# Patient Record
Sex: MALE | Race: BLACK OR AFRICAN AMERICAN | Employment: PART TIME | URBAN - METROPOLITAN AREA
[De-identification: names, ages, dates, MRNs, and addresses within clinical notes are randomized per-mention and may not be internally consistent; named-entity substitution may affect disease eponyms.]

---

## 2019-10-28 ENCOUNTER — ED HISTORICAL/CONVERTED ENCOUNTER (OUTPATIENT)
Dept: OTHER | Age: 34
End: 2019-10-28

## 2020-09-20 ENCOUNTER — HOSPITAL ENCOUNTER (EMERGENCY)
Age: 35
Discharge: HOME | End: 2020-09-20
Attending: EMERGENCY MEDICINE
Payer: COMMERCIAL

## 2020-09-20 ENCOUNTER — APPOINTMENT (OUTPATIENT)
Dept: CT IMAGING | Age: 35
End: 2020-09-20
Attending: EMERGENCY MEDICINE
Payer: COMMERCIAL

## 2020-09-20 VITALS
RESPIRATION RATE: 18 BRPM | DIASTOLIC BLOOD PRESSURE: 109 MMHG | TEMPERATURE: 97.8 F | SYSTOLIC BLOOD PRESSURE: 153 MMHG | HEIGHT: 71 IN | OXYGEN SATURATION: 95 % | HEART RATE: 73 BPM | BODY MASS INDEX: 33.6 KG/M2 | WEIGHT: 240 LBS

## 2020-09-20 DIAGNOSIS — N20.0 KIDNEY STONE: Primary | ICD-10-CM

## 2020-09-20 LAB
AMYLASE SERPL-CCNC: 75 U/L (ref 25–115)
ANION GAP SERPL CALC-SCNC: 7 MMOL/L (ref 5–15)
APPEARANCE UR: CLEAR
BACTERIA URNS QL MICRO: NEGATIVE /HPF
BASOPHILS # BLD: 0 K/UL (ref 0–0.2)
BASOPHILS NFR BLD: 0 % (ref 0–2.5)
BILIRUB UR QL: NEGATIVE
BUN SERPL-MCNC: 14 MG/DL (ref 6–20)
BUN/CREAT SERPL: 11 (ref 12–20)
CA-I BLD-MCNC: 9.5 MG/DL (ref 8.5–10.1)
CHLORIDE SERPL-SCNC: 104 MMOL/L (ref 97–108)
CO2 SERPL-SCNC: 29 MMOL/L (ref 21–32)
COLOR UR: ABNORMAL
CREAT SERPL-MCNC: 1.33 MG/DL (ref 0.7–1.3)
EOSINOPHIL # BLD: 0 K/UL (ref 0–0.7)
EOSINOPHIL NFR BLD: 0 % (ref 0.9–2.9)
ERYTHROCYTE [DISTWIDTH] IN BLOOD BY AUTOMATED COUNT: 14.6 % (ref 11.5–14.5)
GLUCOSE SERPL-MCNC: 116 MG/DL (ref 65–100)
GLUCOSE UR STRIP.AUTO-MCNC: NEGATIVE MG/DL
HCT VFR BLD AUTO: 41 % (ref 41–53)
HGB BLD-MCNC: 14.1 % (ref 13.5–17.5)
HGB UR QL STRIP: ABNORMAL
KETONES UR QL STRIP.AUTO: NEGATIVE MG/DL
LEUKOCYTE ESTERASE UR QL STRIP.AUTO: NEGATIVE
LYMPHOCYTES # BLD: 3.4 K/UL (ref 1–4.8)
LYMPHOCYTES NFR BLD: 38 % (ref 20.5–51.1)
MCH RBC QN AUTO: 31.3 PG (ref 31–34)
MCHC RBC AUTO-ENTMCNC: 34.3 G/DL (ref 31–36)
MCV RBC AUTO: 91.2 FL (ref 80–100)
MONOCYTES # BLD: 0.6 K/UL (ref 0.2–2.4)
MONOCYTES NFR BLD: 6 % (ref 1.7–9.3)
NEUTS SEG # BLD: 4.9 K/UL (ref 1.8–7.7)
NEUTS SEG NFR BLD: 56 % (ref 42–75)
NITRITE UR QL STRIP.AUTO: NEGATIVE
NRBC # BLD: 0.01 K/UL
NRBC BLD-RTO: 10 PER 100 WBC
PH UR STRIP: 6.5 [PH] (ref 5–8)
PLATELET # BLD AUTO: 439 K/UL
PMV BLD AUTO: 7.2 FL (ref 6.5–11.5)
POTASSIUM SERPL-SCNC: 4.1 MMOL/L (ref 3.5–5.1)
PROT UR STRIP-MCNC: 100 MG/DL
RBC # BLD AUTO: 4.5 M/UL (ref 4.5–5.9)
RBC #/AREA URNS HPF: NORMAL /HPF (ref 0–3)
SODIUM SERPL-SCNC: 140 MMOL/L (ref 136–145)
SP GR UR REFRACTOMETRY: >1.03 (ref 1–1.03)
UROBILINOGEN UR QL STRIP.AUTO: 1 EU/DL (ref 0.2–1)
WBC # BLD AUTO: 8.9 K/UL (ref 4.4–11.3)
WBC URNS QL MICRO: NORMAL /HPF (ref 0–5)

## 2020-09-20 PROCEDURE — 36415 COLL VENOUS BLD VENIPUNCTURE: CPT

## 2020-09-20 PROCEDURE — 74176 CT ABD & PELVIS W/O CONTRAST: CPT

## 2020-09-20 PROCEDURE — 82150 ASSAY OF AMYLASE: CPT

## 2020-09-20 PROCEDURE — 96374 THER/PROPH/DIAG INJ IV PUSH: CPT

## 2020-09-20 PROCEDURE — 85025 COMPLETE CBC W/AUTO DIFF WBC: CPT

## 2020-09-20 PROCEDURE — 99283 EMERGENCY DEPT VISIT LOW MDM: CPT

## 2020-09-20 PROCEDURE — 80048 BASIC METABOLIC PNL TOTAL CA: CPT

## 2020-09-20 PROCEDURE — 74011250636 HC RX REV CODE- 250/636: Performed by: EMERGENCY MEDICINE

## 2020-09-20 PROCEDURE — 74011250637 HC RX REV CODE- 250/637: Performed by: EMERGENCY MEDICINE

## 2020-09-20 PROCEDURE — 81001 URINALYSIS AUTO W/SCOPE: CPT

## 2020-09-20 PROCEDURE — 96375 TX/PRO/DX INJ NEW DRUG ADDON: CPT

## 2020-09-20 RX ORDER — OXYCODONE AND ACETAMINOPHEN 5; 325 MG/1; MG/1
1 TABLET ORAL
Qty: 20 TAB | Refills: 0 | Status: SHIPPED | OUTPATIENT
Start: 2020-09-20 | End: 2020-09-25

## 2020-09-20 RX ORDER — FENTANYL CITRATE 50 UG/ML
50 INJECTION, SOLUTION INTRAMUSCULAR; INTRAVENOUS
Status: COMPLETED | OUTPATIENT
Start: 2020-09-20 | End: 2020-09-20

## 2020-09-20 RX ORDER — ONDANSETRON 2 MG/ML
4 INJECTION INTRAMUSCULAR; INTRAVENOUS
Status: COMPLETED | OUTPATIENT
Start: 2020-09-20 | End: 2020-09-20

## 2020-09-20 RX ORDER — OXYCODONE AND ACETAMINOPHEN 5; 325 MG/1; MG/1
1 TABLET ORAL
Status: DISCONTINUED | OUTPATIENT
Start: 2020-09-20 | End: 2020-09-20

## 2020-09-20 RX ORDER — KETOROLAC TROMETHAMINE 30 MG/ML
30 INJECTION, SOLUTION INTRAMUSCULAR; INTRAVENOUS
Status: COMPLETED | OUTPATIENT
Start: 2020-09-20 | End: 2020-09-20

## 2020-09-20 RX ORDER — OXYCODONE AND ACETAMINOPHEN 5; 325 MG/1; MG/1
1 TABLET ORAL
Status: COMPLETED | OUTPATIENT
Start: 2020-09-20 | End: 2020-09-20

## 2020-09-20 RX ADMIN — KETOROLAC TROMETHAMINE 30 MG: 30 INJECTION, SOLUTION INTRAMUSCULAR at 07:31

## 2020-09-20 RX ADMIN — OXYCODONE HYDROCHLORIDE AND ACETAMINOPHEN 1 TABLET: 5; 325 TABLET ORAL at 07:49

## 2020-09-20 RX ADMIN — ONDANSETRON 4 MG: 2 INJECTION INTRAMUSCULAR; INTRAVENOUS at 06:03

## 2020-09-20 RX ADMIN — FENTANYL CITRATE 50 MCG: 50 INJECTION, SOLUTION INTRAMUSCULAR; INTRAVENOUS at 06:03

## 2020-09-20 NOTE — ED TRIAGE NOTES
Pt c/o a sharp pain in the LRQ of his abdomen starting a few hours ago. Pt denies any n/v/d or painful urination. Pt able to ambulate without assistance to the room.

## 2020-09-20 NOTE — Clinical Note
[address] 
[facility] EMERGENCY DEPT 
[address] 
[phone] Work/School Note Date: 9/20/2020 To Whom It May concern: 
 
Jessika Childs was seen and treated today in the emergency room by the following provider(s): 
Attending Provider: Christa Tompkins MD. Jessika Childs is excused from work/school on 09/20/20 and 09/21/20. He is medically clear to return to work/school on 9/22/2020.   
 
 
Sincerely, 
 
 
 
 
Anni Calhoun MD

## 2020-09-20 NOTE — ED PROVIDER NOTES
Patient C/O RLQ abdominal pain, starting 2 hours ago. No fever, nausea or vomiting, worse with pressure           History reviewed. No pertinent past medical history. Past Surgical History:   Procedure Laterality Date    HX SHOULDER ARTHROSCOPY           History reviewed. No pertinent family history. Social History     Socioeconomic History    Marital status: SINGLE     Spouse name: Not on file    Number of children: Not on file    Years of education: Not on file    Highest education level: Not on file   Occupational History    Not on file   Social Needs    Financial resource strain: Not on file    Food insecurity     Worry: Not on file     Inability: Not on file    Transportation needs     Medical: Not on file     Non-medical: Not on file   Tobacco Use    Smoking status: Never Smoker    Smokeless tobacco: Never Used   Substance and Sexual Activity    Alcohol use: Not on file    Drug use: Not on file    Sexual activity: Not on file   Lifestyle    Physical activity     Days per week: Not on file     Minutes per session: Not on file    Stress: Not on file   Relationships    Social connections     Talks on phone: Not on file     Gets together: Not on file     Attends Confucianism service: Not on file     Active member of club or organization: Not on file     Attends meetings of clubs or organizations: Not on file     Relationship status: Not on file    Intimate partner violence     Fear of current or ex partner: Not on file     Emotionally abused: Not on file     Physically abused: Not on file     Forced sexual activity: Not on file   Other Topics Concern    Not on file   Social History Narrative    Not on file         ALLERGIES: Patient has no known allergies. Review of Systems   Constitutional: Negative. HENT: Negative. Eyes: Negative. Respiratory: Negative. Cardiovascular: Negative. Gastrointestinal: Positive for abdominal pain.  Negative for blood in stool, constipation, diarrhea, nausea and vomiting. Endocrine: Negative. Genitourinary: Negative. Negative for dysuria. Musculoskeletal: Negative. Skin: Negative. Allergic/Immunologic: Negative. Neurological: Negative. Hematological: Negative. Psychiatric/Behavioral: Negative. Vitals:    09/20/20 0534   BP: (!) 143/85   Pulse: 84   Resp: 18   Temp: 97.8 °F (36.6 °C)   SpO2: 96%   Weight: 108.9 kg (240 lb)   Height: 5' 11\" (1.803 m)            Physical Exam  Vitals signs and nursing note reviewed. HENT:      Head: Normocephalic and atraumatic. Neck:      Musculoskeletal: Normal range of motion and neck supple. Cardiovascular:      Rate and Rhythm: Normal rate and regular rhythm. Pulses: Normal pulses. Heart sounds: Normal heart sounds. Pulmonary:      Effort: Pulmonary effort is normal.      Breath sounds: Normal breath sounds. Abdominal:      General: Bowel sounds are normal.      Tenderness: There is abdominal tenderness in the right lower quadrant. There is no guarding or rebound. Musculoskeletal: Normal range of motion. Skin:     General: Skin is warm and dry. Neurological:      General: No focal deficit present. Mental Status: He is oriented to person, place, and time. Mental status is at baseline. Psychiatric:         Mood and Affect: Mood normal.         Behavior: Behavior normal.          MDM  Number of Diagnoses or Management Options  Kidney stone: established and improving     Amount and/or Complexity of Data Reviewed  Clinical lab tests: reviewed  Tests in the radiology section of CPT®: reviewed    Risk of Complications, Morbidity, and/or Mortality  Presenting problems: moderate  Diagnostic procedures: moderate    Patient Progress  Patient progress: improved    ED Course as of Sep 20 0748   Sun Sep 20, 2020   0726 Abd Ct results reviewed.      []   0727 CT ABD PELV WO CONT []      ED Course User Index  [] Enio Stevens MD       Procedures

## 2020-09-20 NOTE — LETTER
Name  
Johan Stanley  YOB: 1985 Effective Dates 9/20/2020  to *** GREEN means Go! Use CONTROL medicine daily YELLOW means Caution! Add RESCUE medicine RED means DANGER! Get help from a doctor now! Health Care Provider No name on file. Providers Phone [phone] 
y  
y  
y Parent/Guardian  
*** Parent/Guardian Phone   Parent/Guardian Email:   
Additional Emergency Contact  Contact Phone   Contact Email:   
Asthma Severity ? Intermittent or Persistent: ? Mild ? Moderate ? Severe  Asthma Triggers (Things that make your asthma worse) ? Colds ? Smoke (tobacco, incense) ? Pollen ? Dust ? Animals:_________ ? Strong odors ? Mold/moisture ? Pests (rodents, cockroaches) ? Stress/Emotions ? Exercise ? Gastroesophageal reflux ? Season ([place]): Fall, Winter, Spring, Summer ? Other:_____________________________________ Last Flu Shot: / / *** Pneumonia Shot: / / [place] Zone: Go!   Take these CONTROL (PREVENTION) Medicines EVERY Day You have ALL of these:  Breathing is easy  No cough or wheeze  Can work and play  Can sleep all night Peak flow in this area: ______ to ______ (More than 80% of Personal Best) Personal best peak flow:________  . No control medicines required. .___***________________________________, ____ puff (s) MDI with Spacer __ times a day Inhaled Corticosteroid or Inhaled corticosteroid/long-acting â-agonist .____________________***_________________, ____ nebulizer treatment (s) _***_ times a day Inhaled Corticosteroid . ______****_____________________, take _***___ by mouth once daily at bedtime Leukotriene antagonist For asthma with exercise, ADD: .____****____, ___***__ puffs with spacer 15 minutes before exercise Fast acting Inhaled â-agonist For nasal/environmental allergy, ADD: .____________________________, use _____ spray (s) per nostril _____ times a day Nasal corticosteroid Always rinse mouth after using your daily inhaled medicine. You have ANY of these: Yellow Zone: Caution!   Continue CONTROL Medicines and ADD RESCUE Medicines  Cough or mild wheeze  ._________________, ____ puffs with spacer every ____ hours as needed Inhaled b-agonist   
 First sign of cold  . _________________, ____ nebulizer treatment (s) every ____ hours as needed  Tight chest  Inhaled b-agonist   
 Problems sleeping,  . Other ________________________________________________________   
working, or playing Peak flow in this area: Call your Healthcare Provider if you need rescue medicine for more than 24   
______ to _____ (60%-80% of Personal Best)  hours or two times a week, or if your rescue medicine doesnt work You have ANY of these:  Cant talk, eat, or walk well  Medicine is not helping  Breathing hard and fast  Blue lips and fingernails  Tired or lethargic  Ribs show Peak flow in this area: _____ to _____  . ________________, __ puffs with spacer every 15 minutes, for THREE treatments Inhaled â-agonist . ________________, __ nebulizer treatment every 15 minutes, for THREE treatments Inhaled â-agonist Call your doctor while administering the treatments. .Other IF YOU CANNOT CONTACT YOUR DOCTOR: Call 911 for an ambulance, or go directly to the Emergency Department! SCHOOL MEDICATION CONSENT & HEALTH CARE PROVIDER ORDER CHECK ALL THAT APPLY: ____ Student instructed in proper use of their asthma medications, and in my opinion, CAN CARRY AND SELF-ADMINISTER INHALER AT SCHOOL. ____ Student is to notify designated school health officials after using inhaler at school. ____ Student needs supervision or assistance to use inhaler. ____ Student should NOT carry inhaler while at school. MD/NP/PA SIGNATURE: ____________________________ DATE_______ REQUIRED SIGNATURES: I give permission for school personnel to follow this plan, administer medication and care for my child and contact my provider if necessary.  I assume full responsibility for providing the school with prescribed medication and delivery/ monitoring devices. I approve this Asthma Management Plan for my child. PARENT/GUARDIAN ________________________ Date _______ [facility] NURSE/DESIGNEE ___________________ Date _______ OTHER _________________________________ Date _______ [place] Asthma Action Plan approved by the Naveed Hernandez [facility] 4/11 Based on NAEPP Guidelines and modified with permission from the [facility]. Asthma Action Plan via [employer], and [employer]

## 2021-01-04 ENCOUNTER — HOSPITAL ENCOUNTER (EMERGENCY)
Age: 36
Discharge: HOME | End: 2021-01-04
Attending: EMERGENCY MEDICINE
Payer: COMMERCIAL

## 2021-01-04 ENCOUNTER — APPOINTMENT (OUTPATIENT)
Dept: CT IMAGING | Age: 36
End: 2021-01-04
Attending: EMERGENCY MEDICINE
Payer: COMMERCIAL

## 2021-01-04 VITALS
DIASTOLIC BLOOD PRESSURE: 105 MMHG | RESPIRATION RATE: 16 BRPM | OXYGEN SATURATION: 99 % | SYSTOLIC BLOOD PRESSURE: 151 MMHG | HEART RATE: 70 BPM | HEIGHT: 71 IN | TEMPERATURE: 98.5 F | BODY MASS INDEX: 34.98 KG/M2 | WEIGHT: 249.9 LBS

## 2021-01-04 VITALS
TEMPERATURE: 98.7 F | SYSTOLIC BLOOD PRESSURE: 146 MMHG | WEIGHT: 249 LBS | HEIGHT: 71 IN | HEART RATE: 80 BPM | DIASTOLIC BLOOD PRESSURE: 99 MMHG | BODY MASS INDEX: 34.86 KG/M2 | OXYGEN SATURATION: 99 % | RESPIRATION RATE: 16 BRPM

## 2021-01-04 DIAGNOSIS — N13.2 URETERAL STONE WITH HYDRONEPHROSIS: Primary | ICD-10-CM

## 2021-01-04 DIAGNOSIS — S39.012A STRAIN OF LUMBAR PARASPINAL MUSCLE, INITIAL ENCOUNTER: Primary | ICD-10-CM

## 2021-01-04 LAB
APPEARANCE UR: ABNORMAL
BACTERIA URNS QL MICRO: NEGATIVE /HPF
BILIRUB UR QL: NEGATIVE
COLOR UR: ABNORMAL
GLUCOSE UR STRIP.AUTO-MCNC: NEGATIVE MG/DL
HGB UR QL STRIP: ABNORMAL
KETONES UR QL STRIP.AUTO: NEGATIVE MG/DL
LEUKOCYTE ESTERASE UR QL STRIP.AUTO: NEGATIVE
MUCOUS THREADS URNS QL MICRO: ABNORMAL /LPF
NITRITE UR QL STRIP.AUTO: NEGATIVE
PH UR STRIP: 6 [PH] (ref 5–8)
PROT UR STRIP-MCNC: 100 MG/DL
RBC #/AREA URNS HPF: >100 /HPF (ref 0–5)
SP GR UR REFRACTOMETRY: 1.02 (ref 1–1.03)
UROBILINOGEN UR QL STRIP.AUTO: 0.1 EU/DL (ref 0.1–1)
WBC URNS QL MICRO: ABNORMAL /HPF (ref 0–4)

## 2021-01-04 PROCEDURE — 81001 URINALYSIS AUTO W/SCOPE: CPT

## 2021-01-04 PROCEDURE — 99282 EMERGENCY DEPT VISIT SF MDM: CPT

## 2021-01-04 PROCEDURE — 99283 EMERGENCY DEPT VISIT LOW MDM: CPT

## 2021-01-04 PROCEDURE — 74011250637 HC RX REV CODE- 250/637: Performed by: EMERGENCY MEDICINE

## 2021-01-04 PROCEDURE — 74176 CT ABD & PELVIS W/O CONTRAST: CPT

## 2021-01-04 RX ORDER — OXYCODONE HYDROCHLORIDE 5 MG/1
5 TABLET ORAL
Qty: 10 TAB | Refills: 0 | Status: SHIPPED | OUTPATIENT
Start: 2021-01-04 | End: 2021-01-11

## 2021-01-04 RX ORDER — IBUPROFEN 800 MG/1
800 TABLET ORAL
Status: COMPLETED | OUTPATIENT
Start: 2021-01-04 | End: 2021-01-04

## 2021-01-04 RX ORDER — CYCLOBENZAPRINE HCL 10 MG
10 TABLET ORAL
Status: COMPLETED | OUTPATIENT
Start: 2021-01-04 | End: 2021-01-04

## 2021-01-04 RX ORDER — IBUPROFEN 800 MG/1
800 TABLET ORAL
Qty: 20 TAB | Refills: 0 | Status: SHIPPED | OUTPATIENT
Start: 2021-01-04 | End: 2021-01-11

## 2021-01-04 RX ORDER — IBUPROFEN 600 MG/1
600 TABLET ORAL
Qty: 20 TAB | Refills: 0 | Status: SHIPPED | OUTPATIENT
Start: 2021-01-04 | End: 2021-01-08 | Stop reason: ALTCHOICE

## 2021-01-04 RX ORDER — OXYCODONE HYDROCHLORIDE 5 MG/1
5 TABLET ORAL
Status: DISCONTINUED | OUTPATIENT
Start: 2021-01-04 | End: 2021-01-04 | Stop reason: HOSPADM

## 2021-01-04 RX ORDER — CYCLOBENZAPRINE HCL 10 MG
10 TABLET ORAL
Status: DISCONTINUED | OUTPATIENT
Start: 2021-01-04 | End: 2021-01-04 | Stop reason: HOSPADM

## 2021-01-04 RX ORDER — CYCLOBENZAPRINE HCL 10 MG
10 TABLET ORAL 2 TIMES DAILY
Qty: 20 TAB | Refills: 0 | Status: SHIPPED | OUTPATIENT
Start: 2021-01-04 | End: 2021-02-11

## 2021-01-04 RX ORDER — TAMSULOSIN HYDROCHLORIDE 0.4 MG/1
0.4 CAPSULE ORAL DAILY
Qty: 7 CAP | Refills: 0 | Status: SHIPPED | OUTPATIENT
Start: 2021-01-04 | End: 2021-01-11

## 2021-01-04 RX ORDER — KETOROLAC TROMETHAMINE 30 MG/ML
30 INJECTION, SOLUTION INTRAMUSCULAR; INTRAVENOUS
Status: DISCONTINUED | OUTPATIENT
Start: 2021-01-04 | End: 2021-01-04 | Stop reason: HOSPADM

## 2021-01-04 RX ADMIN — CYCLOBENZAPRINE 10 MG: 10 TABLET, FILM COATED ORAL at 09:30

## 2021-01-04 RX ADMIN — IBUPROFEN 800 MG: 800 TABLET, FILM COATED ORAL at 09:30

## 2021-01-04 NOTE — ED NOTES
[facility] al medications, side effects and follow up appointments explained with pt's verbal understanding given. All personal belongings taken.  Pt refused any medications ordered stated he will take his medications once filled at home

## 2021-01-04 NOTE — ED PROVIDER NOTES
EMERGENCY DEPARTMENT HISTORY AND PHYSICAL EXAM      Date: 1/4/2021  Patient Name: Isaura Liang    History of Presenting Illness     Chief Complaint   Patient presents with    Flank Pain       History Provided By: Patient    HPI: Isaura Liang, 37 y.o. male with PMHx as noted below presents the emergency department chief complaint of flank pain. Patient states that he had onset of constant left-sided flank pain yesterday. Patient notes the pain will wax and wane in intensity and is without specific relieving or exacerbating factors. The pain does radiate to the his left groin. He reports no associated nausea, vomiting, dysuria, testicular pain, diarrhea, hematuria. PCP: MATHIEU Davis    Current Facility-Administered Medications   Medication Dose Route Frequency Provider Last Rate Last Admin    ketorolac (TORADOL) injection 30 mg  30 mg IntraMUSCular NOW Tico Padron MD        oxyCODONE IR (ROXICODONE) tablet 5 mg  5 mg Oral NOW Tico Padron MD         Current Outpatient Medications   Medication Sig Dispense Refill    oxyCODONE IR (Roxicodone) 5 mg immediate release tablet Take 1 Tab by mouth every six (6) hours as needed for Pain for up to 7 days. Max Daily Amount: 20 mg. 10 Tab 0    tamsulosin (Flomax) 0.4 mg capsule Take 1 Cap by mouth daily for 7 doses. 7 Cap 0    ibuprofen (MOTRIN) 600 mg tablet Take 1 Tab by mouth every six (6) hours as needed for Pain. 20 Tab 0    ibuprofen (MOTRIN) 800 mg tablet Take 1 Tab by mouth every eight (8) hours as needed for Pain for up to 7 days. 20 Tab 0    cyclobenzaprine (FLEXERIL) 10 mg tablet Take 1 Tab by mouth two (2) times a day. 20 Tab 0       Past History     Past Medical History:  History reviewed. No pertinent past medical history. Past Surgical History:  Past Surgical History:   Procedure Laterality Date    HX SHOULDER ARTHROSCOPY         Family History:  History reviewed. No pertinent family history.     Social History:  Social History     Tobacco Use    Smoking status: Never Smoker    Smokeless tobacco: Never Used   Substance Use Topics    Alcohol use: Yes     Comment: occassionally    Drug use: Not Currently       Allergies:  No Known Allergies      Review of Systems   Review of Systems  Constitutional: Negative for fever, chills, and fatigue. HENT: Negative for congestion, sore throat, rhinorrhea, sneezing and neck stiffness   Eyes: Negative for discharge and redness. Respiratory: Negative for  shortness of breath, wheezing   Cardiovascular: Negative for chest pain, palpitations   Gastrointestinal: Negative for nausea, vomiting, abdominal pain, constipation, diarrhea and blood in stool. Genitourinary: Negative for dysuria, hematuria,  decreased urine volume, discharge. Positive flank pain  Musculoskeletal: Negative for myalgias or joint pain . Skin: Negative for rash or lesions . Neurological: Negative weakness, light-headedness, numbness and headaches. Physical Exam   Physical Exam    GENERAL: alert and oriented, no acute distress  EYES: PEERL, No injection, discharge or icterus. ENT: Mucous membranes pink and moist.  NECK: Supple  LUNGS: Airway patent. Non-labored respirations. Breath sounds clear with good air entry bilaterally. HEART: Regular rate and rhythm. No peripheral edema  ABDOMEN: Non-distended and non-tender, without guarding or rebound.   SKIN:  warm, dry  MSK/EXTREMITIES: Without swelling, tenderness or deformity, symmetric with normal ROM  NEUROLOGICAL: Alert, oriented      Diagnostic Study Results     Labs -     Recent Results (from the past 12 hour(s))   URINALYSIS W/MICROSCOPIC    Collection Time: 01/04/21  1:30 PM   Result Value Ref Range    Color Yellow/Straw      Appearance Turbid (A) Clear      Specific gravity 1.024 1.003 - 1.030      pH (UA) 6.0 5.0 - 8.0      Protein 100 (A) Negative mg/dL    Glucose Negative Negative mg/dL    Ketone Negative Negative mg/dL    Bilirubin Negative Negative Blood Large (A) Negative      Urobilinogen 0.1 0.1 - 1.0 EU/dL    Nitrites Negative Negative      Leukocyte Esterase Negative Negative      WBC 0-4 0 - 4 /hpf    RBC >100 (H) 0 - 5 /hpf    Bacteria Negative Negative /hpf    Mucus Trace /lpf       Radiologic Studies -   CT ABD PELV WO CONT   Final Result   IMPRESSION: Moderate grade hydronephrosis left kidney. 5-6 mm distal left ureteral stone. CT Results  (Last 48 hours)               01/04/21 1509  CT ABD PELV WO CONT Final result    Impression:  IMPRESSION: Moderate grade hydronephrosis left kidney. 5-6 mm distal left ureteral stone. Narrative:  CT abdomen and pelvis without IV contrast       Axial images are reviewed along with reformatted sagittal/coronal images. No IV   contrast administered. Dose reduction: All CT scans at this facility are performed using dose reduction   optimization techniques as appropriate to a performed exam including the   following-   automated exposure control, adjustments of mA and/or Kv according to patient   size, or use of iterative reconstructive technique. The lung bases are clear. Nonenhanced images demonstrate unremarkable appearance for the liver. Gallbladder nondistended. Pancreas, spleen, and bilateral adrenal glands appear   unremarkable. Right kidney with some pyramidal calcification. No hydronephrosis. Left kidney with moderate hydronephrosis. Few 5 mm or less nonobstructing stones   noted left kidney. Following course of distended left ureter there is a 5-6 mm   distal left ureteral stone. Bladder decompressed. No calcified bladder stone. No bowel obstruction. No CT evidence for appendicitis or diverticulitis. No   ascites. CXR Results  (Last 48 hours)    None            Medical Decision Making     IFeli MD am the first provider for this patient and am the attending of record for this patient encounter.     I reviewed the vital signs, available nursing notes, past medical history, past surgical history, family history and social history. Vital Signs-Reviewed the patient's vital signs. Patient Vitals for the past 12 hrs:   Temp Pulse Resp BP SpO2   01/04/21 1318 98.7 °F (37.1 °C) 80 16 (!) 146/99 99 %         Records Reviewed: Nursing Notes and Old Medical Records    Provider Notes (Medical Decision Making): On presentation, the patient is well appearing, in no acute distress with normal vital signs. Based on my history and exam the differential diagnosis for this patient includes obstructed ureteral stone, hydronephrosis, pyelonephritis, testicular torsion, diverticulitis among others. Urinalysis notable for hematuria. CT obtained and showed a 5 to 6 mm distal left ureteral stone. Offered patient pain medication the emergency department however he declined as he stated his pain had improved. Have instructed him to call for urology follow-up today. ED Course:   Initial assessment performed. The patients presenting problems have been discussed, and they are in agreement with the care plan formulated and outlined with them. I have encouraged them to ask questions as they arise throughout their visit. LUKE  Kamari Vang's  results have been reviewed with him. He has been counseled regarding his diagnosis. He verbally conveys understanding and agreement of the signs, symptoms, diagnosis, treatment and prognosis and additionally agrees to follow up as recommended with MATHIEU Moon in 24 - 48 hours. He also agrees with the care-plan and conveys that all of his questions have been answered.   I have also put together some discharge instructions for him that include: 1) educational information regarding their diagnosis, 2) how to care for their diagnosis at home, as well a 3) list of reasons why they would want to return to the ED prior to their follow-up appointment, should their condition change. Disposition:  home    PLAN:  1. Discharge Medication List as of 1/4/2021  4:44 PM      START taking these medications    Details   oxyCODONE IR (Roxicodone) 5 mg immediate release tablet Take 1 Tab by mouth every six (6) hours as needed for Pain for up to 7 days. Max Daily Amount: 20 mg., Normal, Disp-10 Tab, R-0      tamsulosin (Flomax) 0.4 mg capsule Take 1 Cap by mouth daily for 7 doses. , Normal, Disp-7 Cap, R-0      !! ibuprofen (MOTRIN) 600 mg tablet Take 1 Tab by mouth every six (6) hours as needed for Pain., Normal, Disp-20 Tab, R-0       !! - Potential duplicate medications found. Please discuss with provider. CONTINUE these medications which have NOT CHANGED    Details   !! ibuprofen (MOTRIN) 800 mg tablet Take 1 Tab by mouth every eight (8) hours as needed for Pain for up to 7 days. , Normal, Disp-20 Tab, R-0      cyclobenzaprine (FLEXERIL) 10 mg tablet Take 1 Tab by mouth two (2) times a day., Normal, Disp-20 Tab, R-0       !! - Potential duplicate medications found. Please discuss with provider. 2.   Follow-up Information     Follow up With Specialties Details Why Contact Info    Zen Gutierrez [address] Cardiology Schedule an appointment as soon as possible for a visit in 3 days  Maxime De La Cruz MD Urology Schedule an appointment as soon as possible for a visit in 1 day  [address] [facility]  [phone]      call [phone] today            Return to ED if worse     Diagnosis     Clinical Impression:   1. Ureteral stone with hydronephrosis        Please note that this dictation was completed with Dragon, computer voice recognition software. Quite often unanticipated grammatical, syntax, homophones, and other interpretive errors are inadvertently transcribed by the computer software. Please disregard these errors.   Additionally, please excuse any errors that have escaped final proofreading.

## 2021-01-04 NOTE — LETTER
[address] 
[facility] EMERGENCY DEPT 
[address] 
[phone] Work/School Note Date: 1/4/2021 To Whom It May concern: 
 
Marcela Jama was seen and treated today in the emergency room by the following provider(s): 
No providers found. Marcela Jama is excused from work/school on 01/04/21 and 01/05/21. He is medically clear to return to work/school on 1/6/2021. Sincerely, [facility]

## 2021-01-04 NOTE — ED PROVIDER NOTES
EMERGENCY DEPARTMENT HISTORY AND PHYSICAL EXAM      Date: 1/4/2021  Patient Name: Kathleen Polk    History of Presenting Illness     Chief Complaint   Patient presents with    Flank Pain     pt c/o back pain and L flank onset yesterday; pt reports extra lifting at work recently; pt reports hx kidney stones; pt denies urinary issues; AOx4; pt reports pain 10/10    Back Pain       History Provided By: Patient    HPI: Kathleen Polk, 37 y.o. male with a past medical history significant Kidney stone presents to the ED with cc of low back pain. Patient feels improved lifting boxes at his job-[employer]. Pain is bilateral lower lumbar worse with bending turning. Nonradiating no weakness or numbness lower extremity no previous injury. There are no other complaints, changes, or physical findings at this time. PCP: None    No current facility-administered medications on file prior to encounter. No current outpatient medications on file prior to encounter. Past History     Past Medical History:  History reviewed. No pertinent past medical history. Past Surgical History:  Past Surgical History:   Procedure Laterality Date    HX SHOULDER ARTHROSCOPY         Family History:  History reviewed. No pertinent family history. Social History:  Social History     Tobacco Use    Smoking status: Never Smoker    Smokeless tobacco: Never Used   Substance Use Topics    Alcohol use: Yes     Comment: occassionally    Drug use: Not Currently       Allergies:  No Known Allergies      Review of Systems   Review of all other systems negative  Review of Systems    Physical Exam   Healthy young male no acute distress  Physical Exam  Vitals signs and nursing note reviewed. Constitutional:       General: He is not in acute distress. Appearance: Normal appearance. He is not ill-appearing or toxic-appearing. HENT:      Head: Normocephalic and atraumatic.    Neck:      Musculoskeletal: Normal range of motion and neck supple. No neck rigidity or muscular tenderness. Vascular: No carotid bruit. Cardiovascular:      Pulses: Normal pulses. Pulmonary:      Effort: No respiratory distress. Breath sounds: No wheezing, rhonchi or rales. Abdominal:      General: Abdomen is flat. There is no distension. Palpations: Abdomen is soft. There is no mass. Tenderness: There is no abdominal tenderness. There is no right CVA tenderness, left CVA tenderness, guarding or rebound. Hernia: No hernia is present. Musculoskeletal: Normal range of motion. General: Tenderness present. Comments: Tender in the paravertebral muscles bilaterally approximately L4-L5 region; no CVA tenderness   Skin:     General: Skin is warm and dry. Neurological:      General: No focal deficit present. Mental Status: He is alert and oriented to person, place, and time. Mental status is at baseline. Psychiatric:         Mood and Affect: Mood normal.         Behavior: Behavior normal.         Thought Content: Thought content normal.         Lab and Diagnostic Study Results     Labs -   No results found for this or any previous visit (from the past 12 hour(s)). Radiologic Studies -   @lastxrresult@  CT Results  (Last 48 hours)    None        CXR Results  (Last 48 hours)    None            Medical Decision Making   - I am the first provider for this patient. - I reviewed the vital signs, available nursing notes, past medical history, past surgical history, family history and social history. - Initial assessment performed. The patients presenting problems have been discussed, and they are in agreement with the care plan formulated and outlined with them. I have encouraged them to ask questions as they arise throughout their visit. Vital Signs-Reviewed the patient's vital signs.   Patient Vitals for the past 12 hrs:   Temp Pulse Resp BP SpO2   01/04/21 0915 98.5 °F (36.9 °C) 70 16 (!) 151/105 99 %       Records Reviewed: Nursing Notes and Old Medical Records    The patient presents with back pain with a differential diagnosis of  AAA, kidney stone, lumbar strain, pyelonephritis, shingles and traumatic injury      ED Course:          Provider Notes (Medical Decision Making):   26-year-old male cervical myofascial back pain after repetitive lifting will treat with ibuprofen Flexeril follow-up with family doctor in 5 to 7 days if not improved  MDM       Procedures   Medical Decision Makingedical Decision Making  Performed by: Wilfredo Gomes MD  PROCEDURES:  Procedures       Disposition   Disposition: Condition unchanged and stable  DC- Adult Discharges: All of the diagnostic tests were reviewed and questions answered. Diagnosis, care plan and treatment options were discussed. The patient understands the instructions and will follow up as directed. The patients results have been reviewed with them. They have been counseled regarding their diagnosis. The patient verbally convey understanding and agreement of the signs, symptoms, diagnosis, treatment and prognosis and additionally agrees to follow up as recommended with their PCP in 24 - 48 hours. They also agree with the care-plan and convey that all of their questions have been answered. I have also put together some discharge instructions for them that include: 1) educational information regarding their diagnosis, 2) how to care for their diagnosis at home, as well a 3) list of reasons why they would want to return to the ED prior to their follow-up appointment, should their condition change. DISCHARGE PLAN:  1. There are no discharge medications for this patient. 2.   Follow-up Information    None       3. Return to ED if worse   4. There are no discharge medications for this patient.         Diagnosis     Clinical Impression: Lumbar back strain attestations:    Wilfredo Gomes MD    Please note that this dictation was completed with Cognitive Code, the computer voice recognition software. Quite often unanticipated grammatical, syntax, homophones, and other interpretive errors are inadvertently transcribed by the computer software. Please disregard these errors. Please excuse any errors that have escaped final proofreading. Thank you.

## 2021-01-08 ENCOUNTER — OFFICE VISIT (OUTPATIENT)
Dept: UROLOGY | Age: 36
End: 2021-01-08
Payer: COMMERCIAL

## 2021-01-08 ENCOUNTER — TELEPHONE (OUTPATIENT)
Dept: UROLOGY | Age: 36
End: 2021-01-08

## 2021-01-08 VITALS
HEIGHT: 71 IN | TEMPERATURE: 99 F | BODY MASS INDEX: 34.86 KG/M2 | WEIGHT: 249 LBS | HEART RATE: 82 BPM | SYSTOLIC BLOOD PRESSURE: 157 MMHG | DIASTOLIC BLOOD PRESSURE: 98 MMHG

## 2021-01-08 DIAGNOSIS — N20.1 CALCULUS OF URETER: Primary | ICD-10-CM

## 2021-01-08 DIAGNOSIS — N20.0 KIDNEY STONE: ICD-10-CM

## 2021-01-08 LAB
BILIRUB UR QL STRIP: NEGATIVE
GLUCOSE UR-MCNC: NORMAL MG/DL
KETONES P FAST UR STRIP-MCNC: NEGATIVE MG/DL
PH UR STRIP: 5.5 [PH] (ref 4.6–8)
PROT UR QL STRIP: NORMAL
SP GR UR STRIP: 1.03 (ref 1–1.03)
UA UROBILINOGEN AMB POC: NORMAL (ref 0.2–1)
URINALYSIS CLARITY POC: CLEAR
URINALYSIS COLOR POC: YELLOW
URINE BLOOD POC: NORMAL
URINE LEUKOCYTES POC: NEGATIVE
URINE NITRITES POC: NEGATIVE

## 2021-01-08 PROCEDURE — 81003 URINALYSIS AUTO W/O SCOPE: CPT | Performed by: UROLOGY

## 2021-01-08 PROCEDURE — 99204 OFFICE O/P NEW MOD 45 MIN: CPT | Performed by: UROLOGY

## 2021-01-08 RX ORDER — OXYCODONE AND ACETAMINOPHEN 10; 325 MG/1; MG/1
1 TABLET ORAL
Qty: 10 TAB | Refills: 0 | Status: SHIPPED | OUTPATIENT
Start: 2021-01-08 | End: 2021-01-15

## 2021-01-08 NOTE — LETTER
1/8/2021 Patient: [facility] YOB: 1985 Date of Visit: 1/8/2021 Jacqueline Roa, [address] [place] [address],[phone] Via Fax: [phone] Dear MATHIEU Diaz, Thank you for referring Mr. [facility] to Aruna Chavis for evaluation. My notes for this consultation are attached. If you have questions, please do not hesitate to call me. I look forward to following your patient along with you. Sincerely, Estrella Pacheco MD

## 2021-01-08 NOTE — PROGRESS NOTES
HPI ROS PE NOTE          History of Present Illness   Chief complaint: Left ureteral calculus  Katy Roper is a 37 y.o. male who presents with left ureteral calculus; patient had about 1 day history of left flank and lower abdominal pain resulting in a trip to the emergency room at [facility] in [facility]; a CT scan of the abdomen and pelvis was performed which revealed several nonobstructing left renal calculi but a hydronephrosis associated with a distal ureteral calculus measured at 5.7 mm on the left side. The patient had an emergency room visit note which I have reviewed as part of this current office evaluation. The patient has had limited need for pain medication, was given an extended release oxycodone pill dosage was not noted the last of which was taken yesterday. At the time of his office visit he was not having active pain he did have some gross hematuria prior to his appearance at the emergency room. He did not have nausea and vomiting but says that a previous kidney stone some months earlier was accompanied by GI upset with nausea and vomiting. On the record there is a previous CT scan which I reviewed as part of this evaluation from September 2020 showing several stones in the kidney which were nonobstructive at that time. Apparently one of the larger stones in the kidney is now in the distal ureter on the January 4 CT scan which I also reviewed as part of this consultation. Patient has mild bladder outlet obstructive symptoms nocturia once per night frequency on occasion. He takes no medications for this. .Patient denies family history of genitourinary malignancy or family history of kidney stones. History reviewed. No pertinent past medical history. Past Surgical History:   Procedure Laterality Date    HX ORTHOPAEDIC Right     Thumb reattachment    HX SHOULDER ARTHROSCOPY       History reviewed. No pertinent family history.    Social History Tobacco Use    Smoking status: Former Smoker     Years: 3.00    Smokeless tobacco: Never Used   Substance Use Topics    Alcohol use: Yes     Alcohol/week: 2.0 standard drinks     Types: 2 Cans of beer per week       Prior to Admission medications    Medication Sig Start Date End Date Taking? Authorizing Provider   oxyCODONE-acetaminophen (PERCOCET 10)  mg per tablet Take 1 Tab by mouth every six (6) hours as needed for Pain for up to 7 days. Max Daily Amount: 4 Tabs. 1/8/21 1/15/21 Yes Amy Garcia MD   ibuprofen (MOTRIN) 800 mg tablet Take 1 Tab by mouth every eight (8) hours as needed for Pain for up to 7 days. 1/4/21 1/11/21 Yes Kaya Smiley MD   cyclobenzaprine (FLEXERIL) 10 mg tablet Take 1 Tab by mouth two (2) times a day. 1/4/21  Yes Kaya Smiley MD   oxyCODONE IR (Roxicodone) 5 mg immediate release tablet Take 1 Tab by mouth every six (6) hours as needed for Pain for up to 7 days. Max Daily Amount: 20 mg. 1/4/21 1/11/21 Yes Keyshawn Beauchamp MD   tamsulosin (Flomax) 0.4 mg capsule Take 1 Cap by mouth daily for 7 doses. 1/4/21 1/11/21 Yes Keyshawn Beauchamp MD     No Known Allergies     Review of Systems:  Constitutional: negative  Respiratory: negative  Cardiovascular: negative  Gastrointestinal: positive for constipation  Genitourinary:positive for frequency, nocturia and Left ureteral calculus with hydronephrosis  Hematologic/Lymphatic: negative  Musculoskeletal:negative  Neurological: negative  Behavioral/Psychiatric: negative     Physical Exam     Physical Exam:   Visit Vitals  BP (!) 157/98 (BP 1 Location: Right arm, BP Patient Position: Sitting)   Pulse 82   Temp 99 °F (37.2 °C) (Temporal)   Ht 5' 11\" (1.803 m)   Wt 249 lb (112.9 kg)   BMI 34.73 kg/m²     General appearance: alert, cooperative, no distress, appears stated age  Head: Normocephalic, without obvious abnormality, atraumatic  Nose: Nares normal. Septum midline. Mucosa normal. No drainage or sinus tenderness.   Back: symmetric, no curvature. ROM normal. No CVA tenderness. Lungs: clear to auscultation bilaterally  Chest wall: no tenderness  Heart: regular rate and rhythm, S1, S2 normal, no murmur, click, rub or gallop  Abdomen: soft, non-tender. Bowel sounds normal. No masses,  no organomegaly  Male genitalia: normal  Rectal: Normal tone, normal prostate, no masses or tenderness  Extremities: extremities normal, atraumatic, no cyanosis or edema  Pulses: 2+ and symmetric  Skin: Skin color, texture, turgor normal. No rashes or lesions    Data Review:   Recent Results (from the past 48 hour(s))   AMB POC URINALYSIS DIP STICK AUTO W/O MICRO    Collection Time: 01/08/21 10:56 AM   Result Value Ref Range    Color (UA POC) Yellow     Clarity (UA POC) Clear     Glucose (UA POC) 1+ Negative    Bilirubin (UA POC) Negative Negative    Ketones (UA POC) Negative Negative    Specific gravity (UA POC) 1.030 1.001 - 1.035    Blood (UA POC)      pH (UA POC) 5.5 4.6 - 8.0    Protein (UA POC) 1+ Negative    Urobilinogen (UA POC) 0.2 mg/dL 0.2 - 1    Nitrites (UA POC) Negative Negative    Leukocyte esterase (UA POC) Negative Negative     No results for input(s): 48 in the last 72 hours. Assessment and Plan:   Left ureteral calculus with hydronephrosis, recommend to strain urine which was not being done before; continue tamsulosin to assist in stone passage obtain KUB today to obtain visualization of the stone; and to schedule ESWL of the stone after KUB performed, ESWL scheduled for 1/14/2021  Nonobstructing left renal calculi may need further treatment at a later time  Mild bladder neck obstruction with nocturia and urinary frequency only symptoms, no treatment needed for this. Prescription for oxycodone acetaminophen 10/325 sent to pharmacy. Mr. Severiano Gear has a reminder for a \"due or due soon\" health maintenance. I have asked that he contact his primary care provider for follow-up on this health maintenance.       Jayme Charles. M.D.

## 2021-01-11 RX ORDER — ISOPROPYL ALCOHOL IN GLYCERIN 95 %-5 %
DROPS OTIC (EAR) AS NEEDED
COMMUNITY
End: 2021-02-11

## 2021-01-16 ENCOUNTER — HOSPITAL ENCOUNTER (OUTPATIENT)
Dept: PREADMISSION TESTING | Age: 36
Discharge: HOME | End: 2021-01-16
Payer: COMMERCIAL

## 2021-01-16 LAB — SARS-COV-2, COV2: NORMAL

## 2021-01-16 PROCEDURE — U0003 INFECTIOUS AGENT DETECTION BY NUCLEIC ACID (DNA OR RNA); SEVERE ACUTE RESPIRATORY SYNDROME CORONAVIRUS 2 (SARS-COV-2) (CORONAVIRUS DISEASE [COVID-19]), AMPLIFIED PROBE TECHNIQUE, MAKING USE OF HIGH THROUGHPUT TECHNOLOGIES AS DESCRIBED BY CMS-2020-01-R: HCPCS

## 2021-01-17 LAB — SARS-COV-2, COV2NT: NOT DETECTED

## 2021-01-18 ENCOUNTER — HOSPITAL ENCOUNTER (OUTPATIENT)
Dept: GENERAL RADIOLOGY | Age: 36
Discharge: HOME | End: 2021-01-18
Payer: COMMERCIAL

## 2021-01-18 DIAGNOSIS — N20.0 KIDNEY STONE: ICD-10-CM

## 2021-01-18 PROCEDURE — 74018 RADEX ABDOMEN 1 VIEW: CPT

## 2021-01-19 ENCOUNTER — HOSPITAL ENCOUNTER (OUTPATIENT)
Dept: CT IMAGING | Age: 36
Discharge: HOME | End: 2021-01-19
Attending: UROLOGY
Payer: COMMERCIAL

## 2021-01-19 ENCOUNTER — TRANSCRIBE ORDER (OUTPATIENT)
Dept: SCHEDULING | Age: 36
End: 2021-01-19

## 2021-01-19 DIAGNOSIS — N20.1 CALCULUS OF URETER: Primary | ICD-10-CM

## 2021-01-19 DIAGNOSIS — N20.0 URIC ACID NEPHROLITHIASIS: ICD-10-CM

## 2021-01-19 DIAGNOSIS — N20.1 CALCULUS OF URETER: ICD-10-CM

## 2021-01-19 DIAGNOSIS — N20.1 URETERAL STONE: Primary | ICD-10-CM

## 2021-01-19 PROCEDURE — 74176 CT ABD & PELVIS W/O CONTRAST: CPT

## 2021-01-20 ENCOUNTER — ANESTHESIA (OUTPATIENT)
Dept: SURGERY | Age: 36
End: 2021-01-20
Payer: COMMERCIAL

## 2021-01-20 ENCOUNTER — APPOINTMENT (OUTPATIENT)
Dept: GENERAL RADIOLOGY | Age: 36
End: 2021-01-20
Attending: UROLOGY
Payer: COMMERCIAL

## 2021-01-20 ENCOUNTER — ANESTHESIA EVENT (OUTPATIENT)
Dept: SURGERY | Age: 36
End: 2021-01-20
Payer: COMMERCIAL

## 2021-01-20 ENCOUNTER — HOSPITAL ENCOUNTER (OUTPATIENT)
Age: 36
Discharge: HOME | End: 2021-01-20
Attending: UROLOGY | Admitting: UROLOGY
Payer: COMMERCIAL

## 2021-01-20 VITALS
WEIGHT: 249 LBS | HEIGHT: 71 IN | OXYGEN SATURATION: 98 % | BODY MASS INDEX: 34.86 KG/M2 | TEMPERATURE: 97.5 F | RESPIRATION RATE: 18 BRPM | SYSTOLIC BLOOD PRESSURE: 125 MMHG | DIASTOLIC BLOOD PRESSURE: 77 MMHG | HEART RATE: 74 BPM

## 2021-01-20 DIAGNOSIS — N20.1 CALCULUS OF URETER: Primary | ICD-10-CM

## 2021-01-20 LAB
APTT PPP: 33.2 SEC (ref 23–35.7)
INR PPP: 0.9 (ref 0.9–1.1)
PROTHROMBIN TIME: 12 SEC (ref 11.9–14.7)
THERAPEUTIC RANGE,PTTT: NORMAL SEC (ref 68–109)

## 2021-01-20 PROCEDURE — 74011250636 HC RX REV CODE- 250/636: Performed by: NURSE ANESTHETIST, CERTIFIED REGISTERED

## 2021-01-20 PROCEDURE — 76010000161 HC OR TIME 1 TO 1.5 HR INTENSV-TIER 1: Performed by: UROLOGY

## 2021-01-20 PROCEDURE — 85610 PROTHROMBIN TIME: CPT

## 2021-01-20 PROCEDURE — 74011250636 HC RX REV CODE- 250/636: Performed by: UROLOGY

## 2021-01-20 PROCEDURE — 36415 COLL VENOUS BLD VENIPUNCTURE: CPT

## 2021-01-20 PROCEDURE — 76060000033 HC ANESTHESIA 1 TO 1.5 HR: Performed by: UROLOGY

## 2021-01-20 PROCEDURE — 2709999900 HC NON-CHARGEABLE SUPPLY: Performed by: UROLOGY

## 2021-01-20 PROCEDURE — 76210000006 HC OR PH I REC 0.5 TO 1 HR: Performed by: UROLOGY

## 2021-01-20 PROCEDURE — 74011000250 HC RX REV CODE- 250: Performed by: NURSE ANESTHETIST, CERTIFIED REGISTERED

## 2021-01-20 PROCEDURE — 74019 RADEX ABDOMEN 2 VIEWS: CPT

## 2021-01-20 PROCEDURE — 85730 THROMBOPLASTIN TIME PARTIAL: CPT

## 2021-01-20 PROCEDURE — 74011000250 HC RX REV CODE- 250: Performed by: UROLOGY

## 2021-01-20 PROCEDURE — 76210000022 HC REC RM PH II 1.5 TO 2 HR: Performed by: UROLOGY

## 2021-01-20 RX ORDER — HYDROCODONE BITARTRATE AND ACETAMINOPHEN 5; 325 MG/1; MG/1
1 TABLET ORAL AS NEEDED
Status: DISCONTINUED | OUTPATIENT
Start: 2021-01-20 | End: 2021-01-20 | Stop reason: HOSPADM

## 2021-01-20 RX ORDER — SODIUM CHLORIDE 0.9 % (FLUSH) 0.9 %
5-40 SYRINGE (ML) INJECTION AS NEEDED
Status: DISCONTINUED | OUTPATIENT
Start: 2021-01-20 | End: 2021-01-20 | Stop reason: HOSPADM

## 2021-01-20 RX ORDER — LIDOCAINE HYDROCHLORIDE 20 MG/ML
INJECTION, SOLUTION EPIDURAL; INFILTRATION; INTRACAUDAL; PERINEURAL AS NEEDED
Status: DISCONTINUED | OUTPATIENT
Start: 2021-01-20 | End: 2021-01-20 | Stop reason: HOSPADM

## 2021-01-20 RX ORDER — PHENYLEPHRINE HCL IN 0.9% NACL 0.4MG/10ML
SYRINGE (ML) INTRAVENOUS AS NEEDED
Status: DISCONTINUED | OUTPATIENT
Start: 2021-01-20 | End: 2021-01-20 | Stop reason: HOSPADM

## 2021-01-20 RX ORDER — SODIUM CHLORIDE 0.9 % (FLUSH) 0.9 %
5-40 SYRINGE (ML) INJECTION EVERY 8 HOURS
Status: DISCONTINUED | OUTPATIENT
Start: 2021-01-20 | End: 2021-01-20 | Stop reason: HOSPADM

## 2021-01-20 RX ORDER — MIDAZOLAM HYDROCHLORIDE 1 MG/ML
0.5 INJECTION, SOLUTION INTRAMUSCULAR; INTRAVENOUS
Status: DISCONTINUED | OUTPATIENT
Start: 2021-01-20 | End: 2021-01-20 | Stop reason: HOSPADM

## 2021-01-20 RX ORDER — METOPROLOL TARTRATE 5 MG/5ML
2.5 INJECTION INTRAVENOUS
Status: DISCONTINUED | OUTPATIENT
Start: 2021-01-20 | End: 2021-01-20 | Stop reason: HOSPADM

## 2021-01-20 RX ORDER — ONDANSETRON 2 MG/ML
4 INJECTION INTRAMUSCULAR; INTRAVENOUS AS NEEDED
Status: DISCONTINUED | OUTPATIENT
Start: 2021-01-20 | End: 2021-01-20 | Stop reason: HOSPADM

## 2021-01-20 RX ORDER — ONDANSETRON 2 MG/ML
INJECTION INTRAMUSCULAR; INTRAVENOUS AS NEEDED
Status: DISCONTINUED | OUTPATIENT
Start: 2021-01-20 | End: 2021-01-20 | Stop reason: HOSPADM

## 2021-01-20 RX ORDER — HYDRALAZINE HYDROCHLORIDE 20 MG/ML
10 INJECTION INTRAMUSCULAR; INTRAVENOUS
Status: DISCONTINUED | OUTPATIENT
Start: 2021-01-20 | End: 2021-01-20 | Stop reason: HOSPADM

## 2021-01-20 RX ORDER — DEXMEDETOMIDINE HYDROCHLORIDE 100 UG/ML
INJECTION, SOLUTION INTRAVENOUS AS NEEDED
Status: DISCONTINUED | OUTPATIENT
Start: 2021-01-20 | End: 2021-01-20 | Stop reason: HOSPADM

## 2021-01-20 RX ORDER — LIDOCAINE HYDROCHLORIDE 10 MG/ML
0.1 INJECTION, SOLUTION EPIDURAL; INFILTRATION; INTRACAUDAL; PERINEURAL AS NEEDED
Status: DISCONTINUED | OUTPATIENT
Start: 2021-01-20 | End: 2021-01-20 | Stop reason: HOSPADM

## 2021-01-20 RX ORDER — HYDROMORPHONE HYDROCHLORIDE 1 MG/ML
0.4 INJECTION, SOLUTION INTRAMUSCULAR; INTRAVENOUS; SUBCUTANEOUS
Status: DISCONTINUED | OUTPATIENT
Start: 2021-01-20 | End: 2021-01-20 | Stop reason: HOSPADM

## 2021-01-20 RX ORDER — FENTANYL CITRATE 50 UG/ML
50 INJECTION, SOLUTION INTRAMUSCULAR; INTRAVENOUS
Status: DISCONTINUED | OUTPATIENT
Start: 2021-01-20 | End: 2021-01-20 | Stop reason: HOSPADM

## 2021-01-20 RX ORDER — MIDAZOLAM HYDROCHLORIDE 1 MG/ML
INJECTION, SOLUTION INTRAMUSCULAR; INTRAVENOUS AS NEEDED
Status: DISCONTINUED | OUTPATIENT
Start: 2021-01-20 | End: 2021-01-20 | Stop reason: HOSPADM

## 2021-01-20 RX ORDER — OXYCODONE AND ACETAMINOPHEN 10; 325 MG/1; MG/1
1 TABLET ORAL
Qty: 10 TAB | Refills: 0 | Status: SHIPPED | OUTPATIENT
Start: 2021-01-20 | End: 2021-01-27

## 2021-01-20 RX ORDER — DIPHENHYDRAMINE HYDROCHLORIDE 50 MG/ML
12.5 INJECTION, SOLUTION INTRAMUSCULAR; INTRAVENOUS AS NEEDED
Status: DISCONTINUED | OUTPATIENT
Start: 2021-01-20 | End: 2021-01-20 | Stop reason: HOSPADM

## 2021-01-20 RX ORDER — MIDAZOLAM HYDROCHLORIDE 1 MG/ML
1 INJECTION, SOLUTION INTRAMUSCULAR; INTRAVENOUS AS NEEDED
Status: DISCONTINUED | OUTPATIENT
Start: 2021-01-20 | End: 2021-01-20 | Stop reason: HOSPADM

## 2021-01-20 RX ORDER — FENTANYL CITRATE 50 UG/ML
50 INJECTION, SOLUTION INTRAMUSCULAR; INTRAVENOUS AS NEEDED
Status: DISCONTINUED | OUTPATIENT
Start: 2021-01-20 | End: 2021-01-20 | Stop reason: HOSPADM

## 2021-01-20 RX ORDER — MORPHINE SULFATE 10 MG/ML
2 INJECTION, SOLUTION INTRAMUSCULAR; INTRAVENOUS
Status: DISCONTINUED | OUTPATIENT
Start: 2021-01-20 | End: 2021-01-20 | Stop reason: HOSPADM

## 2021-01-20 RX ORDER — NORETHINDRONE AND ETHINYL ESTRADIOL 0.5-0.035
5 KIT ORAL AS NEEDED
Status: DISCONTINUED | OUTPATIENT
Start: 2021-01-20 | End: 2021-01-20 | Stop reason: HOSPADM

## 2021-01-20 RX ORDER — SODIUM CHLORIDE, SODIUM LACTATE, POTASSIUM CHLORIDE, CALCIUM CHLORIDE 600; 310; 30; 20 MG/100ML; MG/100ML; MG/100ML; MG/100ML
INJECTION, SOLUTION INTRAVENOUS
Status: DISCONTINUED | OUTPATIENT
Start: 2021-01-20 | End: 2021-01-20 | Stop reason: HOSPADM

## 2021-01-20 RX ORDER — PROPOFOL 10 MG/ML
INJECTION, EMULSION INTRAVENOUS AS NEEDED
Status: DISCONTINUED | OUTPATIENT
Start: 2021-01-20 | End: 2021-01-20 | Stop reason: HOSPADM

## 2021-01-20 RX ORDER — LABETALOL HCL 20 MG/4 ML
5 SYRINGE (ML) INTRAVENOUS
Status: DISCONTINUED | OUTPATIENT
Start: 2021-01-20 | End: 2021-01-20 | Stop reason: HOSPADM

## 2021-01-20 RX ORDER — FENTANYL CITRATE 50 UG/ML
INJECTION, SOLUTION INTRAMUSCULAR; INTRAVENOUS AS NEEDED
Status: DISCONTINUED | OUTPATIENT
Start: 2021-01-20 | End: 2021-01-20 | Stop reason: HOSPADM

## 2021-01-20 RX ADMIN — ONDANSETRON 4 MG: 2 INJECTION INTRAMUSCULAR; INTRAVENOUS at 10:25

## 2021-01-20 RX ADMIN — FENTANYL CITRATE 50 MCG: 50 INJECTION, SOLUTION INTRAMUSCULAR; INTRAVENOUS at 10:26

## 2021-01-20 RX ADMIN — SODIUM CHLORIDE, POTASSIUM CHLORIDE, SODIUM LACTATE AND CALCIUM CHLORIDE: 600; 310; 30; 20 INJECTION, SOLUTION INTRAVENOUS at 10:07

## 2021-01-20 RX ADMIN — Medication 200 MCG: at 10:46

## 2021-01-20 RX ADMIN — DEXMEDETOMIDINE HYDROCHLORIDE 10 MCG: 100 INJECTION, SOLUTION INTRAVENOUS at 10:28

## 2021-01-20 RX ADMIN — DEXMEDETOMIDINE HYDROCHLORIDE 10 MCG: 100 INJECTION, SOLUTION INTRAVENOUS at 10:33

## 2021-01-20 RX ADMIN — Medication 200 MCG: at 11:01

## 2021-01-20 RX ADMIN — PROPOFOL 200 MG: 10 INJECTION, EMULSION INTRAVENOUS at 10:18

## 2021-01-20 RX ADMIN — CEFAZOLIN SODIUM 1 G: 1 INJECTION, POWDER, FOR SOLUTION INTRAMUSCULAR; INTRAVENOUS at 10:27

## 2021-01-20 RX ADMIN — LIDOCAINE HYDROCHLORIDE 100 MG: 20 INJECTION, SOLUTION EPIDURAL; INFILTRATION; INTRACAUDAL; PERINEURAL at 10:18

## 2021-01-20 RX ADMIN — Medication 200 MCG: at 10:56

## 2021-01-20 RX ADMIN — Medication 200 MCG: at 10:41

## 2021-01-20 RX ADMIN — Medication 200 MCG: at 11:16

## 2021-01-20 RX ADMIN — DEXMEDETOMIDINE HYDROCHLORIDE 10 MCG: 100 INJECTION, SOLUTION INTRAVENOUS at 10:22

## 2021-01-20 RX ADMIN — Medication 200 MCG: at 11:05

## 2021-01-20 RX ADMIN — Medication 200 MCG: at 10:38

## 2021-01-20 RX ADMIN — MIDAZOLAM HYDROCHLORIDE 4 MG: 2 INJECTION, SOLUTION INTRAMUSCULAR; INTRAVENOUS at 10:07

## 2021-01-20 NOTE — OP NOTES
[facility]  OPERATIVE REPORT    Name:  Cecilia Mccrary  MR#:  [id number]  :  1985  ACCOUNT #:  [de-identified]  DATE OF SERVICE:  2021    HISTORY:  This is a 62-year-old man with a distal left ureteral calculus 6 mm, who is undergoing extracorporeal shockwave lithotripsy for resolution of the stone. PREOPERATIVE DIAGNOSIS:  Left ureteral calculus. POSTOPERATIVE DIAGNOSIS:  Left ureteral calculus. PROCEDURE PERFORMED:  Extracorporeal shockwave lithotripsy. SURGEON:  Madeline Bajwa MD    ASSISTANT:  Naseem Adames, radiology technician. ANESTHESIA:  General.    COMPLICATIONS:  None. SPECIMENS REMOVED:  None. IMPLANTS:  None. ESTIMATED BLOOD LOSS:  Negligible. PROCEDURE:  With the patient in the supine position, under satisfactory general anesthesia, the patient was placed in the lithotripsy table and water was placed in the bowl behind the patient. After proper positioning with x-ray control, the lithotripsy unit was set fluoroscopically and shocks were delivered to the stone which was well-visualized on the fluoroscopy. A total of 4000 shocks was administered with maximum power at 9.0. At the close of the procedure, there appeared to be good pulverization of the stone. We will obtain a KUB postoperatively to ascertain this. After completion of the treatment, the patient was returned to the recovery room in satisfactory condition having tolerated the procedure well. FINAL DIAGNOSIS:  Left ureteral calculus. DRAINS:  None. FINDINGS:  As noted on the previously dictated procedure.       MD SUREKHA [email or web address]  D:  2021 11:33  T:  2021 14:42  JOB #:  [id number]  CC:  MATHIEU Vázquez

## 2021-01-20 NOTE — ANESTHESIA PREPROCEDURE EVALUATION
Relevant Problems   No relevant active problems       Anesthetic History   No history of anesthetic complications            Review of Systems / Medical History  Patient summary reviewed, nursing notes reviewed and pertinent labs reviewed    Pulmonary  Within defined limits                 Neuro/Psych   Within defined limits           Cardiovascular  Within defined limits                Exercise tolerance: >4 METS     GI/Hepatic/Renal  Within defined limits       Renal disease: stones       Endo/Other        Obesity     Other Findings            Past Medical History:   Diagnosis Date    Kidney stone        Past Surgical History:   Procedure Laterality Date    HX ORTHOPAEDIC Right     Thumb reattachment    HX SHOULDER ARTHROSCOPY         Current Outpatient Medications   Medication Instructions    Aspirin-Caffeine (BC Pain Relief) 845-65 mg pwpk Oral, AS NEEDED    cyclobenzaprine (FLEXERIL) 10 mg, Oral, 2 TIMES DAILY       Current Facility-Administered Medications   Medication Dose Route Frequency    ceFAZolin (ANCEF) 1 g in sterile water (preservative free) 10 mL IV syringe  1 g IntraVENous ONCE       Patient Vitals for the past 24 hrs:   Temp Pulse Resp BP SpO2   01/20/21 0711 36.9 °C (98.5 °F) 77 20 (!) 150/91 99 %       Lab Results   Component Value Date/Time    WBC 8.9 09/20/2020 05:54 AM    HGB 14.1 09/20/2020 05:54 AM    HCT 41.0 09/20/2020 05:54 AM    PLATELET 784 39/68/2036 05:54 AM    MCV 91.2 09/20/2020 05:54 AM     Lab Results   Component Value Date/Time    Sodium 140 09/20/2020 05:54 AM    Potassium 4.1 09/20/2020 05:54 AM    Chloride 104 09/20/2020 05:54 AM    CO2 29 09/20/2020 05:54 AM    Anion gap 7 09/20/2020 05:54 AM    Glucose 116 (H) 09/20/2020 05:54 AM    BUN 14 09/20/2020 05:54 AM    Creatinine 1.33 (H) 09/20/2020 05:54 AM    BUN/Creatinine ratio 11 (L) 09/20/2020 05:54 AM    GFR est AA >60 09/20/2020 05:54 AM    GFR est non-AA >60 09/20/2020 05:54 AM    Calcium 9.5 09/20/2020 05:54 AM Lab Results   Component Value Date/Time    APTT 33.2 01/20/2021 07:15 AM    PTP 12.0 01/20/2021 07:15 AM    INR 0.9 01/20/2021 07:15 AM     Lab Results   Component Value Date/Time    Glucose 116 (H) 09/20/2020 05:54 AM     Physical Exam    Airway  Mallampati: II  TM Distance: 4 - 6 cm  Neck ROM: normal range of motion   Mouth opening: Normal     Cardiovascular    Rhythm: regular  Rate: normal         Dental  No notable dental hx       Pulmonary  Breath sounds clear to auscultation               Abdominal  GI exam deferred       Other Findings            Anesthetic Plan    ASA: 2  Anesthesia type: general          Induction: Intravenous  Anesthetic plan and risks discussed with: Patient and Family

## 2021-01-20 NOTE — ANESTHESIA POSTPROCEDURE EVALUATION
Procedure(s):  LEFT URETERAL EXTRACORPOREAL SHOCKWAVE  LITHOTRIPSY.     general    Anesthesia Post Evaluation      Multimodal analgesia: multimodal analgesia used between 6 hours prior to anesthesia start to PACU discharge  Patient location during evaluation: PACU  Patient participation: complete - patient participated  Level of consciousness: awake  Pain score: 0  Pain management: adequate  Airway patency: patent  Anesthetic complications: no  Cardiovascular status: acceptable  Respiratory status: acceptable  Hydration status: acceptable  Post anesthesia nausea and vomiting:  controlled  Final Post Anesthesia Temperature Assessment:  Normothermia (36.0-37.5 degrees C)      INITIAL Post-op Vital signs:   Vitals Value Taken Time   /74 01/20/21 1150   Temp 36.5 °C (97.7 °F) 01/20/21 1125   Pulse 74 01/20/21 1150   Resp 12 01/20/21 1150   SpO2 98 % 01/20/21 1150

## 2021-01-20 NOTE — PROGRESS NOTES
Pt ambulated to the BR and voided on a strainer, urine is pink some sediments noted on strainer, pt awaiting KUB at 1300hrs

## 2021-01-20 NOTE — BRIEF OP NOTE
Brief Postoperative Note    Patient: Marcela Jama  YOB: 1985  MRN: [id number]    Date of Procedure: 1/20/2021     Pre-Op Diagnosis: Ureteral stone [N20.1]    Post-Op Diagnosis: Same as preoperative diagnosis.       Procedure(s):  LEFT URETERAL EXTRACORPOREAL SHOCKWAVE  LITHOTRIPSY    Surgeon(s):  Black Marshall MD    Surgical Assistant: None    Anesthesia: General     Estimated Blood Loss (mL): Minimal    Complications: None    Specimens: none     Implants: none    Drains: none    Findings: distal 6mm left ureteral calculus at UVJ: good pulverization post ESWL per fluoroscoopy view    Electronically Signed by Maria Dolores Hernandez MD on 1/20/2021 at 11:15 AM

## 2021-02-11 ENCOUNTER — HOSPITAL ENCOUNTER (EMERGENCY)
Age: 36
Discharge: HOME | End: 2021-02-11
Attending: EMERGENCY MEDICINE
Payer: COMMERCIAL

## 2021-02-11 ENCOUNTER — APPOINTMENT (OUTPATIENT)
Dept: GENERAL RADIOLOGY | Age: 36
End: 2021-02-11
Attending: EMERGENCY MEDICINE
Payer: COMMERCIAL

## 2021-02-11 VITALS
DIASTOLIC BLOOD PRESSURE: 100 MMHG | RESPIRATION RATE: 18 BRPM | OXYGEN SATURATION: 98 % | TEMPERATURE: 98.2 F | HEART RATE: 78 BPM | SYSTOLIC BLOOD PRESSURE: 166 MMHG | WEIGHT: 265 LBS | HEIGHT: 71 IN | BODY MASS INDEX: 37.1 KG/M2

## 2021-02-11 DIAGNOSIS — M54.30 SCIATICA, UNSPECIFIED LATERALITY: ICD-10-CM

## 2021-02-11 DIAGNOSIS — M62.830 LUMBAR PARASPINAL MUSCLE SPASM: Primary | ICD-10-CM

## 2021-02-11 PROCEDURE — 72100 X-RAY EXAM L-S SPINE 2/3 VWS: CPT

## 2021-02-11 PROCEDURE — 99283 EMERGENCY DEPT VISIT LOW MDM: CPT

## 2021-02-11 PROCEDURE — 74011250637 HC RX REV CODE- 250/637: Performed by: EMERGENCY MEDICINE

## 2021-02-11 RX ORDER — DIAZEPAM 2 MG/1
5 TABLET ORAL
Qty: 20 TAB | Refills: 0 | OUTPATIENT
Start: 2021-02-11 | End: 2021-05-19

## 2021-02-11 RX ORDER — PREDNISONE 50 MG/1
50 TABLET ORAL DAILY
Qty: 5 TAB | Refills: 0 | Status: SHIPPED | OUTPATIENT
Start: 2021-02-11 | End: 2021-02-16

## 2021-02-11 RX ORDER — KETOROLAC TROMETHAMINE 10 MG/1
10 TABLET, FILM COATED ORAL ONCE
Status: COMPLETED | OUTPATIENT
Start: 2021-02-11 | End: 2021-02-11

## 2021-02-11 RX ORDER — NAPROXEN 500 MG/1
500 TABLET ORAL 2 TIMES DAILY WITH MEALS
Qty: 30 TAB | Refills: 0 | OUTPATIENT
Start: 2021-02-11 | End: 2021-05-19

## 2021-02-11 RX ADMIN — KETOROLAC TROMETHAMINE 10 MG: 10 TABLET, FILM COATED ORAL at 11:42

## 2021-02-11 NOTE — ED PROVIDER NOTES
EMERGENCY DEPARTMENT HISTORY AND PHYSICAL EXAM      Date: 2/11/2021  Patient Name: Ngoc Tyler    History of Presenting Illness     Chief Complaint   Patient presents with    Hip Pain       History Provided By: Patient    HPI: Ngoc Tyler, 37 y.o. male with a past medical history significant No significant past medical history presents to the ED with chief complaint of Hip Pain  . 31-year-old who is otherwise healthy is complained of left lower back pain that radiates posterior down the left leg. Comes and goes at different times. Has never gone all the way down to his foot. No spine tenderness. No numbness or weakness. No incontinence. No pain medicine prior to arrival.  Describes it as moderate. There are no other complaints, changes, or physical findings at this time. PCP: MATHIEU Montano    Current Facility-Administered Medications   Medication Dose Route Frequency Provider Last Rate Last Admin    ketorolac (TORADOL) tablet 10 mg  10 mg Oral ONCE Riky Staley MD         Current Outpatient Medications   Medication Sig Dispense Refill    predniSONE (DELTASONE) 50 mg tablet Take 1 Tab by mouth daily for 5 days. 5 Tab 0    diazePAM (Valium) 2 mg tablet Take 2.5 Tabs by mouth every eight (8) hours as needed for Muscle Spasm(s). Max Daily Amount: 15 mg. 20 Tab 0    naproxen (NAPROSYN) 500 mg tablet Take 1 Tab by mouth two (2) times daily (with meals).  30 Tab 0       Past History     Past Medical History:  Past Medical History:   Diagnosis Date    Kidney stone        Past Surgical History:  Past Surgical History:   Procedure Laterality Date    HX LITHOTRIPSY  01/20/2021    Extracorporeal shockwave lithotripsy    HX ORTHOPAEDIC Right     Thumb reattachment    HX SHOULDER ARTHROSCOPY         Family History:  Family History   Problem Relation Age of Onset    No Known Problems Mother     No Known Problems Father        Social History:  Social History     Tobacco Use    Smoking status: Former Smoker     Years: 3.00    Smokeless tobacco: Never Used    Tobacco comment: pt states quit several years    Substance Use Topics    Alcohol use: Yes     Alcohol/week: 2.0 standard drinks     Types: 2 Cans of beer per week     Comment: per week per pt    Drug use: Not Currently     Types: Marijuana     Comment: pt states last used on 1/1/2021       Allergies:  No Known Allergies      Review of Systems   Review of Systems   Constitutional: Negative. Negative for chills, fatigue and fever. HENT: Negative. Negative for congestion, ear pain, nosebleeds and sore throat. Eyes: Negative. Negative for pain, discharge and visual disturbance. Respiratory: Negative. Negative for cough, chest tightness and shortness of breath. Cardiovascular: Negative. Negative for chest pain and leg swelling. Gastrointestinal: Negative. Negative for abdominal pain, blood in stool, constipation, diarrhea, nausea and vomiting. Endocrine: Negative. Genitourinary: Negative. Negative for difficulty urinating, dysuria and flank pain. Musculoskeletal: Positive for back pain. Negative for myalgias. Skin: Negative. Negative for rash and wound. Allergic/Immunologic: Negative. Neurological: Negative. Negative for dizziness, syncope, weakness, numbness and headaches. Hematological: Negative. Does not bruise/bleed easily. Psychiatric/Behavioral: Negative. Negative for agitation, confusion, hallucinations and suicidal ideas. All other systems reviewed and are negative. Physical Exam   Physical Exam  Vitals signs and nursing note reviewed. Constitutional:       General: He is not in acute distress. Appearance: He is normal weight. He is not ill-appearing. HENT:      Head: Normocephalic and atraumatic. Right Ear: External ear normal.      Left Ear: External ear normal.      Nose: Nose normal. No rhinorrhea.       Mouth/Throat:      Mouth: Mucous membranes are moist. Pharynx: Oropharynx is clear. Eyes:      Extraocular Movements: Extraocular movements intact. Conjunctiva/sclera: Conjunctivae normal.      Pupils: Pupils are equal, round, and reactive to light. Neck:      Musculoskeletal: Normal range of motion and neck supple. Cardiovascular:      Rate and Rhythm: Normal rate and regular rhythm. Pulses: Normal pulses. Heart sounds: Normal heart sounds. Pulmonary:      Effort: Pulmonary effort is normal. No respiratory distress. Breath sounds: Normal breath sounds. Abdominal:      General: Abdomen is flat. Bowel sounds are normal.      Palpations: Abdomen is soft. Musculoskeletal: Normal range of motion. General: No tenderness or deformity. Comments: No spine tenderness. Tenderness in the paraspinal left side. Positive straight leg raise. No thigh knee calf tenderness. 2+ DP pulse. Skin:     General: Skin is warm and dry. Capillary Refill: Capillary refill takes less than 2 seconds. Findings: No bruising, lesion or rash. Neurological:      General: No focal deficit present. Mental Status: He is alert and oriented to person, place, and time. Mental status is at baseline. Psychiatric:         Mood and Affect: Mood normal.         Behavior: Behavior normal.         Thought Content: Thought content normal.         Judgment: Judgment normal.         Diagnostic Study Results     Labs -   No results found for this or any previous visit (from the past 12 hour(s)). Radiologic Studies -   XR SPINE LUMB 2 OR 3 V   Final Result   The vertebral bodies show normal alignment, with no fracture or   destructive lesion. The disc spaces are maintained, as are the posterior   elements and adjacent soft tissues. Spasm noted spasm noted        CT Results  (Last 48 hours)    None        CXR Results  (Last 48 hours)    None          Medical Decision Making and ED Course   I am the first provider for this patient.     I reviewed the vital signs, available nursing notes, past medical history, past surgical history, family history and social history. Vital Signs-Reviewed the patient's vital signs. Patient Vitals for the past 12 hrs:   Temp Pulse Resp BP SpO2   02/11/21 1028 98.2 °F (36.8 °C) 78 18 (!) 166/100 98 %       EKG interpretation:         Records Reviewed: Previous Hospital chart. EMS run report      ED Course:   Initial assessment performed. The patients presenting problems have been discussed, and they are in agreement with the care plan formulated and outlined with them. I have encouraged them to ask questions as they arise throughout their visit. Orders Placed This Encounter    XR SPINE LUMB 2 OR 3 V     Standing Status:   Standing     Number of Occurrences:   1     Order Specific Question:   Transport     Answer:   BED [2]     Order Specific Question:   Reason for Exam     Answer:   scialica    ketorolac (TORADOL) tablet 10 mg    predniSONE (DELTASONE) 50 mg tablet     Sig: Take 1 Tab by mouth daily for 5 days. Dispense:  5 Tab     Refill:  0    diazePAM (Valium) 2 mg tablet     Sig: Take 2.5 Tabs by mouth every eight (8) hours as needed for Muscle Spasm(s). Max Daily Amount: 15 mg. Dispense:  20 Tab     Refill:  0    naproxen (NAPROSYN) 500 mg tablet     Sig: Take 1 Tab by mouth two (2) times daily (with meals). Dispense:  30 Tab     Refill:  0              CONSULTANTS:  Consults      Provider Notes (Medical Decision Making):   57-year-old male with low lateral left lower back pain with radiation down his left leg. Neurovascularly intact. No evidence of cord compression cauda equina. Clinically is sciatica. X-ray suggest muscle spasm. Plan for muscle relaxants pain medicine and discharged home with steroids. Primary care follow-up if needed. Procedures                       Disposition       Emergency Department Disposition:  Discharged         DISCHARGE PLAN:    Patient is discharged home. Discharge instructions provided. Patient is stable and improved at time of disposition. Vitals are stable. 1. Cannot display discharge medications since this patient is not currently admitted. 2.   Follow-up Information     Follow up With Specialties Details Why [address], [address], [facility]   [address] [place] [address]  [phone]          3. Return to ED if worse     Pt voiced they understand they plan and do not have questions at this time        Diagnosis     Clinical Impression:   1. Lumbar paraspinal muscle spasm    2. Sciatica, unspecified laterality        Attestations:    Kylah Peterson MD    Please note that this dictation was completed with [employer]ogn[employer], the computer voice recognition software. Quite often unanticipated grammatical, syntax, homophones, and other interpretive errors are inadvertently transcribed by the computer software. Please disregard these errors. Please excuse any errors that have escaped final proofreading. Thank you.

## 2021-02-11 NOTE — DISCHARGE INSTRUCTIONS
Thank you! Thank you for allowing me to care for you in the emergency department. I sincerely hope that you are satisfied with your visit today. It is my goal to provide you with excellent care. Below you will find a list of your labs and imaging from your visit today. Should you have any questions regarding these results please do not hesitate to call the emergency department. Labs -   No results found for this or any previous visit (from the past 12 hour(s)). Radiologic Studies -   XR SPINE LUMB 2 OR 3 V   Final Result   The vertebral bodies show normal alignment, with no fracture or   destructive lesion. The disc spaces are maintained, as are the posterior   elements and adjacent soft tissues. Spasm noted spasm noted        CT Results  (Last 48 hours)      None          CXR Results  (Last 48 hours)      None               If you feel that you have not received excellent quality care or timely care, please ask to speak to the nurse manager. Please choose us in the future for your continued health care needs. ------------------------------------------------------------------------------------------------------------  The exam and treatment you received in the Emergency Department were for an urgent problem and are not intended as complete care. It is important that you follow-up with a doctor, nurse practitioner, or physician assistant to:  (1) confirm your diagnosis,  (2) re-evaluation of changes in your illness and treatment, and  (3) for ongoing care. If your symptoms become worse or you do not improve as expected and you are unable to reach your usual health care provider, you should return to the Emergency Department. We are available 24 hours a day. Please take your discharge instructions with you when you go to your follow-up appointment. If you have any problem arranging a follow-up appointment, contact the Emergency Department immediately.     If a prescription has been provided, please have it filled as soon as possible to prevent a delay in treatment. Read the entire medication instruction sheet provided to you by the pharmacy. If you have any questions or reservations about taking the medication due to side effects or interactions with other medications, please call your primary care physician or contact the ER to speak with the charge nurse. Make an appointment with your family doctor or the physician you were referred to for follow-up of this visit as instructed on your discharge paperwork, as this is a mandatory follow-up. Return to the ER if you are unable to be seen or if you are unable to be seen in a timely manner. If you have any problem arranging the follow-up visit, contact the Emergency Department immediately.

## 2021-02-11 NOTE — LETTER
[address] [place] [address] EMERGENCY DEPT 
[address] [place] [address] 
[phone] Work/School Note Date: 2/11/2021 To Whom It May concern: 
 
Alee Salvador was seen and treated today in the emergency room by the following provider(s): 
Attending Provider: Ezekiel Boss MD. Alee Salvador is excused from work/school on 02/11/21 and 02/12/21. He is medically clear to return to work/school on 2/13/2021. Sincerely, Vonda Ayala MD

## 2021-05-19 ENCOUNTER — HOSPITAL ENCOUNTER (EMERGENCY)
Age: 36
Discharge: HOME | End: 2021-05-19
Payer: COMMERCIAL

## 2021-05-19 VITALS
SYSTOLIC BLOOD PRESSURE: 145 MMHG | DIASTOLIC BLOOD PRESSURE: 92 MMHG | HEART RATE: 76 BPM | OXYGEN SATURATION: 100 % | BODY MASS INDEX: 34.3 KG/M2 | RESPIRATION RATE: 18 BRPM | WEIGHT: 245 LBS | HEIGHT: 71 IN | TEMPERATURE: 98.4 F

## 2021-05-19 DIAGNOSIS — G89.29 CHRONIC LEFT-SIDED LOW BACK PAIN WITH LEFT-SIDED SCIATICA: Primary | ICD-10-CM

## 2021-05-19 DIAGNOSIS — M54.42 CHRONIC LEFT-SIDED LOW BACK PAIN WITH LEFT-SIDED SCIATICA: Primary | ICD-10-CM

## 2021-05-19 PROCEDURE — 74011636637 HC RX REV CODE- 636/637: Performed by: PHYSICIAN ASSISTANT

## 2021-05-19 PROCEDURE — 99283 EMERGENCY DEPT VISIT LOW MDM: CPT

## 2021-05-19 PROCEDURE — 74011250637 HC RX REV CODE- 250/637: Performed by: PHYSICIAN ASSISTANT

## 2021-05-19 RX ORDER — METHYLPREDNISOLONE 4 MG/1
TABLET ORAL
Qty: 1 DOSE PACK | Refills: 0 | Status: SHIPPED | OUTPATIENT
Start: 2021-05-19 | End: 2021-09-28

## 2021-05-19 RX ORDER — LIDOCAINE 50 MG/G
1 PATCH TOPICAL EVERY 24 HOURS
Qty: 14 PATCH | Refills: 0 | Status: SHIPPED | OUTPATIENT
Start: 2021-05-19 | End: 2021-06-02

## 2021-05-19 RX ORDER — PREDNISONE 20 MG/1
20 TABLET ORAL
Status: COMPLETED | OUTPATIENT
Start: 2021-05-19 | End: 2021-05-19

## 2021-05-19 RX ORDER — OXYCODONE AND ACETAMINOPHEN 5; 325 MG/1; MG/1
1 TABLET ORAL
Status: COMPLETED | OUTPATIENT
Start: 2021-05-19 | End: 2021-05-19

## 2021-05-19 RX ORDER — NAPROXEN 500 MG/1
500 TABLET ORAL 2 TIMES DAILY WITH MEALS
Qty: 20 TABLET | Refills: 0 | Status: SHIPPED | OUTPATIENT
Start: 2021-05-19 | End: 2021-05-29

## 2021-05-19 RX ORDER — HYDROCODONE BITARTRATE AND ACETAMINOPHEN 5; 325 MG/1; MG/1
1 TABLET ORAL
Qty: 5 TABLET | Refills: 0 | Status: SHIPPED | OUTPATIENT
Start: 2021-05-19 | End: 2021-05-22

## 2021-05-19 RX ORDER — CYCLOBENZAPRINE HCL 5 MG
5 TABLET ORAL
Qty: 15 TABLET | Refills: 0 | Status: SHIPPED | OUTPATIENT
Start: 2021-05-19 | End: 2021-05-24

## 2021-05-19 RX ADMIN — OXYCODONE HYDROCHLORIDE AND ACETAMINOPHEN 1 TABLET: 5; 325 TABLET ORAL at 12:10

## 2021-05-19 RX ADMIN — PREDNISONE 20 MG: 20 TABLET ORAL at 12:10

## 2021-05-19 NOTE — ED PROVIDER NOTES
EMERGENCY DEPARTMENT HISTORY AND PHYSICAL EXAM      Date: 5/19/2021  Patient Name: Duc Laguna    History of Presenting Illness     Chief Complaint   Patient presents with    Back Pain    Leg Pain       History Provided By: Patient    HPI: Duc Laguna, 31 y.o. male with No significant past medical history presents to the ED with cc of intermittent left lower back pain radiating down left lower extremity since 2/2021 that has been significantly worsened and more constant over the last 1 week. Symptoms exacerbated with movement and with ambulation. Slightly alleviated with leftover Valium that he had at home but patient states it is not working very well. No other associated symptoms. No recent injury, trauma, fall. He does lift heavy boxes frequently while at work. Has never seen orthopedist. Patient denies any extremity numbness, tingling, weakness, urinary/fecal incontinence, urinary retention, difficulty urinating, saddle anesthesia, gait problems, fever, chills, chest pain, shortness of breath, nausea, vomiting, diarrhea. There are no other complaints, changes, or physical findings at this time. PCP: MATHIEU Waldrop    No current facility-administered medications on file prior to encounter. Current Outpatient Medications on File Prior to Encounter   Medication Sig Dispense Refill    [DISCONTINUED] diazePAM (Valium) 2 mg tablet Take 2.5 Tabs by mouth every eight (8) hours as needed for Muscle Spasm(s). Max Daily Amount: 15 mg. 20 Tab 0    [DISCONTINUED] naproxen (NAPROSYN) 500 mg tablet Take 1 Tab by mouth two (2) times daily (with meals).  30 Tab 0       Past History     Past Medical History:  Past Medical History:   Diagnosis Date    Kidney stone        Past Surgical History:  Past Surgical History:   Procedure Laterality Date    HX LITHOTRIPSY  01/20/2021    Extracorporeal shockwave lithotripsy    HX ORTHOPAEDIC Right     Thumb reattachment    HX SHOULDER ARTHROSCOPY Family History:  Family History   Problem Relation Age of Onset    No Known Problems Mother     No Known Problems Father        Social History:  Social History     Tobacco Use    Smoking status: Former Smoker     Years: 3.00    Smokeless tobacco: Never Used    Tobacco comment: pt states quit several years    Vaping Use    Vaping Use: Former    Substances: CBD    Devices: Disposable   Substance Use Topics    Alcohol use: Yes     Alcohol/week: 2.0 standard drinks     Types: 2 Cans of beer per week     Comment: per week per pt    Drug use: Not Currently     Types: Marijuana     Comment: pt states last used on 1/1/2021       Allergies:  No Known Allergies      Review of Systems     Review of Systems   Constitutional: Negative for chills, fatigue and fever. HENT: Negative. Respiratory: Negative for chest tightness and shortness of breath. Cardiovascular: Negative for chest pain. Gastrointestinal: Negative for abdominal pain, diarrhea, nausea and vomiting. Genitourinary: Negative for difficulty urinating, frequency and urgency. Denies urinary retention   Musculoskeletal: Positive for back pain. Skin: Negative for rash. Neurological: Negative for dizziness, weakness, light-headedness and headaches. Denies extremity numbness, tingling, weakness, saddle anesthesia, urinary/fecal incontinence   Psychiatric/Behavioral: Negative. All other systems reviewed and are negative. Physical Exam     Physical Exam  Vitals and nursing note reviewed. Constitutional:       General: He is not in acute distress. Appearance: Normal appearance. He is not ill-appearing or toxic-appearing. HENT:      Head: Normocephalic and atraumatic. Nose: Nose normal.      Mouth/Throat:      Mouth: Mucous membranes are moist.   Eyes:      General: Lids are normal.      Conjunctiva/sclera:      Right eye: Right conjunctiva is not injected. Left eye: Left conjunctiva is not injected. Cardiovascular:      Rate and Rhythm: Normal rate and regular rhythm. Heart sounds: No murmur heard. No friction rub. No gallop. Pulmonary:      Effort: Pulmonary effort is normal. No tachypnea or respiratory distress. Breath sounds: Normal breath sounds. No stridor or decreased air movement. Musculoskeletal:         General: Tenderness present. No swelling, deformity or signs of injury. Normal range of motion. Cervical back: Normal range of motion and neck supple. Right lower leg: No edema. Left lower leg: No edema. Comments: Back: No midline tenderness, no step off. +L lumbar paraspinal tenderness. + SLR on the left  Sensation intact distally. Strength 5/5 in BL lower extremities   Skin:     General: Skin is warm. Capillary Refill: Capillary refill takes less than 2 seconds. Neurological:      General: No focal deficit present. Mental Status: He is alert and oriented to person, place, and time. Mental status is at baseline. Psychiatric:         Mood and Affect: Mood normal.         Behavior: Behavior is cooperative. Thought Content: Thought content normal.         Judgment: Judgment normal.         Lab and Diagnostic Study Results     Labs -   No results found for this or any previous visit (from the past 12 hour(s)). Radiologic Studies - none    Medical Decision Making   - I am the first provider for this patient. - I reviewed the vital signs, available nursing notes, past medical history, past surgical history, family history and social history. - Initial assessment performed. The patients presenting problems have been discussed, and they are in agreement with the care plan formulated and outlined with them. I have encouraged them to ask questions as they arise throughout their visit. Vital Signs-Reviewed the patient's vital signs.   Patient Vitals for the past 12 hrs:   Temp Pulse Resp BP SpO2   05/19/21 1140 98.4 °F (36.9 °C) 76 18 (!) 156/98 100 %       Records Reviewed: Nursing Notes and Old Medical Records    The patient presents with lower back pain with a differential diagnosis of sciatica, herniated disc, lumbar strain      ED Course:          Provider Notes (Medical Decision Making):     MDM  Number of Diagnoses or Management Options  Chronic left-sided low back pain with left-sided sciatica  Diagnosis management comments:     79-year-old male with lower back pain radiating down left lower extremity. He is neurovascularly intact on exam.  Examination consistent with sciatica. No recent injury or traumado not think imaging needed at this time. No alarm symptoms based on H&P. Discharged home with steroids, pain medication, muscle relaxer, NSAIDs. Referred to orthopedist for further evaluation and management since this has been chronic since February. Patient is ambulatory without foot drop. Return precautions discussed       Amount and/or Complexity of Data Reviewed  Review and summarize past medical records: yes    Patient Progress  Patient progress: stable           Disposition   Disposition: DC- Adult Discharges: All of the diagnostic tests were reviewed and questions answered. Diagnosis, care plan and treatment options were discussed. The patient understands the instructions and will follow up as directed. The patients results have been reviewed with them. They have been counseled regarding their diagnosis. The patient verbally convey understanding and agreement of the signs, symptoms, diagnosis, treatment and prognosis and additionally agrees to follow up as recommended with their PCP in 24 - 48 hours. They also agree with the care-plan and convey that all of their questions have been answered.   I have also put together some discharge instructions for them that include: 1) educational information regarding their diagnosis, 2) how to care for their diagnosis at home, as well a 3) list of reasons why they would want to return to the ED prior to their follow-up appointment, should their condition change. Discharged    DISCHARGE PLAN:  1. Current Discharge Medication List      CONTINUE these medications which have NOT CHANGED    Details   diazePAM (Valium) 2 mg tablet Take 2.5 Tabs by mouth every eight (8) hours as needed for Muscle Spasm(s). Max Daily Amount: 15 mg.  Qty: 20 Tab, Refills: 0    Associated Diagnoses: Lumbar paraspinal muscle spasm; Sciatica, unspecified laterality      naproxen (NAPROSYN) 500 mg tablet Take 1 Tab by mouth two (2) times daily (with meals). Qty: 30 Tab, Refills: 0           2. Follow-up Information     Follow up With Specialties Details Why Contact Info    Woo Frey MD Orthopedic Surgery Schedule an appointment as soon as possible for a visit   Eladio Cyr [address]  [phone]      [facility] EMERGENCY DEPT Emergency Medicine  As needed, If symptoms worsen [address] [facility] [address] [phone]        3. Return to ED if worse   4. Current Discharge Medication List      START taking these medications    Details   HYDROcodone-acetaminophen (NORCO) 5-325 mg per tablet Take 1 Tablet by mouth every six (6) hours as needed for Pain for up to 3 days. Max Daily Amount: 4 Tablets. Qty: 5 Tablet, Refills: 0  Start date: 5/19/2021, End date: 5/22/2021    Associated Diagnoses: Chronic left-sided low back pain with left-sided sciatica      cyclobenzaprine (FLEXERIL) 5 mg tablet Take 1 Tablet by mouth three (3) times daily as needed for Muscle Spasm(s) for up to 5 days. Qty: 15 Tablet, Refills: 0  Start date: 5/19/2021, End date: 5/24/2021      methylPREDNISolone (Medrol, Diego,) 4 mg tablet See instructions  Qty: 1 Dose Pack, Refills: 0  Start date: 5/19/2021      lidocaine (Lidoderm) 5 % 1 Patch by TransDERmal route every twenty-four (24) hours for 14 days. Apply patch to the affected area for 12 hours a day and remove for 12 hours a day.   Qty: 14 Patch, Refills: 0  Start date: 5/19/2021, End date: 6/2/2021      naproxen (Naprosyn) 500 mg tablet Take 1 Tablet by mouth two (2) times daily (with meals) for 10 days. Qty: 20 Tablet, Refills: 0  Start date: 5/19/2021, End date: 5/29/2021               Diagnosis     Clinical Impression:   1. Chronic left-sided low back pain with left-sided sciatica        Attestations:    ELIZABETH Toney    Please note that this dictation was completed with [employer], the [employer] voice recognition software. Quite often unanticipated grammatical, syntax, homophones, and other interpretive errors are inadvertently transcribed by the computer software. Please disregard these errors. Please excuse any errors that have escaped final proofreading. Thank you.

## 2021-06-21 ENCOUNTER — APPOINTMENT (OUTPATIENT)
Dept: CT IMAGING | Age: 36
End: 2021-06-21
Attending: NURSE PRACTITIONER
Payer: COMMERCIAL

## 2021-06-21 ENCOUNTER — HOSPITAL ENCOUNTER (EMERGENCY)
Age: 36
Discharge: HOME | End: 2021-06-21
Payer: COMMERCIAL

## 2021-06-21 VITALS
TEMPERATURE: 98.3 F | OXYGEN SATURATION: 99 % | RESPIRATION RATE: 16 BRPM | BODY MASS INDEX: 35 KG/M2 | SYSTOLIC BLOOD PRESSURE: 136 MMHG | HEART RATE: 72 BPM | DIASTOLIC BLOOD PRESSURE: 91 MMHG | HEIGHT: 71 IN | WEIGHT: 250 LBS

## 2021-06-21 DIAGNOSIS — M51.36 BULGING LUMBAR DISC: Primary | ICD-10-CM

## 2021-06-21 PROCEDURE — 99283 EMERGENCY DEPT VISIT LOW MDM: CPT

## 2021-06-21 PROCEDURE — 74011250637 HC RX REV CODE- 250/637: Performed by: NURSE PRACTITIONER

## 2021-06-21 PROCEDURE — 74011636637 HC RX REV CODE- 636/637: Performed by: NURSE PRACTITIONER

## 2021-06-21 PROCEDURE — 72131 CT LUMBAR SPINE W/O DYE: CPT

## 2021-06-21 RX ORDER — PREDNISONE 20 MG/1
TABLET ORAL
Qty: 18 TABLET | Refills: 0 | Status: SHIPPED | OUTPATIENT
Start: 2021-06-21 | End: 2021-09-28

## 2021-06-21 RX ORDER — PREDNISONE 20 MG/1
60 TABLET ORAL ONCE
Status: COMPLETED | OUTPATIENT
Start: 2021-06-21 | End: 2021-06-21

## 2021-06-21 RX ORDER — CYCLOBENZAPRINE HCL 10 MG
10 TABLET ORAL
Status: COMPLETED | OUTPATIENT
Start: 2021-06-21 | End: 2021-06-21

## 2021-06-21 RX ORDER — HYDROCODONE BITARTRATE AND ACETAMINOPHEN 5; 325 MG/1; MG/1
2 TABLET ORAL
Status: COMPLETED | OUTPATIENT
Start: 2021-06-21 | End: 2021-06-21

## 2021-06-21 RX ORDER — CYCLOBENZAPRINE HCL 10 MG
10 TABLET ORAL
Qty: 12 TABLET | Refills: 0 | Status: SHIPPED | OUTPATIENT
Start: 2021-06-21 | End: 2021-09-28

## 2021-06-21 RX ORDER — HYDROCODONE BITARTRATE AND ACETAMINOPHEN 5; 325 MG/1; MG/1
1 TABLET ORAL
Qty: 12 TABLET | Refills: 0 | Status: SHIPPED | OUTPATIENT
Start: 2021-06-21 | End: 2021-06-24

## 2021-06-21 RX ADMIN — CYCLOBENZAPRINE 10 MG: 10 TABLET, FILM COATED ORAL at 13:56

## 2021-06-21 RX ADMIN — PREDNISONE 60 MG: 20 TABLET ORAL at 13:56

## 2021-06-21 RX ADMIN — HYDROCODONE BITARTRATE AND ACETAMINOPHEN 2 TABLET: 5; 325 TABLET ORAL at 13:56

## 2021-06-21 NOTE — ED PROVIDER NOTES
EMERGENCY DEPARTMENT HISTORY AND PHYSICAL EXAM      Date: 6/21/2021  Patient Name: Zunilda Phillips    History of Presenting Illness     Chief Complaint   Patient presents with    Back Pain       History Provided By: Patient    HPI: Zunilda Phillips, 31 y.o. male with a past medical history significant obesity, osteoarthritis and back pain presents to the ED with cc of lumbar pain. Patient has a history of low back pain. Patient has pain radiating down his left leg. Patient has an MRI ordered but is pending due to insurance issues. Patient denies any saddle anesthesia or periods of incontinence. Moderate severity, no known exacerbating or relieving factors, no other associated signs and symptoms. Patient specifically denies fever, chills, chest pain, shortness of breath, abdominal pain, nausea, vomiting, diarrhea. There are no other complaints, changes, or physical findings at this time. PCP: MATHIEU Torres    No current facility-administered medications on file prior to encounter.      Current Outpatient Medications on File Prior to Encounter   Medication Sig Dispense Refill    methylPREDNISolone (Medrol, Diego,) 4 mg tablet See instructions 1 Dose Pack 0       Past History     Past Medical History:  Past Medical History:   Diagnosis Date    Kidney stone        Past Surgical History:  Past Surgical History:   Procedure Laterality Date    HX LITHOTRIPSY  01/20/2021    Extracorporeal shockwave lithotripsy    HX ORTHOPAEDIC Right     Thumb reattachment    HX SHOULDER ARTHROSCOPY         Family History:  Family History   Problem Relation Age of Onset    No Known Problems Mother     No Known Problems Father        Social History:  Social History     Tobacco Use    Smoking status: Former Smoker     Years: 3.00    Smokeless tobacco: Never Used    Tobacco comment: pt states quit several years    Vaping Use    Vaping Use: Former    Substances: CBD    Devices: Disposable   Substance Use Topics    Alcohol use: Yes     Alcohol/week: 2.0 standard drinks     Types: 2 Cans of beer per week     Comment: per week per pt    Drug use: Not Currently     Types: Marijuana     Comment: pt states last used on 1/1/2021       Allergies:  No Known Allergies      Review of Systems     Review of Systems   Constitutional: Negative for chills and fever. HENT: Negative for dental problem and sore throat. Eyes: Negative for pain and visual disturbance. Respiratory: Negative for cough and chest tightness. Cardiovascular: Negative for chest pain. Gastrointestinal: Negative for diarrhea and nausea. Genitourinary: Negative for difficulty urinating and frequency. Musculoskeletal: Positive for back pain and myalgias. Negative for gait problem and joint swelling. Neurological: Negative for numbness and headaches. Hematological: Negative for adenopathy. Does not bruise/bleed easily. Psychiatric/Behavioral: Negative for behavioral problems and suicidal ideas. Physical Exam     Physical Exam  Vitals and nursing note reviewed. Constitutional:       General: He is not in acute distress. Appearance: Normal appearance. He is not ill-appearing or toxic-appearing. HENT:      Head: Normocephalic and atraumatic. Nose: Nose normal.      Mouth/Throat:      Mouth: Mucous membranes are moist.   Eyes:      Extraocular Movements: Extraocular movements intact. Pupils: Pupils are equal, round, and reactive to light. Cardiovascular:      Rate and Rhythm: Normal rate and regular rhythm. Pulmonary:      Effort: Pulmonary effort is normal.      Breath sounds: Normal breath sounds. Abdominal:      General: Bowel sounds are normal.   Musculoskeletal:      Cervical back: Normal range of motion and neck supple. Lumbar back: Spasms, tenderness and bony tenderness present. Positive left straight leg raise test.   Skin:     General: Skin is warm and dry.       Capillary Refill: Capillary refill takes less than 2 seconds. Neurological:      General: No focal deficit present. Mental Status: He is alert and oriented to person, place, and time. Psychiatric:         Mood and Affect: Mood normal.         Behavior: Behavior normal.         Lab and Diagnostic Study Results     Labs -   No results found for this or any previous visit (from the past 12 hour(s)). Radiologic Studies -   @lastxrresult@  CT Results  (Last 48 hours)               06/21/21 1341  CT SPINE LUMB WO CONT Final result    Impression:  1. Disc bulge at L5-S1 as described. 2. No acute bony findings. 3. Bilateral nonobstructing renal calcifications. No hydronephrosis or   hydroureter. Narrative:  Axial images through the lumbar spine were obtained without contrast. Sagittal   and coronal reformatted images were reviewed. All CT scans at this facility are   performed using dose reduction optimization techniques as appropriate to a   performed exam including the following:   automated exposure control,   adjustments of the mA and/or kV according to patient size, or use of iterative   reconstruction technique. Comparison with January 19, 2021. INDICATION: Back pain. Bilateral nonobstructing renal calcifications are seen. There is normal alignment of the lumbar spine without evidence of fracture or   subluxation. There is a central and left paracentral disc bulge at L5-S1   displacing the descending left S1 nerve root. No other significant disc bulges. No paraspinal masses. Normal appendix. CXR Results  (Last 48 hours)    None            Medical Decision Making   - I am the first provider for this patient. - I reviewed the vital signs, available nursing notes, past medical history, past surgical history, family history and social history. - Initial assessment performed. The patients presenting problems have been discussed, and they are in agreement with the care plan formulated and outlined with them.   I have encouraged them to ask questions as they arise throughout their visit. Vital Signs-Reviewed the patient's vital signs. Patient Vitals for the past 12 hrs:   Temp Pulse Resp BP SpO2   06/21/21 1403  72 16 (!) 136/91 99 %   06/21/21 1228 98.3 °F (36.8 °C) 76 18 (!) 143/92 98 %       Records Reviewed: Nursing Notes and Old Medical Records          ED Course:          Provider Notes (Medical Decision Making): The patient presents with acute low back pain. Stable vitals and benign exam. DDx: strain, sprain, sciatica, MSK pain. Clinical presentation not consistent with  pathology, aortic dissection or AAA. There is no urine/bowel incontinence or perianal numbess to suggest cauda equina. No fever/chills, IVDA to suggest epidural abscess or discitis. No focal weakness or sensory changes to suggest transverse myelitis. Therefore MRI not indicated. No risk of compression fracture (not in right age group or suffer from [address]) or trauma to warrant x-ray. I have recommended rest, avoiding heavy lifting until better, use of intermittent heat (avoid sleeping on a heating pad). Call PCP if back pain persists or he develops leg symptoms or other concerning red flags. Will provide pain control  MDM       Procedures   Medical Decision Makingedical Decision Making  Performed by: Waleska Padilla NP  PROCEDURES:  Procedures       Disposition   Disposition: DC- Adult Discharges: All of the diagnostic tests were reviewed and questions answered. Diagnosis, care plan and treatment options were discussed. The patient understands the instructions and will follow up as directed. The patients results have been reviewed with them. They have been counseled regarding their diagnosis. The patient verbally convey understanding and agreement of the signs, symptoms, diagnosis, treatment and prognosis and additionally agrees to follow up as recommended with their PCP in 24 - 48 hours.   They also agree with the care-plan and convey that all of their questions have been answered. I have also put together some discharge instructions for them that include: 1) educational information regarding their diagnosis, 2) how to care for their diagnosis at home, as well a 3) list of reasons why they would want to return to the ED prior to their follow-up appointment, should their condition change. Discharged    DISCHARGE PLAN:  1. Current Discharge Medication List      CONTINUE these medications which have NOT CHANGED    Details   methylPREDNISolone (Medrol, Diego,) 4 mg tablet See instructions  Qty: 1 Dose Pack, Refills: 0           2. Follow-up Information     Follow up With Specialties Details Why [address], [place], [address] Cardiology   [address] [id number]  [facility], [address] [facility], [address] [facility] Orthopedic Surgery   [address] [address]  [phone]          3. Return to ED if worse   4. Current Discharge Medication List      START taking these medications    Details   HYDROcodone-acetaminophen (Lorcet, HYDROcodone,) 5-325 mg per tablet Take 1 Tablet by mouth every six (6) hours as needed for Pain for up to 3 days. Max Daily Amount: 4 Tablets. Qty: 12 Tablet, Refills: 0  Start date: 6/21/2021, End date: 6/24/2021    Associated Diagnoses: Bulging lumbar disc      predniSONE (DELTASONE) 20 mg tablet 3 tabs for 3 days, 2 tabs for 3 days, 1 tab for 3 days  Qty: 18 Tablet, Refills: 0  Start date: 6/21/2021      cyclobenzaprine (FLEXERIL) 10 mg tablet Take 1 Tablet by mouth three (3) times daily as needed for Muscle Spasm(s). Qty: 12 Tablet, Refills: 0  Start date: 6/21/2021               Diagnosis     Clinical Impression:   1. Bulging lumbar disc        Attestations:    Vikram Cortes, NP    Please note that this dictation was completed with [employer], the [employer] voice recognition software.   Quite often unanticipated grammatical, syntax, homophones, and other interpretive errors are inadvertently transcribed by the computer software. Please disregard these errors. Please excuse any errors that have escaped final proofreading. Thank you.

## 2021-06-21 NOTE — Clinical Note
[address] [place] [address] EMERGENCY DEPT  [place] [address]  [phone]    Work/School Note    Date: 6/21/2021    To Whom It May concern:    Ruben Samuels was seen and treated today in the emergency room by the following provider(s):  Nurse Practitioner: Jose Francisco Soria NP. Ruben Samuels is excused from work/school on 6/21/2021 through 6/24/2021. He is medically clear to return to work/school on 6/25/2021.         Sincerely,          Jayne Lopez NP

## 2021-06-21 NOTE — ED TRIAGE NOTES
Reports lower back pain radiating down left leg. Reports has MRI ordered but has spoken with them multiple times and it is \"pending with insurance\" was told to come back to the ER.

## 2021-07-18 ENCOUNTER — HOSPITAL ENCOUNTER (EMERGENCY)
Age: 36
Discharge: HOME | End: 2021-07-18
Payer: COMMERCIAL

## 2021-07-18 ENCOUNTER — APPOINTMENT (OUTPATIENT)
Dept: GENERAL RADIOLOGY | Age: 36
End: 2021-07-18
Attending: NURSE PRACTITIONER
Payer: COMMERCIAL

## 2021-07-18 VITALS
BODY MASS INDEX: 33.6 KG/M2 | RESPIRATION RATE: 18 BRPM | HEART RATE: 90 BPM | TEMPERATURE: 100 F | SYSTOLIC BLOOD PRESSURE: 133 MMHG | DIASTOLIC BLOOD PRESSURE: 84 MMHG | WEIGHT: 240 LBS | HEIGHT: 71 IN | OXYGEN SATURATION: 98 %

## 2021-07-18 DIAGNOSIS — B34.9 VIRAL SYNDROME: ICD-10-CM

## 2021-07-18 DIAGNOSIS — R50.9 FEVER, UNSPECIFIED FEVER CAUSE: Primary | ICD-10-CM

## 2021-07-18 DIAGNOSIS — J01.10 ACUTE FRONTAL SINUSITIS, RECURRENCE NOT SPECIFIED: ICD-10-CM

## 2021-07-18 LAB
ALBUMIN SERPL-MCNC: 3.7 G/DL (ref 3.5–5)
ALBUMIN/GLOB SERPL: 0.9 {RATIO} (ref 1.1–2.2)
ALP SERPL-CCNC: 94 U/L (ref 45–117)
ALT SERPL-CCNC: 81 U/L (ref 12–78)
ANION GAP SERPL CALC-SCNC: 5 MMOL/L (ref 5–15)
APPEARANCE UR: CLEAR
AST SERPL W P-5'-P-CCNC: 48 U/L (ref 15–37)
BACTERIA URNS QL MICRO: NEGATIVE /HPF
BACTERIA URNS QL MICRO: NEGATIVE /HPF
BASOPHILS # BLD: 0 K/UL (ref 0–0.1)
BASOPHILS NFR BLD: 0 % (ref 0–1)
BILIRUB SERPL-MCNC: 0.3 MG/DL (ref 0.2–1)
BILIRUB UR QL: NEGATIVE
BUN SERPL-MCNC: 11 MG/DL (ref 6–20)
BUN/CREAT SERPL: 9 (ref 12–20)
CA-I BLD-MCNC: 9.8 MG/DL (ref 8.5–10.1)
CHLORIDE SERPL-SCNC: 104 MMOL/L (ref 97–108)
CO2 SERPL-SCNC: 27 MMOL/L (ref 21–32)
COLOR UR: ABNORMAL
COVID-19 RAPID TEST, COVR: NOT DETECTED
CREAT SERPL-MCNC: 1.25 MG/DL (ref 0.7–1.3)
DIFFERENTIAL METHOD BLD: ABNORMAL
EOSINOPHIL # BLD: 0 K/UL (ref 0–0.4)
EOSINOPHIL NFR BLD: 0 % (ref 0–7)
ERYTHROCYTE [DISTWIDTH] IN BLOOD BY AUTOMATED COUNT: 14.7 % (ref 11.5–14.5)
FLUAV AG NPH QL IA: NEGATIVE
FLUBV AG NOSE QL IA: NEGATIVE
GLOBULIN SER CALC-MCNC: 4.3 G/DL (ref 2–4)
GLUCOSE SERPL-MCNC: 106 MG/DL (ref 65–100)
GLUCOSE UR STRIP.AUTO-MCNC: NEGATIVE MG/DL
HCT VFR BLD AUTO: 43.7 % (ref 36.6–50.3)
HGB BLD-MCNC: 14.7 G/DL (ref 12.1–17)
HGB UR QL STRIP: NEGATIVE
IMM GRANULOCYTES # BLD AUTO: 0 K/UL (ref 0–0.04)
IMM GRANULOCYTES NFR BLD AUTO: 1 % (ref 0–0.5)
KETONES UR QL STRIP.AUTO: NEGATIVE MG/DL
LACTATE SERPL-SCNC: 1.2 MMOL/L (ref 0.4–2)
LEUKOCYTE ESTERASE UR QL STRIP.AUTO: NEGATIVE
LYMPHOCYTES # BLD: 1.1 K/UL (ref 0.8–3.5)
LYMPHOCYTES NFR BLD: 19 % (ref 12–49)
MCH RBC QN AUTO: 30.4 PG (ref 26–34)
MCHC RBC AUTO-ENTMCNC: 33.6 G/DL (ref 30–36.5)
MCV RBC AUTO: 90.3 FL (ref 80–99)
MONOCYTES # BLD: 0.5 K/UL (ref 0–1)
MONOCYTES NFR BLD: 9 % (ref 5–13)
MUCOUS THREADS URNS QL MICRO: ABNORMAL /LPF
MUCOUS THREADS URNS QL MICRO: ABNORMAL /LPF
NEUTS SEG # BLD: 4 K/UL (ref 1.8–8)
NEUTS SEG NFR BLD: 71 % (ref 32–75)
NITRITE UR QL STRIP.AUTO: NEGATIVE
NRBC # BLD: 0 K/UL (ref 0–0.01)
NRBC BLD-RTO: 0 PER 100 WBC
PH UR STRIP: 6 [PH] (ref 5–8)
PLATELET # BLD AUTO: 358 K/UL (ref 150–400)
PMV BLD AUTO: 8.9 FL (ref 8.9–12.9)
POTASSIUM SERPL-SCNC: 3.9 MMOL/L (ref 3.5–5.1)
PROT SERPL-MCNC: 8 G/DL (ref 6.4–8.2)
PROT UR STRIP-MCNC: 100 MG/DL
RBC # BLD AUTO: 4.84 M/UL (ref 4.1–5.7)
RBC #/AREA URNS HPF: ABNORMAL /HPF (ref 0–5)
RBC #/AREA URNS HPF: ABNORMAL /HPF (ref 0–5)
SODIUM SERPL-SCNC: 136 MMOL/L (ref 136–145)
SP GR UR REFRACTOMETRY: 1.03 (ref 1–1.03)
SPECIMEN SOURCE: NORMAL
UROBILINOGEN UR QL STRIP.AUTO: 2 EU/DL (ref 0.1–1)
WBC # BLD AUTO: 5.6 K/UL (ref 4.1–11.1)
WBC URNS QL MICRO: ABNORMAL /HPF (ref 0–4)
WBC URNS QL MICRO: ABNORMAL /HPF (ref 0–4)

## 2021-07-18 PROCEDURE — 80053 COMPREHEN METABOLIC PANEL: CPT

## 2021-07-18 PROCEDURE — 83605 ASSAY OF LACTIC ACID: CPT

## 2021-07-18 PROCEDURE — 71046 X-RAY EXAM CHEST 2 VIEWS: CPT

## 2021-07-18 PROCEDURE — 96361 HYDRATE IV INFUSION ADD-ON: CPT

## 2021-07-18 PROCEDURE — 87040 BLOOD CULTURE FOR BACTERIA: CPT

## 2021-07-18 PROCEDURE — 99285 EMERGENCY DEPT VISIT HI MDM: CPT

## 2021-07-18 PROCEDURE — 87804 INFLUENZA ASSAY W/OPTIC: CPT

## 2021-07-18 PROCEDURE — 96360 HYDRATION IV INFUSION INIT: CPT

## 2021-07-18 PROCEDURE — 74011250636 HC RX REV CODE- 250/636: Performed by: NURSE PRACTITIONER

## 2021-07-18 PROCEDURE — 86617 LYME DISEASE ANTIBODY: CPT

## 2021-07-18 PROCEDURE — 36415 COLL VENOUS BLD VENIPUNCTURE: CPT

## 2021-07-18 PROCEDURE — 74011250637 HC RX REV CODE- 250/637: Performed by: NURSE PRACTITIONER

## 2021-07-18 PROCEDURE — 85025 COMPLETE CBC W/AUTO DIFF WBC: CPT

## 2021-07-18 PROCEDURE — 81001 URINALYSIS AUTO W/SCOPE: CPT

## 2021-07-18 PROCEDURE — 86757 RICKETTSIA ANTIBODY: CPT

## 2021-07-18 PROCEDURE — 87635 SARS-COV-2 COVID-19 AMP PRB: CPT

## 2021-07-18 RX ORDER — IBUPROFEN 800 MG/1
800 TABLET ORAL
Status: COMPLETED | OUTPATIENT
Start: 2021-07-18 | End: 2021-07-18

## 2021-07-18 RX ORDER — ACETAMINOPHEN 500 MG
1000 TABLET ORAL
Status: COMPLETED | OUTPATIENT
Start: 2021-07-18 | End: 2021-07-18

## 2021-07-18 RX ORDER — DOXYCYCLINE 100 MG/1
100 CAPSULE ORAL ONCE
Status: COMPLETED | OUTPATIENT
Start: 2021-07-18 | End: 2021-07-18

## 2021-07-18 RX ADMIN — SODIUM CHLORIDE 1000 ML: 9 INJECTION, SOLUTION INTRAVENOUS at 16:41

## 2021-07-18 RX ADMIN — DOXYCYCLINE HYCLATE 100 MG: 100 CAPSULE ORAL at 17:46

## 2021-07-18 RX ADMIN — ACETAMINOPHEN 1000 MG: 500 TABLET ORAL at 17:46

## 2021-07-18 RX ADMIN — IBUPROFEN 800 MG: 800 TABLET, FILM COATED ORAL at 16:42

## 2021-07-18 NOTE — ED PROVIDER NOTES
EMERGENCY DEPARTMENT HISTORY AND PHYSICAL EXAM      Date: 7/18/2021  Patient Name: Balbir Connors    History of Presenting Illness     Chief Complaint   Patient presents with    Abdominal Pain    Headache    Nausea    Hip Pain       History Provided By: Patient    HPI: Balbir Connors, 31 y.o. male with a past medical history significant kidney stones presents to the ED with cc of headache with nausea. Onset last pm. Bilateral hip pain. -known sick contacts. +tick bites      There are no other complaints, changes, or physical findings at this time. PCP: MATHIEU Wu    No current facility-administered medications on file prior to encounter. Current Outpatient Medications on File Prior to Encounter   Medication Sig Dispense Refill    predniSONE (DELTASONE) 20 mg tablet 3 tabs for 3 days, 2 tabs for 3 days, 1 tab for 3 days 18 Tablet 0    cyclobenzaprine (FLEXERIL) 10 mg tablet Take 1 Tablet by mouth three (3) times daily as needed for Muscle Spasm(s). 12 Tablet 0    methylPREDNISolone (Medrol, Diego,) 4 mg tablet See instructions 1 Dose Pack 0       Past History     Past Medical History:  Past Medical History:   Diagnosis Date    Kidney stone        Past Surgical History:  Past Surgical History:   Procedure Laterality Date    HX LITHOTRIPSY  01/20/2021    Extracorporeal shockwave lithotripsy    HX ORTHOPAEDIC Right     Thumb reattachment    HX SHOULDER ARTHROSCOPY         Family History:  Family History   Problem Relation Age of Onset    No Known Problems Mother     No Known Problems Father        Social History:  Social History     Tobacco Use    Smoking status: Former Smoker     Years: 3.00    Smokeless tobacco: Never Used    Tobacco comment: pt states quit several years    Vaping Use    Vaping Use: Former    Substances: CBD    Devices: Disposable   Substance Use Topics    Alcohol use:  Yes     Alcohol/week: 2.0 standard drinks     Types: 2 Cans of beer per week     Comment: per week per pt    Drug use: Not Currently     Types: Marijuana     Comment: pt states last used on 1/1/2021       Allergies:  No Known Allergies      Review of Systems     Review of Systems   Constitutional: Positive for fatigue and fever. HENT: Negative. Eyes: Negative. Respiratory: Negative. Negative for cough. Cardiovascular: Negative. Gastrointestinal: Positive for nausea. Negative for vomiting. Endocrine: Negative. Genitourinary: Negative. Musculoskeletal: Positive for arthralgias and myalgias. Allergic/Immunologic: Negative. Neurological: Positive for headaches. Hematological: Negative. Psychiatric/Behavioral: Negative. All other systems reviewed and are negative. Physical Exam     Physical Exam  Vitals and nursing note reviewed. Constitutional:       Appearance: Normal appearance. He is normal weight. HENT:      Head: Normocephalic and atraumatic. Eyes:      Extraocular Movements: Extraocular movements intact. Pupils: Pupils are equal, round, and reactive to light. Cardiovascular:      Rate and Rhythm: Normal rate and regular rhythm. Pulses: Normal pulses. Heart sounds: Normal heart sounds. Pulmonary:      Effort: Pulmonary effort is normal.      Breath sounds: Normal breath sounds. Abdominal:      General: Abdomen is flat. Bowel sounds are normal.      Palpations: Abdomen is rigid. Tenderness: There is no abdominal tenderness. Musculoskeletal:         General: Normal range of motion. Skin:     General: Skin is warm and dry. Capillary Refill: Capillary refill takes less than 2 seconds. Neurological:      General: No focal deficit present. Mental Status: He is alert and oriented to person, place, and time. GCS: GCS eye subscore is 4. GCS verbal subscore is 5. GCS motor subscore is 6. Cranial Nerves: Cranial nerves are intact. Sensory: Sensation is intact. Motor: Motor function is intact.       Coordination: Coordination is intact. Gait: Gait is intact. Psychiatric:         Mood and Affect: Mood normal.         Behavior: Behavior normal.         Lab and Diagnostic Study Results     Labs -     No results found for this or any previous visit (from the past 12 hour(s)). Radiologic Studies -   @lastxrresult@  CT Results  (Last 48 hours)    None        CXR Results  (Last 48 hours)    None            Medical Decision Making   - I am the first provider for this patient. - I reviewed the vital signs, available nursing notes, past medical history, past surgical history, family history and social history. - Initial assessment performed. The patients presenting problems have been discussed, and they are in agreement with the care plan formulated and outlined with them. I have encouraged them to ask questions as they arise throughout their visit. Vital Signs-Reviewed the patient's vital signs. No data found. Records Reviewed: Nursing Notes    The patient presents with headache with a differential diagnosis of lymes disease, rmsf disease, viral syndrome,       ED Course:          Provider Notes (Medical Decision Making):     MDM  Number of Diagnoses or Management Options     Reviewed labs. Will add lymes and rmsf due to sx. Procedures   Medical Decision Makingedical Decision Making  Performed by: Teri Matamoros NP  PROCEDURES:  Procedures       Disposition   Disposition: Condition improved  DC- Adult Discharges: All of the diagnostic tests were reviewed and questions answered. Diagnosis, care plan and treatment options were discussed. The patient understands the instructions and will follow up as directed. The patients results have been reviewed with them. They have been counseled regarding their diagnosis.   The patient verbally convey understanding and agreement of the signs, symptoms, diagnosis, treatment and prognosis and additionally agrees to follow up as recommended with their PCP in 24 - 48 hours.  They also agree with the care-plan and convey that all of their questions have been answered. I have also put together some discharge instructions for them that include: 1) educational information regarding their diagnosis, 2) how to care for their diagnosis at home, as well a 3) list of reasons why they would want to return to the ED prior to their follow-up appointment, should their condition change. DC-The patient was given verbal follow-up instructions    Discharged    DISCHARGE PLAN:  1. Current Discharge Medication List      CONTINUE these medications which have NOT CHANGED    Details   predniSONE (DELTASONE) 20 mg tablet 3 tabs for 3 days, 2 tabs for 3 days, 1 tab for 3 days  Qty: 18 Tablet, Refills: 0      cyclobenzaprine (FLEXERIL) 10 mg tablet Take 1 Tablet by mouth three (3) times daily as needed for Muscle Spasm(s). Qty: 12 Tablet, Refills: 0      methylPREDNISolone (Medrol, Diego,) 4 mg tablet See instructions  Qty: 1 Dose Pack, Refills: 0           2. Follow-up Information     Follow up With Specialties Details Why Contact Info    [address] Cardiology In 3 days  [address] [facility]  [phone]          3. Return to ED if worse   4. Discharge Medication List as of 7/18/2021  6:25 PM            Diagnosis     Clinical Impression:   1. Fever, unspecified fever cause    2. Viral syndrome    3. Acute frontal sinusitis, recurrence not specified        Attestations:    Toby Blackman NP    Please note that this dictation was completed with [employer], the computer voice recognition software. Quite often unanticipated grammatical, syntax, homophones, and other interpretive errors are inadvertently transcribed by the computer software. Please disregard these errors. Please excuse any errors that have escaped final proofreading. Thank you.

## 2021-07-18 NOTE — LETTER
[facility] EMERGENCY DEPT  [address] [address]  [phone]    Work/School Note    Date: 7/18/2021    To Whom It May concern:    Hayde Leon was seen and treated today in the emergency room by the following provider(s):  Nurse Practitioner: Dean Oneil NP. Hayde Leon is excused from work/school on 7/18/2021 through 7/21/2021. He is medically clear to return to work/school on 7/22/2021.         Sincerely,          Melissa Oneal NP

## 2021-07-21 LAB — R RICKETTSI IGG SER QL IA: NEGATIVE

## 2021-07-25 LAB
B BURGDOR IGG PATRN SER IB-IMP: NEGATIVE
B BURGDOR IGM PATRN SER IB-IMP: NEGATIVE
B BURGDOR18KD IGG SER QL IB: ABNORMAL
B BURGDOR23KD IGG SER QL IB: ABNORMAL
B BURGDOR23KD IGM SER QL IB: ABNORMAL
B BURGDOR28KD IGG SER QL IB: PRESENT
B BURGDOR30KD IGG SER QL IB: ABNORMAL
B BURGDOR39KD IGG SER QL IB: ABNORMAL
B BURGDOR39KD IGM SER QL IB: ABNORMAL
B BURGDOR41KD IGG SER QL IB: ABNORMAL
B BURGDOR41KD IGM SER QL IB: ABNORMAL
B BURGDOR45KD IGG SER QL IB: ABNORMAL
B BURGDOR58KD IGG SER QL IB: PRESENT
B BURGDOR66KD IGG SER QL IB: ABNORMAL
B BURGDOR93KD IGG SER QL IB: ABNORMAL
BACTERIA SPEC CULT: NORMAL
SPECIAL REQUESTS,SREQ: NORMAL

## 2021-08-06 ENCOUNTER — HOSPITAL ENCOUNTER (OUTPATIENT)
Dept: PHYSICAL THERAPY | Age: 36
Discharge: HOME | End: 2021-08-06
Payer: COMMERCIAL

## 2021-08-06 PROCEDURE — 97161 PT EVAL LOW COMPLEX 20 MIN: CPT

## 2021-08-06 NOTE — PROGRESS NOTES
PT INITIAL EVALUATION NOTE 2-15    Patient Name: Wang Gallegos  Date:2021  : 1985  [x]  Patient  Verified  Payor: Dean [address] / Plan: [address] / Product Type: Managed Care Medicaid /    In time: 8:16  Out time: 8:57  Total Treatment Time (min): 41  Visit #: 1     Treatment Area: Sciatica, left side [M54.32]    SUBJECTIVE  Pain Level (0-10 scale): 10/10 (stabbing); worst: 10/10  Any medication changes, allergies to medications, adverse drug reactions, diagnosis change, or new procedure performed?: [] No    [x] Yes (see summary sheet for update)  Subjective: Pt reports he started having low back pain a couple of months ago at work. Pt denies any specific event causing the pain. Pt reports he had to take off of work for about 4 days, but has since returned to work and continues to have pain. Pt reports he is unable to sleep at night due to pain. Pt reports his back pain is localized to the left side. Pt denies numbness and tingling in LEs, but does have pain in the L LE which stops at his hip. Pt reports he has an MRI scheduled for 21. Pt reports that in 2019 he had an episode of LBP after picking up a heavy box at work but the symptoms resolved after approximately 2 days.      PLOF: Independent with all ADL's; no use of AD  Mechanism of Injury: Insidious onset  Previous Treatment/Compliance: yes  Radiographs: x-ray, MRI is scheduled  What increases symptoms: prolonged standing, prolonged sitting, walking  What decreases symptoms: none  Work Hx:  for [employer] Situation: one story home; 3 [address]  Pt Goals: \"to hopefully stop the pain if possible\"  Barriers: none  Motivation: Good  Substance use: Social drinker  Cognition: A&O x 4  Fall Assessment: TUG Test: 10 seconds  Past Medical History:  Past Medical History:   Diagnosis Date    Kidney stone      Past Surgical History:  Past Surgical History:   Procedure Laterality Date    HX LITHOTRIPSY  2021 Extracorporeal shockwave lithotripsy    HX ORTHOPAEDIC Right     Thumb reattachment    HX SHOULDER ARTHROSCOPY       Current Medications:  Current Outpatient Medications   Medication Instructions    cyclobenzaprine (FLEXERIL) 10 mg, Oral, 3 TIMES DAILY AS NEEDED    methylPREDNISolone (Medrol, Diego,) 4 mg tablet See instructions    predniSONE (DELTASONE) 20 mg tablet 3 tabs for 3 days, 2 tabs for 3 days, 1 tab for 3 days          OBJECTIVE/EXAMINATION  Posture:  Kyphotic - rounded shoulders  Palpation: TTP and increased muscular tension of QL and paraspinals L>R  L ASIS depressed; R innominate posteriorly rotated        Lumbar AROM:      Flexion            67  Degrees  p! Extension           26 degrees                      R                    L  Side Bending         100%      100%   Rotation         100%      100% p! Manual Muscle Testing R        L  Hip Flexion                    5/5                   4+/5 p! Hip Abduction   5/5  4+/5 p! Hip Adduction   5/5  5/5  Knee Extension            5/5                   4+/5 p! Knee Flexion                5/5  4/5  Ankle PF   5/5  5/5  Ankle DF   5/5  4+/5    Flexibility: Increased piriformis tightness      Special Tests: FABERS: Positive L>R    Long Sit: Positive; L LE length >R     SI Compression/Distraction: Negative     SLR: Negative R; Negative L (p!  In hip)    Sacral Springing: Positive    With   [x] TE   [] TA   [] neuro   [] other: Patient Education: [x] Provided HEP    [] Progressed/Changed HEP based on:   [] positioning   [] body mechanics   [] transfers   [] heat/ice application    [] other:        Pain Level (0-10 scale) post treatment: 7/10      ASSESSMENT:    [x]  See Plan of [address] Lynn PT, DPT 8/6/2021

## 2021-08-06 NOTE — PROGRESS NOTES
[address]  [place], [place]  Ph: [phone]    Fax: [phone]    Plan of Care/Statement of Necessity for Physical Therapy Services  2-15    Patient name: Wang Gallegos  : 1985  Provider#: [id number]  Referral source: Terry Beck MD      Medical/Treatment Diagnosis: Sciatica, left side [M54.32]     Prior Hospitalization: see medical history     Comorbidities: see medical history  Prior Level of Function: Independent with all ADL's; no use of AD  Medications: Verified on Patient Summary List    Start of Care: 2021      Onset Date: 2 months ago       The Plan of Care and following information is based on the information from the initial evaluation. Assessment/ key information: Pt is a pleasant 31 y.o. male presenting to physical therapy with signs and symptoms consistent with mechanical low back pain, piriformis tightness L>R, and SIJ dysfunction. Pt demonstrates increased pain, decreased lumbar AROM, decreased LE strength, decreased flexibility, as well as decreased functional mobility. Pt would benefit from skilled physical therapy to improve pain with the use of modalities, improve flexibility, AROM, and LE strength with therapeutic exercises, and improve functional mobility. Pt was instructed in HEP with 1:1 on supervision.      Evaluation Complexity History LOW Complexity : Zero comorbidities / personal factors that will impact the outcome / POC; Examination HIGH Complexity : 4+ Standardized tests and measures addressing body structure, function, activity limitation and / or participation in recreation  ;Presentation LOW Complexity : Stable, uncomplicated  ;Clinical Decision Making TUG Score: 10 seconds  Overall Complexity Rating: LOW     Problem List: pain affecting function, decrease ROM, decrease strength, decrease ADL/ functional abilitiies, decrease activity tolerance, decrease flexibility/ joint mobility and decrease transfer abilities   Treatment Plan may include any combination of the following: Therapeutic exercise, Therapeutic activities, Neuromuscular re-education, Physical agent/modality, Manual therapy, Patient education and Functional mobility training  Patient / Family readiness to learn indicated by: trying to perform skills  Persons(s) to be included in education: patient (P)  Barriers to Learning/Limitations: None  Patient Goal (s): to hopefully stop the pain if possible  Patient Self Reported Health Status: good  Rehabilitation Potential: good    Short Term Goals: To be accomplished in 5 treatments. 1. Pt will be independent and compliant with HEP to facilitate functional mobility. 2. Pt will be able to tolerate sitting for at least 20 minutes with pain no greater than 5/10 to facilitate performance of work-related duties. 3. Pt will be able to tolerate standing for at least 20 minutes with pain no greater than 5/10 to facilitate performance of ADLs. Long Term Goals: To be accomplished in 10 treatments. 1. Pt will be able to demonstrate a negative FABERs test bilaterally to facilitate improved flexibility with ambulation. 2. Pt will be able to increase L LE strength to match R to facilitate functional mobility. 3. Pt will be able to tolerate sitting for at least 30 minutes with pain no greater than 3/10 to facilitate performance of work-related duties. 4. Pt will be able to tolerate standing for at least 30 minutes with pain no greater than 3/10 to facilitate performance of ADLs. Frequency / Duration: Patient to be seen 2 times per week for 10 treatments. Patient/ Caregiver education and instruction: exercises    [x]  Plan of care has been reviewed with SINGH Valverde PT, DPT 8/6/2021     ________________________________________________________________________    I certify that the above Therapy Services are being furnished while the patient is under my care.  I agree with the treatment plan and certify that this therapy is necessary.     Physician's Signature:____________________  Date:____________Time: _________    Patient name: Narda Cates  : 1985  Provider#: [id number]

## 2021-08-26 ENCOUNTER — HOSPITAL ENCOUNTER (OUTPATIENT)
Dept: PHYSICAL THERAPY | Age: 36
Discharge: HOME | End: 2021-08-26
Payer: COMMERCIAL

## 2021-08-26 PROCEDURE — 97110 THERAPEUTIC EXERCISES: CPT

## 2021-08-26 NOTE — PROGRESS NOTES
PT DAILY TREATMENT NOTE 2-15    Patient Name: Toshia Plasencia  Date:2021  : 1985  [x]  Patient  Verified  Payor: [address] [place], [place] [address] / Plan: [address] / Product Type: Managed Care Medicaid /    In time: 2:03  Out time:2:55  Total Treatment Time (min): 52  Visit #: 2    Treatment Area: Sciatica, left side [M54.32]    SUBJECTIVE  Pain Level (0-10 scale): 10  Any medication changes, allergies to medications, adverse drug reactions, diagnosis change, or new procedure performed?: [x] No    [] Yes (see summary sheet for update)  Subjective functional status/changes:   [] No changes reported  Pt reports most of his pain is in his L hip. OBJECTIVE    52 min Therapeutic Exercise:  [x] See flow sheet :   Rationale: increase ROM and increase strength to improve the patients ability to perform work-related duties with reduced pain. With   [x] TE   [] TA   [] neuro   [] other: Patient Education: [x] Review HEP    [] Progressed/Changed HEP based on:   [] positioning   [] body mechanics   [] transfers   [] heat/ice application    [] other:      Pain Level (0-10 scale) post treatment: 4/10    ASSESSMENT/Changes in Function:   Session initiated with assessment of SIJ alignment and upon completion of MET shotgun pt reported a decrease in hip symptoms. Pt performed therapeutic exercise as indicated on the flow sheet with focus of skilled PT session on continued progression of core stability and hip strengthening with added emphasis placed on decreasing L piriformis and TFL tightness. Manual trigger point release was performed to L piriformis and pt noted improvement in symptoms upon completion. Patient tolerated treatment well as noted by his reported reduction of symptoms upon completion of session.  Patient will continue to benefit from skilled PT services to modify and progress therapeutic interventions, address functional mobility deficits, address ROM deficits, address strength deficits, analyze and address soft tissue restrictions, analyze and cue movement patterns and analyze and modify body mechanics/ergonomics to attain remaining goals. [x]  See Plan of Care  []  See progress note/recertification  []  See Discharge Summary         Progress towards goals / Updated goals:  Short Term Goals: To be accomplished in 5 treatments. 1. Pt will be independent and compliant with HEP to facilitate functional mobility. Progressing  2. Pt will be able to tolerate sitting for at least 20 minutes with pain no greater than 5/10 to facilitate performance of work-related duties. Progressing  3. Pt will be able to tolerate standing for at least 20 minutes with pain no greater than 5/10 to facilitate performance of ADLs. Progressing     Long Term Goals: To be accomplished in 10 treatments. 1. Pt will be able to demonstrate a negative FABERs test bilaterally to facilitate improved flexibility with ambulation. 2. Pt will be able to increase L LE strength to match R to facilitate functional mobility. 3. Pt will be able to tolerate sitting for at least 30 minutes with pain no greater than 3/10 to facilitate performance of work-related duties. 4. Pt will be able to tolerate standing for at least 30 minutes with pain no greater than 3/10 to facilitate performance of ADLs.     PLAN  [x]  Upgrade activities as tolerated     [x]  Continue plan of care  []  Update interventions per flow sheet       []  Discharge due to:_  []  Other:_      Kash Kennedy, PT, DPT 8/26/2021

## 2021-08-30 ENCOUNTER — HOSPITAL ENCOUNTER (OUTPATIENT)
Dept: PHYSICAL THERAPY | Age: 36
Discharge: HOME | End: 2021-08-30
Payer: COMMERCIAL

## 2021-08-30 PROCEDURE — 97014 ELECTRIC STIMULATION THERAPY: CPT

## 2021-08-31 NOTE — PROGRESS NOTES
PT DAILY TREATMENT NOTE 2-15    Patient Name: Lesa Bright  Date:2021  : 1985  [x]  Patient  Verified  Payor: [address] / Plan: [address] / Product Type: Managed Care Medicaid /    In time:3:05 PM  Out time:3:25 PM  Total Treatment Time (min): 20  Visit #: 3    Treatment Area: Sciatica, left side [M54.32]    SUBJECTIVE  Pain Level (0-10 scale): 10  Any medication changes, allergies to medications, adverse drug reactions, diagnosis change, or new procedure performed?: [x] No    [] Yes (see summary sheet for update)  Subjective functional status/changes:   [] No changes reported     I have been up all night due to back pain. I could not lay down to sleep. I tried to do the exercises. Nothing helped. OBJECTIVE    Modality rationale: decrease pain to improve the patients ability to tolerate sitting and standing to perform work related activities.    Min Type Additional Details      20 [x] Estim: []Att   [x]Unatt    []TENS instruct                  []IFC  []Premod   []NMES                     []Other:  []w/US   []w/ice   [x]w/heat  Position:low back  Location:sitting       []  Traction: [] Cervical       []Lumbar                       [] Prone          []Supine                       []Intermittent   []Continuous Lbs:  [] before manual  [] after manual  []w/heat    []  Ultrasound: []Continuous   [] Pulsed                       at: []1MHz   []3MHz Location:  W/cm2:    [] Paraffin         Location:   []w/heat    []  Ice     []  Heat  []  Ice massage Position:  Location:    []  Laser  []  Other: Position:  Location:      []  Vasopneumatic Device Pressure:       [] lo [] med [] hi   Temperature:      [x] Skin assessment post-treatment:  [x]intact []redness- no adverse reaction    []redness  adverse reaction:                   With   [] TE   [] TA   [] Neuro   [] SC   [] other: Patient Education: [x] Review HEP    [] Progressed/Changed HEP based on:   [] positioning [] body mechanics   [] transfers   [] heat/ice application    [] other:            Pain Level (0-10 scale) post treatment: 9    ASSESSMENT/Changes in Function:   Patient had complaint of severe back pain today. He was not able to tolerate exercises due to pain. Modalities of ES with MH to low back applied to decrease pain in sitting position. Should pain remains severe. Recommend patient to return to MD. Patient will continue to benefit from skilled PT services to address functional mobility deficits to attain remaining goals. [x]  See Plan of Care  []  See progress note/recertification  []  See Discharge Summary         Progress towards goals / Updated goals:  Short Term Goals: To be accomplished in 5 treatments. 1. Pt will be independent and compliant with HEP to facilitate functional mobility. 2. Pt will be able to tolerate sitting for at least 20 minutes with pain no greater than 5/10 to facilitate performance of work-related duties. 3. Pt will be able to tolerate standing for at least 20 minutes with pain no greater than 5/10 to facilitate performance of ADLs.    Long Term Goals: To be accomplished in 10 treatments. 1. Pt will be able to demonstrate a negative FABERs test bilaterally to facilitate improved flexibility with ambulation. 2. Pt will be able to increase L LE strength to match R to facilitate functional mobility. 3. Pt will be able to tolerate sitting for at least 30 minutes with pain no greater than 3/10 to facilitate performance of work-related duties. 4. Pt will be able to tolerate standing for at least 30 minutes with pain no greater than 3/10 to facilitate performance of ADLs.     PLAN  []  Upgrade activities as tolerated     [x]  Continue plan of care  []  Update interventions per flow sheet       []  Discharge due to:_  []  Other:_      Luz Peck, PT 8/30/2021

## 2021-09-01 ENCOUNTER — HOSPITAL ENCOUNTER (OUTPATIENT)
Dept: PHYSICAL THERAPY | Age: 36
Discharge: HOME | End: 2021-09-01
Payer: COMMERCIAL

## 2021-09-01 PROCEDURE — 97110 THERAPEUTIC EXERCISES: CPT

## 2021-09-01 PROCEDURE — 97014 ELECTRIC STIMULATION THERAPY: CPT

## 2021-09-01 NOTE — PROGRESS NOTES
PT DAILY TREATMENT NOTE 2-15    Patient Name: Zigmund Gilford  Date:2021  : 1985  [x]  Patient  Verified  Payor: [address] / Plan: [address] / Product Type: Managed Care Medicaid /    In time: 1:08  Out time: 1:43  Total Treatment Time (min): 35  Visit #:  4    Treatment Area: Sciatica, left side [M54.32]    SUBJECTIVE  Pain Level (0-10 scale): 7/10  Any medication changes, allergies to medications, adverse drug reactions, diagnosis change, or new procedure performed?: [x] No    [] Yes (see summary sheet for update)  Subjective functional status/changes:   [] No changes reported  \"I feel better than the other day.  I need to leave early for a dr appt\"    OBJECTIVE    Modality rationale: decrease pain and increase tissue extensibility to improve the patients ability to be able to perform AROM   Min Type Additional Details      10 [x] Estim: []Att   [x]Unatt    []TENS instruct                  [x]IFC  []Premod   []NMES                    []Other:  []w/US      [x]w/ heat  []w/ ice  Position: seated  Location: L side low back       []  Traction: [] Cervical       []Lumbar                       [] Prone          []Supine                       []Intermittent   []Continuous Lbs:  [] before manual  [] after manual  [] w/ heat  [] Simultaneously performed with w/ Estim    []  Ultrasound: []Continuous   [] Pulsed                       at: []1MHz   []3MHz Location:  W/cm2:    [] Paraffin         Location:   []w/heat    []  Ice     []  Heat  []  Ice massage Position:  Location:    []  Laser  []  Other: Position:  Location:      []  Vasopneumatic Device Pressure:       [] lo [] med [] hi   [] w/ ice      Temperature:   [] Simultaneously performed with w/ Estim     [x] Skin assessment post-treatment:  [x]intact []redness- no adverse reaction    []redness - adverse reaction:       25 min Therapeutic Exercise:  [x] See flow sheet :   Rationale: increase ROM and increase strength to improve the patients ability to improve functional mobility         With   [x] TE   [] TA   [] neuro   [] other: Patient Education: [x] Review HEP    [] Progressed/Changed HEP based on:   [] positioning   [] body mechanics   [] transfers   [] heat/ice application    [] other:      Pain Level (0-10 scale) post treatment: 5/10    ASSESSMENT/Changes in Function: Patient tolerated treatment fairly well. Session shortened today due to pt having another appt. Able to perform some stretches today compared to last time when he wasn't able to perform any. Patient will continue to benefit from skilled PT services to address functional mobility deficits, address ROM deficits and address strength deficits to attain remaining goals. [x]  See Plan of Care  []  See progress note/recertification  []  See Discharge Summary         Progress towards goals / Updated goals:  Short Term Goals: To be accomplished in 5 treatments. 1. Pt will be independent and compliant with HEP to facilitate functional mobility. 2. Pt will be able to tolerate sitting for at least 20 minutes with pain no greater than 5/10 to facilitate performance of work-related duties. 3. Pt will be able to tolerate standing for at least 20 minutes with pain no greater than 5/10 to facilitate performance of ADLs.    Long Term Goals: To be accomplished in 10 treatments. 1. Pt will be able to demonstrate a negative FABERs test bilaterally to facilitate improved flexibility with ambulation. 2. Pt will be able to increase L LE strength to match R to facilitate functional mobility. 3. Pt will be able to tolerate sitting for at least 30 minutes with pain no greater than 3/10 to facilitate performance of work-related duties. 4. Pt will be able to tolerate standing for at least 30 minutes with pain no greater than 3/10 to facilitate performance of ADLs.     PLAN  [x]  Upgrade activities as tolerated     [x]  Continue plan of care  []  Update interventions per flow sheet       []  Discharge due to:_  []  Other:_      Job Henson PTA, EBONY 9/1/2021

## 2021-09-09 ENCOUNTER — HOSPITAL ENCOUNTER (OUTPATIENT)
Dept: PHYSICAL THERAPY | Age: 36
Discharge: HOME | End: 2021-09-09
Payer: COMMERCIAL

## 2021-09-28 ENCOUNTER — HOSPITAL ENCOUNTER (EMERGENCY)
Age: 36
Discharge: HOME | End: 2021-09-29
Attending: EMERGENCY MEDICINE
Payer: COMMERCIAL

## 2021-09-28 VITALS
TEMPERATURE: 98.3 F | DIASTOLIC BLOOD PRESSURE: 93 MMHG | HEIGHT: 71 IN | HEART RATE: 96 BPM | WEIGHT: 250 LBS | BODY MASS INDEX: 35 KG/M2 | OXYGEN SATURATION: 98 % | SYSTOLIC BLOOD PRESSURE: 128 MMHG | RESPIRATION RATE: 20 BRPM

## 2021-09-28 DIAGNOSIS — G89.29 CHRONIC MIDLINE LOW BACK PAIN WITH LEFT-SIDED SCIATICA: Primary | ICD-10-CM

## 2021-09-28 DIAGNOSIS — M54.42 CHRONIC MIDLINE LOW BACK PAIN WITH LEFT-SIDED SCIATICA: Primary | ICD-10-CM

## 2021-09-28 PROCEDURE — 96372 THER/PROPH/DIAG INJ SC/IM: CPT

## 2021-09-28 PROCEDURE — 99283 EMERGENCY DEPT VISIT LOW MDM: CPT

## 2021-09-28 PROCEDURE — 74011250637 HC RX REV CODE- 250/637: Performed by: EMERGENCY MEDICINE

## 2021-09-28 PROCEDURE — 74011250636 HC RX REV CODE- 250/636: Performed by: EMERGENCY MEDICINE

## 2021-09-28 RX ORDER — KETOROLAC TROMETHAMINE 30 MG/ML
60 INJECTION, SOLUTION INTRAMUSCULAR; INTRAVENOUS
Status: COMPLETED | OUTPATIENT
Start: 2021-09-28 | End: 2021-09-28

## 2021-09-28 RX ORDER — CYCLOBENZAPRINE HCL 10 MG
10 TABLET ORAL
Status: COMPLETED | OUTPATIENT
Start: 2021-09-28 | End: 2021-09-28

## 2021-09-28 RX ORDER — CYCLOBENZAPRINE HCL 10 MG
10 TABLET ORAL
Qty: 20 TABLET | Refills: 0 | Status: SHIPPED | OUTPATIENT
Start: 2021-09-28 | End: 2022-01-08

## 2021-09-28 RX ORDER — ACETAMINOPHEN AND CODEINE PHOSPHATE 300; 30 MG/1; MG/1
2 TABLET ORAL
Status: COMPLETED | OUTPATIENT
Start: 2021-09-28 | End: 2021-09-28

## 2021-09-28 RX ADMIN — CYCLOBENZAPRINE HYDROCHLORIDE 10 MG: 10 TABLET, FILM COATED ORAL at 23:56

## 2021-09-28 RX ADMIN — KETOROLAC TROMETHAMINE 60 MG: 30 INJECTION, SOLUTION INTRAMUSCULAR at 23:56

## 2021-09-28 RX ADMIN — ACETAMINOPHEN AND CODEINE PHOSPHATE 2 TABLET: 300; 30 TABLET ORAL at 23:55

## 2021-09-28 NOTE — LETTER
NOTIFICATION RETURN TO WORK / SCHOOL    9/29/2021 12:03 AM    Mr. Alee Salvador  [place] [phone]      To Whom It May Concern:    Alee Salvador is currently under the care of [facility] EMERGENCY DEPT. He will return to work on: 09/30/2021      If there are questions or concerns please have the patient contact our office.         Sincerely,      Dick Nj MD

## 2021-09-29 NOTE — ED TRIAGE NOTES
Lower back and left leg pain. Pt already has a MRI appt set up with primary provider for middle of October. Pt states that the pain is worse tonight.

## 2021-09-29 NOTE — ED PROVIDER NOTES
Patient presents with complaint of low back pain and pain radiating down left leg. He had a job injury several years ago. He denies recent injury and is scheduled for an MRI in October. Pain is worse with movement or palpation. No other complaints. Past Medical History:   Diagnosis Date    Kidney stone        Past Surgical History:   Procedure Laterality Date    HX LITHOTRIPSY  01/20/2021    Extracorporeal shockwave lithotripsy    HX ORTHOPAEDIC Right     Thumb reattachment    HX SHOULDER ARTHROSCOPY           Family History:   Problem Relation Age of Onset    No Known Problems Mother     No Known Problems Father        Social History     Socioeconomic History    Marital status:      Spouse name: Not on file    Number of children: Not on file    Years of education: Not on file    Highest education level: Not on file   Occupational History    Not on file   Tobacco Use    Smoking status: Former Smoker     Years: 3.00    Smokeless tobacco: Never Used    Tobacco comment: pt states quit several years    Vaping Use    Vaping Use: Former    Substances: CBD    Devices: Disposable   Substance and Sexual Activity    Alcohol use: Yes     Alcohol/week: 2.0 standard drinks     Types: 2 Cans of beer per week     Comment: per week per pt    Drug use: Not Currently     Types: Marijuana     Comment: pt states last used on 1/1/2021    Sexual activity: Not on file   Other Topics Concern    Not on file   Social History Narrative    Not on file     Social Determinants of Health     Financial Resource Strain:     Difficulty of Paying Living Expenses:    Food Insecurity:     Worried About Running Out of Food in the Last Year:     [address] in the Last Year:    Transportation Needs:     Lack of Transportation (Medical):      Lack of Transportation (Non-Medical):    Physical Activity:     Days of Exercise per Week:     Minutes of Exercise per Session:    Stress:     Feeling of Stress : Social Connections:     Frequency of Communication with Friends and Family:     Frequency of Social Gatherings with Friends and Family:     Attends Rastafarian Services:     Active Member of Clubs or Organizations:     Attends Club or Organization Meetings:     Marital Status:    Intimate Partner Violence:     Fear of Current or Ex-Partner:     Emotionally Abused:     Physically Abused:     Sexually Abused: ALLERGIES: Patient has no known allergies. Review of Systems   Constitutional: Negative. HENT: Negative. Eyes: Negative. Respiratory: Negative. Cardiovascular: Negative. Endocrine: Negative. Genitourinary: Negative. Musculoskeletal: Positive for back pain. LLE pain   Skin: Negative. Allergic/Immunologic: Negative. Neurological: Negative. Hematological: Negative. Psychiatric/Behavioral: Negative. Vitals:    09/28/21 2335   BP: (!) 128/93   Pulse: 96   Resp: 20   Temp: 98.3 °F (36.8 °C)   SpO2: 98%   Weight: 113.4 kg (250 lb)   Height: 5' 11\" (1.803 m)            Physical Exam  Vitals and nursing note reviewed. HENT:      Head: Normocephalic and atraumatic. Eyes:      Extraocular Movements: Extraocular movements intact. Pupils: Pupils are equal, round, and reactive to light. Cardiovascular:      Rate and Rhythm: Normal rate and regular rhythm. Pulses: Normal pulses. Heart sounds: Normal heart sounds. Pulmonary:      Effort: Pulmonary effort is normal.      Breath sounds: Normal breath sounds. Abdominal:      General: Abdomen is flat. Bowel sounds are normal.      Palpations: Abdomen is soft. Musculoskeletal:         General: Tenderness present. No swelling. Normal range of motion. Cervical back: Normal range of motion and neck supple. Comments: Moderate tenderness on palpation of lower back with pain on left leg raising. Skin:     General: Skin is warm and dry.    Neurological:      General: No focal deficit present. Mental Status: He is oriented to person, place, and time. Mental status is at baseline.    Psychiatric:         Mood and Affect: Mood normal.         Behavior: Behavior normal.          MDM       Procedures

## 2021-10-27 ENCOUNTER — HOSPITAL ENCOUNTER (EMERGENCY)
Age: 36
Discharge: HOME | End: 2021-10-27
Attending: EMERGENCY MEDICINE
Payer: COMMERCIAL

## 2021-10-27 VITALS
BODY MASS INDEX: 35 KG/M2 | DIASTOLIC BLOOD PRESSURE: 93 MMHG | HEART RATE: 83 BPM | TEMPERATURE: 98.2 F | OXYGEN SATURATION: 98 % | RESPIRATION RATE: 18 BRPM | WEIGHT: 250 LBS | HEIGHT: 71 IN | SYSTOLIC BLOOD PRESSURE: 172 MMHG

## 2021-10-27 DIAGNOSIS — M54.32 SCIATICA OF LEFT SIDE: Primary | ICD-10-CM

## 2021-10-27 DIAGNOSIS — M79.609 PAIN OF SOFT TISSUE OF EXTREMITY: ICD-10-CM

## 2021-10-27 PROCEDURE — 99283 EMERGENCY DEPT VISIT LOW MDM: CPT

## 2021-10-27 PROCEDURE — 74011250637 HC RX REV CODE- 250/637: Performed by: EMERGENCY MEDICINE

## 2021-10-27 PROCEDURE — 96372 THER/PROPH/DIAG INJ SC/IM: CPT

## 2021-10-27 PROCEDURE — 74011250636 HC RX REV CODE- 250/636: Performed by: EMERGENCY MEDICINE

## 2021-10-27 RX ORDER — DEXAMETHASONE SODIUM PHOSPHATE 4 MG/ML
9 INJECTION, SOLUTION INTRA-ARTICULAR; INTRALESIONAL; INTRAMUSCULAR; INTRAVENOUS; SOFT TISSUE
Status: COMPLETED | OUTPATIENT
Start: 2021-10-27 | End: 2021-10-27

## 2021-10-27 RX ORDER — DICLOFENAC SODIUM 75 MG/1
75 TABLET, DELAYED RELEASE ORAL 2 TIMES DAILY
Qty: 14 TABLET | Refills: 0 | Status: SHIPPED | OUTPATIENT
Start: 2021-10-27 | End: 2021-11-03

## 2021-10-27 RX ORDER — HYDROCODONE BITARTRATE AND ACETAMINOPHEN 5; 325 MG/1; MG/1
1 TABLET ORAL ONCE
Status: COMPLETED | OUTPATIENT
Start: 2021-10-27 | End: 2021-10-27

## 2021-10-27 RX ADMIN — DEXAMETHASONE SODIUM PHOSPHATE 9 MG: 4 INJECTION, SOLUTION INTRAMUSCULAR; INTRAVENOUS at 21:05

## 2021-10-27 RX ADMIN — HYDROCODONE BITARTRATE AND ACETAMINOPHEN 1 TABLET: 5; 325 TABLET ORAL at 21:05

## 2021-10-27 NOTE — ED TRIAGE NOTES
Patient having pain in left arm where growth was previously removed apprx 1 year ago.  Patient describes a \"knot\" to his left lateral thigh

## 2021-10-27 NOTE — LETTER
NOTIFICATION RETURN TO WORK / SCHOOL    10/27/2021 9:14 PM    Mr. Rashmi Robles  [facility] [phone]      To Whom It May Concern:    Rashmi Robles is currently under the care of [facility] EMERGENCY DEPT. He will return to work/school on: 10/29/2021    Rashmi Robles may return to work/school with the following restrictions: as tolerated    If there are questions or concerns please have the patient contact our office.         Sincerely,      Tom Aranda RN

## 2021-10-28 NOTE — ED PROVIDER NOTES
EMERGENCY DEPARTMENT HISTORY AND PHYSICAL EXAM  ?    Date: 10/27/2021  Patient Name: Bal Mims    History of Presenting Illness    Patient presents with:  Arm Pain  Leg Pain      History Provided By: Patient's Wife    HPI: Bal Mims, 31 y.o. male with a past medical history significant for sciatica presents to the ED with cc of left shoulder pain for 4 months and left lateral thigh pain for 4 months. He is under the care of neurosurgeon for sciatica. He underwent physical therapy which has not helped. Supposed to follow-up following completion of physical therapy. He has chronic low back pain that radiates down the lateral aspect of his left thigh. In addition he complains of left shoulder pain in the area of previous surgery to remove benign mass. Pain is worse with palpation. There are no other complaints, changes, or physical findings at this time. PCP: MATHIEU Hwang    Current Outpatient Medications:  diclofenac EC (VOLTAREN) 75 mg EC tablet, Take 1 Tablet by mouth two (2) times a day for 7 days. , Disp: 14 Tablet, Rfl: 0  cyclobenzaprine (FLEXERIL) 10 mg tablet, Take 1 Tablet by mouth three (3) times daily as needed for Muscle Spasm(s).  (Patient not taking: Reported on 10/27/2021), Disp: 20 Tablet, Rfl: 0        Past History    Past Medical History:  Past Medical History:  No date: Kidney stone    Past Surgical History:  Past Surgical History:  01/20/2021: HX LITHOTRIPSY     Comment:  Extracorporeal shockwave lithotripsy  No date: HX ORTHOPAEDIC; Right     Comment:  Thumb reattachment  No date: HX SHOULDER ARTHROSCOPY    Family History:  Review of patient's family history indicates:  Problem: No Known Problems     Relation: Mother         Age of Onset: (Not Specified)  Problem: No Known Problems     Relation: Father         Age of Onset: (Not Specified)      Social History:  Social History   Tobacco Use     Smoking status: Former Smoker       Years: 3.00     Smokeless tobacco: Never Used     Tobacco comment: pt states quit several years    Vaping Use     Vaping Use: Former       Substances: CBD       Devices: Disposable   Alcohol use: Yes     Alcohol/week: 2.0 standard drinks     Types: 2 Cans of beer per week     Comment: occasionally   Drug use: Not Currently     Types: Marijuana     Comment: pt states last used on 1/1/2021      Allergies:  No Known Allergies      Review of Systems  @[facility]@    [facility] Exam  @[email or web address]    Diagnostic Study Results    Labs -  No results found for this or any previous visit (from the past 12 hour(s)). Radiologic Studies -   No orders to display  CT Results  (Last 48 hours)   None     CXR Results  (Last 48 hours)   None       Medical Decision Making and ED Course  I am the first provider for this patient. I reviewed the vital signs, available nursing notes, past medical history, past surgical history, family history and social history. Vital Signs-Reviewed the patient's vital signs. Empty flowsheet group. Records Reviewed: Nursing Notes    Provider Notes (Medical Decision Making):   Patient presents with chronic pain. Will treat symptomatically. The patient presents with differential diagnosis of sciatica, neuropathy, soft tissue pain      ED Course:   Initial assessment performed. The patients presenting problems have been discussed, and they are in agreement with the care plan formulated and outlined with them. I have encouraged them to ask questions as they arise throughout their visit. Disposition      Discharged      DISCHARGE PLAN:  1. Cannot display discharge medications since this patient is not currently admitted. 2. Follow-up Information    None    3.   Return to ED if worse     Diagnosis    Clinical Impression: Sciatica of left side  (primary encounter diagnosis)  Pain of soft tissue of extremity        Mary Ho MD    Please note that this dictation was completed with [employer], the computer voice recognition software. Quite often unanticipated grammatical, syntax, homophones, and other interpretive errors are inadvertently transcribed by the computer software. Please disregard these errors. Please excuse any errors that have escaped final proofreading. Thank you. Past Medical History:   Diagnosis Date    Kidney stone        Past Surgical History:   Procedure Laterality Date    HX LITHOTRIPSY  01/20/2021    Extracorporeal shockwave lithotripsy    HX ORTHOPAEDIC Right     Thumb reattachment    HX SHOULDER ARTHROSCOPY           Family History:   Problem Relation Age of Onset    No Known Problems Mother     No Known Problems Father        Social History     Socioeconomic History    Marital status:      Spouse name: Not on file    Number of children: Not on file    Years of education: Not on file    Highest education level: Not on file   Occupational History    Not on file   Tobacco Use    Smoking status: Former Smoker     Years: 3.00    Smokeless tobacco: Never Used    Tobacco comment: pt states quit several years    Vaping Use    Vaping Use: Former    Substances: CBD    Devices: Disposable   Substance and Sexual Activity    Alcohol use: Yes     Alcohol/week: 2.0 standard drinks     Types: 2 Cans of beer per week     Comment: occasionally    Drug use: Not Currently     Types: Marijuana     Comment: pt states last used on 1/1/2021    Sexual activity: Not on file   Other Topics Concern    Not on file   Social History Narrative    Not on file     Social Determinants of Health     Financial Resource Strain:     Difficulty of Paying Living Expenses:    Food Insecurity:     Worried About Running Out of Food in the Last Year:     [address] in the Last Year:    Transportation Needs:     Lack of Transportation (Medical):      Lack of Transportation (Non-Medical):    Physical Activity:     Days of Exercise per Week:     Minutes of Exercise per Session:    Stress:  Feeling of Stress :    Social Connections:     Frequency of Communication with Friends and Family:     Frequency of Social Gatherings with Friends and Family:     Attends Restoration[employer] Services:     Active Member of Clubs or Organizations:     Attends Club or Organization Meetings:     Marital Status:    Intimate Partner Violence:     Fear of Current or Ex-Partner:     Emotionally Abused:     Physically Abused:     Sexually Abused: ALLERGIES: Patient has no known allergies. Review of Systems   Constitutional: Negative. HENT: Negative. Eyes: Negative. Respiratory: Negative. Cardiovascular: Negative. Genitourinary: Negative. Musculoskeletal:        Left shoulder pain and lower back pain. Skin: Negative. Hematological: Negative. Vitals:    10/27/21 1933 10/27/21 2116   BP: (!) 184/108 (!) 172/93   Pulse: 85 83   Resp: 18 18   Temp: 98.2 °F (36.8 °C)    SpO2: 98%    Weight: 113.4 kg (250 lb)    Height: 5' 11\" (1.803 m)             Physical Exam  Vitals and nursing note reviewed. Constitutional:       Appearance: Normal appearance. He is normal weight. HENT:      Head: Normocephalic and atraumatic. Eyes:      Extraocular Movements: Extraocular movements intact. Conjunctiva/sclera: Conjunctivae normal.      Pupils: Pupils are equal, round, and reactive to light. Cardiovascular:      Rate and Rhythm: Normal rate and regular rhythm. Pulses: Normal pulses. Heart sounds: Normal heart sounds. Pulmonary:      Effort: Pulmonary effort is normal.      Breath sounds: Normal breath sounds. Abdominal:      General: Abdomen is flat. Bowel sounds are normal.      Palpations: Abdomen is soft. Musculoskeletal:         General: Tenderness present. No swelling. Normal range of motion. Left shoulder: Tenderness present. No bony tenderness. Arms:       Cervical back: Normal range of motion and neck supple.       Lumbar back: Normal.      Right lower leg: No edema. Left lower leg: Tenderness present. No deformity or bony tenderness. No edema. Legs:    Skin:     General: Skin is warm and dry. Neurological:      General: No focal deficit present. Mental Status: He is alert and oriented to person, place, and time.    Psychiatric:         Mood and Affect: Mood normal.         Behavior: Behavior normal.          MDM       Procedures

## 2022-01-08 ENCOUNTER — HOSPITAL ENCOUNTER (EMERGENCY)
Age: 37
Discharge: HOME | End: 2022-01-08
Attending: EMERGENCY MEDICINE
Payer: COMMERCIAL

## 2022-01-08 VITALS
RESPIRATION RATE: 18 BRPM | DIASTOLIC BLOOD PRESSURE: 101 MMHG | TEMPERATURE: 98.5 F | SYSTOLIC BLOOD PRESSURE: 157 MMHG | OXYGEN SATURATION: 98 % | HEART RATE: 80 BPM | HEIGHT: 71 IN | WEIGHT: 250 LBS | BODY MASS INDEX: 35 KG/M2

## 2022-01-08 DIAGNOSIS — B34.9 VIRAL ILLNESS: Primary | ICD-10-CM

## 2022-01-08 PROCEDURE — 99282 EMERGENCY DEPT VISIT SF MDM: CPT

## 2022-01-08 RX ORDER — BENZONATATE 100 MG/1
100 CAPSULE ORAL
Qty: 30 CAPSULE | Refills: 0 | Status: SHIPPED | OUTPATIENT
Start: 2022-01-08 | End: 2022-01-15

## 2022-01-08 NOTE — ED TRIAGE NOTES
Pt reports cough, chest congestion and Ha since last night. PT is not vaccinated for covid. PT in NAD at this time.

## 2022-01-08 NOTE — ED PROVIDER NOTES
EMERGENCY DEPARTMENT HISTORY AND PHYSICAL EXAM      Date: 1/8/2022  Patient Name: Ian Salomon    History of Presenting Illness     Chief Complaint   Patient presents with    Cough       History Provided By: Patient    HPI: Ian Salomon, 31 y.o. male with a past medical history significant No significant past medical history presents to the ED with cc of cough present for 1 day denies any history of smoking no asthma    There are no other complaints, changes, or physical findings at this time. PCP: MATHIEU Sharma    No current facility-administered medications on file prior to encounter. Current Outpatient Medications on File Prior to Encounter   Medication Sig Dispense Refill    [DISCONTINUED] cyclobenzaprine (FLEXERIL) 10 mg tablet Take 1 Tablet by mouth three (3) times daily as needed for Muscle Spasm(s). (Patient not taking: Reported on 10/27/2021) 20 Tablet 0       Past History     Past Medical History:  Past Medical History:   Diagnosis Date    Kidney stone        Past Surgical History:  Past Surgical History:   Procedure Laterality Date    HX LITHOTRIPSY  01/20/2021    Extracorporeal shockwave lithotripsy    HX ORTHOPAEDIC Right     Thumb reattachment    HX SHOULDER ARTHROSCOPY         Family History:  Family History   Problem Relation Age of Onset    No Known Problems Mother     No Known Problems Father        Social History:  Social History     Tobacco Use    Smoking status: Former Smoker     Years: 3.00    Smokeless tobacco: Never Used    Tobacco comment: pt states quit several years    Vaping Use    Vaping Use: Former    Substances: CBD    Devices: Disposable   Substance Use Topics    Alcohol use:  Yes     Alcohol/week: 2.0 standard drinks     Types: 2 Cans of beer per week     Comment: occasionally    Drug use: Not Currently     Types: Marijuana     Comment: pt states last used on 1/1/2021       Allergies:  No Known Allergies      Review of Systems     Review of Systems Constitutional: Negative for chills and fever. HENT: Positive for congestion. Negative for rhinorrhea and sore throat. Eyes: Negative for pain and visual disturbance. Respiratory: Positive for cough. Negative for shortness of breath. Cardiovascular: Negative for chest pain and leg swelling. Gastrointestinal: Negative for abdominal pain and vomiting. Endocrine: Negative for polydipsia and polyuria. Genitourinary: Negative for dysuria and hematuria. Musculoskeletal: Negative for back pain and neck pain. Skin: Negative for color change and pallor. Neurological: Negative for weakness and headaches. Psychiatric/Behavioral: Negative for agitation and suicidal ideas. Physical Exam     Physical Exam  Vitals and nursing note reviewed. Constitutional:       General: He is not in acute distress. Appearance: He is not ill-appearing, toxic-appearing or diaphoretic. HENT:      Head: Normocephalic and atraumatic. Right Ear: Tympanic membrane normal.      Left Ear: Tympanic membrane normal.      Nose: Nose normal. No congestion. Mouth/Throat:      Mouth: Mucous membranes are moist.      Pharynx: Oropharynx is clear. Eyes:      Extraocular Movements: Extraocular movements intact. Conjunctiva/sclera: Conjunctivae normal.      Pupils: Pupils are equal, round, and reactive to light. Cardiovascular:      Rate and Rhythm: Normal rate and regular rhythm. Pulses: Normal pulses. Heart sounds: Normal heart sounds. Pulmonary:      Effort: Pulmonary effort is normal.      Breath sounds: Normal breath sounds. Abdominal:      General: Bowel sounds are normal.      Palpations: Abdomen is soft. Tenderness: There is no abdominal tenderness. Musculoskeletal:         General: No tenderness, deformity or signs of injury. Normal range of motion. Cervical back: Normal range of motion and neck supple. No rigidity or tenderness.    Lymphadenopathy:      Cervical: No cervical adenopathy. Skin:     General: Skin is warm and dry. Capillary Refill: Capillary refill takes less than 2 seconds. Findings: No rash. Neurological:      General: No focal deficit present. Mental Status: He is alert and oriented to person, place, and time. Cranial Nerves: No cranial nerve deficit. Sensory: No sensory deficit. Psychiatric:         Mood and Affect: Mood normal.         Behavior: Behavior normal.         Lab and Diagnostic Study Results     Labs -   No results found for this or any previous visit (from the past 12 hour(s)). Radiologic Studies -   @lastxrresult@  CT Results  (Last 48 hours)    None        CXR Results  (Last 48 hours)    None            Medical Decision Making   - I am the first provider for this patient. - I reviewed the vital signs, available nursing notes, past medical history, past surgical history, family history and social history. - Initial assessment performed. The patients presenting problems have been discussed, and they are in agreement with the care plan formulated and outlined with them. I have encouraged them to ask questions as they arise throughout their visit. Vital Signs-Reviewed the patient's vital signs. Patient Vitals for the past 12 hrs:   Temp Pulse Resp BP SpO2   01/08/22 1617 98.5 °F (36.9 °C) 80 18 (!) 157/101 98 %       Records Reviewed: Nursing Notes    The patient presents with cough with a differential diagnosis of URI, asthma, pneumonia      ED Course:          Provider Notes (Medical Decision Making): MDM       Procedures   Medical Decision Makingedical Decision Making  Performed by: Nayan Alcantar MD  PROCEDURES:  Procedures       Disposition   Disposition: Condition stable and improved  DC- Adult Discharges: All of the diagnostic tests were reviewed and questions answered. Diagnosis, care plan and treatment options were discussed.   The patient understands the instructions and will follow up as directed. The patients results have been reviewed with them. They have been counseled regarding their diagnosis. The patient verbally convey understanding and agreement of the signs, symptoms, diagnosis, treatment and prognosis and additionally agrees to follow up as recommended with their PCP in 24 - 48 hours. They also agree with the care-plan and convey that all of their questions have been answered. I have also put together some discharge instructions for them that include: 1) educational information regarding their diagnosis, 2) how to care for their diagnosis at home, as well a 3) list of reasons why they would want to return to the ED prior to their follow-up appointment, should their condition change. DISCHARGE PLAN:  1. There are no discharge medications for this patient. 2.   Follow-up Information    None       3. Return to ED if worse   4. There are no discharge medications for this patient. Diagnosis     Clinical Impression: No diagnosis found. Attestations:    Rock Thurman MD    Please note that this dictation was completed with [employer], the computer voice recognition software. Quite often unanticipated grammatical, syntax, homophones, and other interpretive errors are inadvertently transcribed by the computer software. Please disregard these errors. Please excuse any errors that have escaped final proofreading. Thank you.

## 2022-01-08 NOTE — DISCHARGE INSTRUCTIONS
For routine COVID-19 testing contact [facility] or [facility] pharmacy or your primary care physician

## 2022-01-18 ENCOUNTER — HOSPITAL ENCOUNTER (EMERGENCY)
Age: 37
Discharge: HOME | End: 2022-01-18
Attending: EMERGENCY MEDICINE | Admitting: EMERGENCY MEDICINE
Payer: COMMERCIAL

## 2022-01-18 VITALS
TEMPERATURE: 98.6 F | BODY MASS INDEX: 35.7 KG/M2 | OXYGEN SATURATION: 98 % | SYSTOLIC BLOOD PRESSURE: 169 MMHG | WEIGHT: 255 LBS | HEIGHT: 71 IN | DIASTOLIC BLOOD PRESSURE: 106 MMHG | RESPIRATION RATE: 18 BRPM | HEART RATE: 99 BPM

## 2022-01-18 DIAGNOSIS — M75.51 BURSITIS OF RIGHT SHOULDER: Primary | ICD-10-CM

## 2022-01-18 PROCEDURE — 74011250637 HC RX REV CODE- 250/637: Performed by: EMERGENCY MEDICINE

## 2022-01-18 PROCEDURE — 99282 EMERGENCY DEPT VISIT SF MDM: CPT

## 2022-01-18 PROCEDURE — 74011250636 HC RX REV CODE- 250/636: Performed by: EMERGENCY MEDICINE

## 2022-01-18 PROCEDURE — 74011000250 HC RX REV CODE- 250: Performed by: EMERGENCY MEDICINE

## 2022-01-18 PROCEDURE — 96372 THER/PROPH/DIAG INJ SC/IM: CPT

## 2022-01-18 RX ORDER — BUPIVACAINE HYDROCHLORIDE 5 MG/ML
3 INJECTION, SOLUTION EPIDURAL; INTRACAUDAL ONCE
Status: COMPLETED | OUTPATIENT
Start: 2022-01-18 | End: 2022-01-18

## 2022-01-18 RX ORDER — IBUPROFEN 800 MG/1
800 TABLET ORAL
Qty: 20 TABLET | Refills: 0 | Status: SHIPPED | OUTPATIENT
Start: 2022-01-18 | End: 2022-01-25

## 2022-01-18 RX ORDER — METHYLPREDNISOLONE ACETATE 40 MG/ML
40 INJECTION, SUSPENSION INTRA-ARTICULAR; INTRALESIONAL; INTRAMUSCULAR; SOFT TISSUE ONCE
Status: COMPLETED | OUTPATIENT
Start: 2022-01-18 | End: 2022-01-18

## 2022-01-18 RX ORDER — IBUPROFEN 800 MG/1
800 TABLET ORAL ONCE
Status: COMPLETED | OUTPATIENT
Start: 2022-01-18 | End: 2022-01-18

## 2022-01-18 RX ADMIN — BUPIVACAINE HYDROCHLORIDE 15 MG: 5 INJECTION, SOLUTION EPIDURAL; INTRACAUDAL at 18:16

## 2022-01-18 RX ADMIN — METHYLPREDNISOLONE ACETATE 40 MG: 40 INJECTION, SUSPENSION INTRA-ARTICULAR; INTRALESIONAL; INTRAMUSCULAR; INTRASYNOVIAL; SOFT TISSUE at 18:16

## 2022-01-18 RX ADMIN — IBUPROFEN 800 MG: 800 TABLET, FILM COATED ORAL at 18:30

## 2022-01-18 NOTE — ED PROVIDER NOTES
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 He presents today complaining of pain at the medial and distal tip of the right scapula sharp pain beginning approximately 2 days ago worse with movement. (Nursing notes appreciated no pain in the left scapular area). Past Medical History:   Diagnosis Date    Kidney stone        Past Surgical History:   Procedure Laterality Date    HX LITHOTRIPSY  01/20/2021    Extracorporeal shockwave lithotripsy    HX ORTHOPAEDIC Right     Thumb reattachment    HX SHOULDER ARTHROSCOPY           Family History:   Problem Relation Age of Onset    No Known Problems Mother     No Known Problems Father        Social History     Socioeconomic History    Marital status:      Spouse name: Not on file    Number of children: Not on file    Years of education: Not on file    Highest education level: Not on file   Occupational History    Not on file   Tobacco Use    Smoking status: Former Smoker     Years: 3.00    Smokeless tobacco: Never Used    Tobacco comment: pt states quit several years    Vaping Use    Vaping Use: Former    Substances: CBD    Devices: Disposable   Substance and Sexual Activity    Alcohol use: Yes     Alcohol/week: 2.0 standard drinks     Types: 2 Cans of beer per week     Comment: occasionally    Drug use: Not Currently     Types: Marijuana     Comment: pt states last used on 1/1/2021    Sexual activity: Not on file   Other Topics Concern    Not on file   Social History Narrative    Not on file     Social Determinants of Health     Financial Resource Strain:     Difficulty of Paying Living Expenses: Not on file   Food Insecurity:     Worried About Running Out of Food in the Last Year: Not on file    [place] of Food in the Last Year: Not on file   Transportation Needs:     Lack of Transportation (Medical):  Not on file    Lack of Transportation (Non-Medical): Not on file   Physical Activity:     Days of Exercise per Week: Not on file    Minutes of Exercise per Session: Not on file   Stress:     Feeling of Stress : Not on file   Social Connections:     Frequency of Communication with Friends and Family: Not on file    Frequency of Social Gatherings with Friends and Family: Not on file    Attends Evangelical Services: Not on file    Active Member of [address] [place] [address] or Organizations: Not on file    Attends Club or Organization Meetings: Not on file    Marital Status: Not on file   Intimate Partner Violence:     Fear of Current or Ex-Partner: Not on file    Emotionally Abused: Not on file    Physically Abused: Not on file    Sexually Abused: Not on file   Housing Stability:     Unable to Pay for Housing in the Last Year: Not on file    Number of [place] in the Last Year: Not on file    Unstable Housing in the Last Year: Not on file         ALLERGIES: Patient has no known allergies. Review of Systems   HENT: Negative. Eyes: Negative. Respiratory: Negative. Cardiovascular: Negative. All other systems reviewed and are negative. Vitals:    01/18/22 1737   BP: (!) 169/106   Pulse: 99   Resp: 18   Temp: 98.6 °F (37 °C)   SpO2: 98%   Weight: 115.7 kg (255 lb)   Height: 5' 11\" (1.803 m)            Physical Exam  Vitals and nursing note reviewed. Constitutional:       General: He is not in acute distress. Appearance: Normal appearance. He is not ill-appearing, toxic-appearing or diaphoretic. HENT:      Head: Normocephalic and atraumatic. Nose: Nose normal.      Mouth/Throat:      Mouth: Mucous membranes are moist.   Eyes:      Extraocular Movements: Extraocular movements intact. Conjunctiva/sclera: Conjunctivae normal.   Cardiovascular:      Rate and Rhythm: Normal rate and regular rhythm. Pulses: Normal pulses. Heart sounds: Normal heart sounds. No murmur heard.       Pulmonary:      Effort: Pulmonary effort is normal. No respiratory distress. Breath sounds: Normal breath sounds. No wheezing, rhonchi or rales. Abdominal:      General: Bowel sounds are normal. There is no distension. Palpations: Abdomen is soft. There is no mass. Tenderness: There is no abdominal tenderness. There is no right CVA tenderness, left CVA tenderness, guarding or rebound. Hernia: No hernia is present. Musculoskeletal:         General: Tenderness present. No swelling, deformity or signs of injury. Normal range of motion. Arms:       Cervical back: Normal range of motion and neck supple. No rigidity or tenderness. Right lower leg: No edema. Left lower leg: No edema. Comments: Tender medial and distal tip of the right scapula minimal palpation worsens pain. Old surgical scar left anterior axilla there is no palpable recurrence of tumor no lymphadenopathy both upper extremities have normal neurovascular exam with good pulses good capillary refill and normal sensation   Lymphadenopathy:      Cervical: No cervical adenopathy. Skin:     General: Skin is warm and dry. Capillary Refill: Capillary refill takes less than 2 seconds. Findings: No erythema, lesion or rash. Neurological:      General: No focal deficit present. Mental Status: He is alert and oriented to person, place, and time. Mental status is at baseline. Cranial Nerves: No cranial nerve deficit. Sensory: No sensory deficit. Psychiatric:         Mood and Affect: Mood normal.         Behavior: Behavior normal.         Thought Content:  Thought content normal.          MDM       Procedures injection of right medial scapula along the point of maximal tenderness with Depo-Medrol mixed with Marcaine alcohol prep, patient tolerated well

## 2022-01-18 NOTE — ED TRIAGE NOTES
Pt reports that he had tumor in left arm removed x1 year ago and has now noted new lump in left armpit. Also endorses stabbing pain in left arm and pain in bilateral shoulder blades/back.

## 2022-01-18 NOTE — ED NOTES
Dr. Shona Pascual reviewed discharge instructions with the patient. The patient verbalized understanding. All questions and concerns were addressed. The patient declined a wheelchair and is discharged ambulatory in the care of family members with instructions and prescriptions in hand. Pt is alert and oriented x 4. Respirations are clear and unlabored.

## 2022-01-28 ENCOUNTER — HOSPITAL ENCOUNTER (EMERGENCY)
Age: 37
Discharge: HOME | End: 2022-01-28
Attending: EMERGENCY MEDICINE
Payer: COMMERCIAL

## 2022-01-28 VITALS
WEIGHT: 280 LBS | TEMPERATURE: 97.5 F | HEIGHT: 71 IN | BODY MASS INDEX: 39.2 KG/M2 | OXYGEN SATURATION: 98 % | RESPIRATION RATE: 18 BRPM | DIASTOLIC BLOOD PRESSURE: 120 MMHG | HEART RATE: 85 BPM | SYSTOLIC BLOOD PRESSURE: 168 MMHG

## 2022-01-28 DIAGNOSIS — G43.111 INTRACTABLE MIGRAINE WITH AURA WITH STATUS MIGRAINOSUS: Primary | ICD-10-CM

## 2022-01-28 PROCEDURE — 74011250636 HC RX REV CODE- 250/636: Performed by: EMERGENCY MEDICINE

## 2022-01-28 PROCEDURE — 99283 EMERGENCY DEPT VISIT LOW MDM: CPT

## 2022-01-28 PROCEDURE — 96372 THER/PROPH/DIAG INJ SC/IM: CPT

## 2022-01-28 PROCEDURE — 74011250637 HC RX REV CODE- 250/637: Performed by: EMERGENCY MEDICINE

## 2022-01-28 RX ORDER — OXYCODONE AND ACETAMINOPHEN 5; 325 MG/1; MG/1
1 TABLET ORAL
Status: DISCONTINUED | OUTPATIENT
Start: 2022-01-28 | End: 2022-01-28

## 2022-01-28 RX ORDER — KETOROLAC TROMETHAMINE 30 MG/ML
60 INJECTION, SOLUTION INTRAMUSCULAR; INTRAVENOUS
Status: COMPLETED | OUTPATIENT
Start: 2022-01-28 | End: 2022-01-28

## 2022-01-28 RX ORDER — OXYCODONE AND ACETAMINOPHEN 5; 325 MG/1; MG/1
2 TABLET ORAL
Status: COMPLETED | OUTPATIENT
Start: 2022-01-28 | End: 2022-01-28

## 2022-01-28 RX ORDER — ONDANSETRON 4 MG/1
4 TABLET, ORALLY DISINTEGRATING ORAL
Status: COMPLETED | OUTPATIENT
Start: 2022-01-28 | End: 2022-01-28

## 2022-01-28 RX ADMIN — OXYCODONE AND ACETAMINOPHEN 2 TABLET: 5; 325 TABLET ORAL at 19:10

## 2022-01-28 RX ADMIN — ONDANSETRON 4 MG: 4 TABLET, ORALLY DISINTEGRATING ORAL at 19:10

## 2022-01-28 RX ADMIN — KETOROLAC TROMETHAMINE 60 MG: 30 INJECTION, SOLUTION INTRAMUSCULAR at 19:10

## 2022-01-28 NOTE — ED TRIAGE NOTES
.  Chief Complaint   Patient presents with    Headache     Pt presents with c/o headache with pain to left eye that has been ongoing since last night, pt denies any kowalski ge to vision, taking otc medications w/o relief. Pt rates pain 10/10.

## 2022-01-28 NOTE — ED PROVIDER NOTES
EMERGENCY DEPARTMENT HISTORY AND PHYSICAL EXAM      Date: (Not on file)  Patient Name: Zora Davila    History of Presenting Illness     No chief complaint on file. History Provided By: Patient    HPI: Zora Davila, 31 y.o. male with a past medical history significant kidney stones presents to the ED with cc of headache with radiation to the left eye with blurred vison. Pain scale is 10/10. Patient states this is not the worse head ache he has had. He took over the OTC without relief. Hx of migraine headache. There are no other complaints, changes, or physical findings at this time. PCP: MATHIEU Mehta    No current facility-administered medications on file prior to encounter. No current outpatient medications on file prior to encounter. Past History     Past Medical History:  Past Medical History:   Diagnosis Date    Kidney stone        Past Surgical History:  Past Surgical History:   Procedure Laterality Date    HX LITHOTRIPSY  01/20/2021    Extracorporeal shockwave lithotripsy    HX ORTHOPAEDIC Right     Thumb reattachment    HX SHOULDER ARTHROSCOPY         Family History:  Family History   Problem Relation Age of Onset    No Known Problems Mother     No Known Problems Father        Social History:  Social History     Tobacco Use    Smoking status: Former Smoker     Years: 3.00    Smokeless tobacco: Never Used    Tobacco comment: pt states quit several years    Vaping Use    Vaping Use: Former    Substances: CBD    Devices: Disposable   Substance Use Topics    Alcohol use: Yes     Alcohol/week: 2.0 standard drinks     Types: 2 Cans of beer per week     Comment: occasionally    Drug use: Not Currently     Types: Marijuana     Comment: pt states last used on 1/1/2021       Allergies:  No Known Allergies      Review of Systems     Review of Systems   Constitutional: Negative. HENT: Negative. Eyes: Negative. Respiratory: Negative. Cardiovascular: Negative. Gastrointestinal: Positive for nausea. Endocrine: Negative. Genitourinary: Negative. Musculoskeletal: Negative. Skin: Negative. Allergic/Immunologic: Negative. Neurological: Positive for headaches. Hematological: Negative. Psychiatric/Behavioral: Negative. All other systems reviewed and are negative. Physical Exam     Physical Exam  Vitals and nursing note reviewed. Constitutional:       Appearance: Normal appearance. HENT:      Head: Normocephalic. Nose: Nose normal.      Mouth/Throat:      Mouth: Mucous membranes are moist.   Eyes:      Pupils: Pupils are equal, round, and reactive to light. Cardiovascular:      Rate and Rhythm: Normal rate. Pulmonary:      Effort: Pulmonary effort is normal.   Abdominal:      General: Abdomen is flat. Musculoskeletal:         General: Tenderness present. Normal range of motion. Cervical back: Normal range of motion. Comments: Tenderness on the right side   Skin:     General: Skin is warm. Neurological:      General: No focal deficit present. Mental Status: He is alert and oriented to person, place, and time. Comments: Blurred vison   Psychiatric:         Mood and Affect: Mood normal.         Lab and Diagnostic Study Results     Labs -   No results found for this or any previous visit (from the past 12 hour(s)). Radiologic Studies -   @lastxrresult@  CT Results  (Last 48 hours)    None        CXR Results  (Last 48 hours)    None            Medical Decision Making   - I am the first provider for this patient. - I reviewed the vital signs, available nursing notes, past medical history, past surgical history, family history and social history. - Initial assessment performed. The patients presenting problems have been discussed, and they are in agreement with the care plan formulated and outlined with them. I have encouraged them to ask questions as they arise throughout their visit.     Vital Signs-Reviewed the patient's vital signs. No data found. Records Reviewed: Nursing Notes    The patient presents with headache with a differential diagnosis of  cerebral hemorrhage, cluster headache, CVA, ICH, meningitis/encephalitis, migraine, sinusitis, tension headache, toxic and vascular headache      ED Course:          Provider Notes (Medical Decision Making): MDM       Procedures   Medical Decision Makingedical Decision Making  Performed by: Harriett Younger MD  PROCEDURES:  Procedures       Disposition   Disposition: Condition stable  DC- Adult Discharges: All of the diagnostic tests were reviewed and questions answered. Diagnosis, care plan and treatment options were discussed. The patient understands the instructions and will follow up as directed. The patients results have been reviewed with them. They have been counseled regarding their diagnosis. The patient verbally convey understanding and agreement of the signs, symptoms, diagnosis, treatment and prognosis and additionally agrees to follow up as recommended with their PCP in 24 - 48 hours. They also agree with the care-plan and convey that all of their questions have been answered. I have also put together some discharge instructions for them that include: 1) educational information regarding their diagnosis, 2) how to care for their diagnosis at home, as well a 3) list of reasons why they would want to return to the ED prior to their follow-up appointment, should their condition change. DISCHARGE PLAN:  1. There are no discharge medications for this patient. 2.   Follow-up Information    None       3. Return to ED if worse   4. There are no discharge medications for this patient. Diagnosis     Clinical Impression:     ICD-10-CM ICD-9-CM    1. Intractable migraine with aura with status migrainosus  G43. 111 [id number]    Attestations:    Harriett Younger MD    Please note that this dictation was completed with [employer], the computer voice recognition software. Quite often unanticipated grammatical, syntax, homophones, and other interpretive errors are inadvertently transcribed by the computer software. Please disregard these errors. Please excuse any errors that have escaped final proofreading. Thank you.

## 2022-01-29 NOTE — ED NOTES
Discussed with the patient and all questioned fully answered. I have reviewed discharge instructions with the PATIENT. The PATIENT  verbalized understanding.

## 2022-02-16 LAB — SARS-COV-2, COV2: NORMAL

## 2022-03-19 PROBLEM — N20.1 CALCULUS OF URETER: Status: ACTIVE | Noted: 2021-01-20

## 2022-03-24 ENCOUNTER — HOSPITAL ENCOUNTER (EMERGENCY)
Age: 37
Discharge: HOME | End: 2022-03-24
Attending: EMERGENCY MEDICINE
Payer: COMMERCIAL

## 2022-03-24 VITALS
DIASTOLIC BLOOD PRESSURE: 111 MMHG | HEIGHT: 71 IN | TEMPERATURE: 98 F | WEIGHT: 290 LBS | RESPIRATION RATE: 18 BRPM | BODY MASS INDEX: 40.6 KG/M2 | OXYGEN SATURATION: 98 % | HEART RATE: 92 BPM | SYSTOLIC BLOOD PRESSURE: 133 MMHG

## 2022-03-24 DIAGNOSIS — G44.52 NEW DAILY PERSISTENT HEADACHE: Primary | ICD-10-CM

## 2022-03-24 PROCEDURE — 74011250637 HC RX REV CODE- 250/637: Performed by: EMERGENCY MEDICINE

## 2022-03-24 PROCEDURE — 99283 EMERGENCY DEPT VISIT LOW MDM: CPT

## 2022-03-24 RX ORDER — METOPROLOL TARTRATE 50 MG/1
50 TABLET ORAL
Status: COMPLETED | OUTPATIENT
Start: 2022-03-24 | End: 2022-03-24

## 2022-03-24 RX ORDER — BUTALBITAL, ACETAMINOPHEN AND CAFFEINE 50; 325; 40 MG/1; MG/1; MG/1
2 TABLET ORAL
Status: COMPLETED | OUTPATIENT
Start: 2022-03-24 | End: 2022-03-24

## 2022-03-24 RX ORDER — DIPHENHYDRAMINE HCL 25 MG
25 TABLET ORAL
Status: COMPLETED | OUTPATIENT
Start: 2022-03-24 | End: 2022-03-24

## 2022-03-24 RX ADMIN — METOPROLOL TARTRATE 50 MG: 50 TABLET, FILM COATED ORAL at 18:33

## 2022-03-24 RX ADMIN — BUTALBITAL, ACETAMINOPHEN, AND CAFFEINE 2 TABLET: 50; 325; 40 TABLET ORAL at 18:33

## 2022-03-24 RX ADMIN — DIPHENHYDRAMINE HYDROCHLORIDE 25 MG: 25 TABLET ORAL at 18:33

## 2022-03-24 NOTE — ED PROVIDER NOTES
HPI   Patient reports experiencing a headache through most of the day today. Is described as dull and aching and moderate. He denies severe pain. Entered behind the left eye. Denies visual disturbance, nausea, oral, severe pain. Patient has a history of headaches and reports this feels quite similar. Past Medical History:   Diagnosis Date    Kidney stone        Past Surgical History:   Procedure Laterality Date    HX LITHOTRIPSY  01/20/2021    Extracorporeal shockwave lithotripsy    HX ORTHOPAEDIC Right     Thumb reattachment    HX SHOULDER ARTHROSCOPY           Family History:   Problem Relation Age of Onset    No Known Problems Mother     No Known Problems Father        Social History     Socioeconomic History    Marital status:      Spouse name: Not on file    Number of children: Not on file    Years of education: Not on file    Highest education level: Not on file   Occupational History    Not on file   Tobacco Use    Smoking status: Former Smoker     Years: 3.00    Smokeless tobacco: Never Used    Tobacco comment: pt states quit several years    Vaping Use    Vaping Use: Former    Substances: CBD    Devices: Disposable   Substance and Sexual Activity    Alcohol use: Yes     Alcohol/week: 2.0 standard drinks     Types: 2 Cans of beer per week     Comment: occasionally    Drug use: Not Currently     Types: Marijuana     Comment: pt states last used on 1/1/2021    Sexual activity: Not on file   Other Topics Concern    Not on file   Social History Narrative    Not on file     Social Determinants of Health     Financial Resource Strain:     Difficulty of Paying Living Expenses: Not on file   Food Insecurity:     Worried About Running Out of Food in the Last Year: Not on file    Iman of Food in the Last Year: Not on file   Transportation Needs:     Lack of Transportation (Medical): Not on file    Lack of Transportation (Non-Medical):  Not on file   Physical Activity:     Days of Exercise per Week: Not on file    Minutes of Exercise per Session: Not on file   Stress:     Feeling of Stress : Not on file   Social Connections:     Frequency of Communication with Friends and Family: Not on file    Frequency of Social Gatherings with Friends and Family: Not on file    Attends Baptism Services: Not on file    Active Member of [address] [place] [address] [employer] or Organizations: Not on file    Attends Club or Organization Meetings: Not on file    Marital Status: Not on file   Intimate Partner Violence:     Fear of Current or Ex-Partner: Not on file    Emotionally Abused: Not on file    Physically Abused: Not on file    Sexually Abused: Not on file   Housing Stability:     Unable to Pay for Housing in the Last Year: Not on file    Number of [place] in the Last Year: Not on file    Unstable Housing in the Last Year: Not on file         ALLERGIES: Patient has no known allergies. Review of Systems   Constitutional: Negative. HENT: Negative. Eyes: Negative. Respiratory: Negative. Cardiovascular: Negative. Gastrointestinal: Negative. Endocrine: Negative. Genitourinary: Negative. Musculoskeletal: Positive for back pain. Allergic/Immunologic: Negative. Neurological: Positive for headaches. Hematological: Negative. Psychiatric/Behavioral: Negative. All other systems reviewed and are negative. Vitals:    03/24/22 1826   BP: (!) 152/112   Pulse: 91   Resp: 18   Temp: 98 °F (36.7 °C)   SpO2: 99%   Weight: 131.5 kg (290 lb)   Height: 5' 11\" (1.803 m)            Physical Exam  Vitals and nursing note reviewed. Constitutional:       General: He is not in acute distress. Appearance: Normal appearance. He is obese. He is not ill-appearing, toxic-appearing or diaphoretic. HENT:      Head: Normocephalic and atraumatic. Nose: Nose normal.      Mouth/Throat:      Mouth: Mucous membranes are moist.      Pharynx: Oropharynx is clear.    Eyes:      Extraocular Movements: Extraocular movements intact. Conjunctiva/sclera: Conjunctivae normal.      Pupils: Pupils are equal, round, and reactive to light. Cardiovascular:      Pulses: Normal pulses. Pulmonary:      Effort: Pulmonary effort is normal. No respiratory distress. Musculoskeletal:         General: No swelling, tenderness, deformity or signs of injury. Normal range of motion. Cervical back: Normal range of motion. No rigidity. Right lower leg: No edema. Left lower leg: No edema. Skin:     General: Skin is warm and dry. Coloration: Skin is not jaundiced or pale. Findings: No bruising, erythema, lesion or rash. Neurological:      General: No focal deficit present. Mental Status: He is alert and oriented to person, place, and time. Mental status is at baseline. Cranial Nerves: No cranial nerve deficit. Sensory: No sensory deficit. Motor: No weakness. Coordination: Coordination normal.      Gait: Gait normal.   Psychiatric:         Mood and Affect: Mood normal.         Behavior: Behavior normal.          MDM     Patient is well-appearing and has history of headache. Therefore do not suspect a dangerous CNS or vascular process. Will work on symptom control and discharge.   Procedures Yes

## 2022-03-24 NOTE — ED TRIAGE NOTES
Pt reports headache starting today while at work. Pt also reports pain behind left eye. Reports Photophobia and sensitivity to noise.

## 2022-06-10 ENCOUNTER — HOSPITAL ENCOUNTER (EMERGENCY)
Age: 37
Discharge: HOME | End: 2022-06-11
Attending: EMERGENCY MEDICINE
Payer: COMMERCIAL

## 2022-06-10 ENCOUNTER — APPOINTMENT (OUTPATIENT)
Dept: GENERAL RADIOLOGY | Age: 37
End: 2022-06-10
Attending: EMERGENCY MEDICINE
Payer: COMMERCIAL

## 2022-06-10 DIAGNOSIS — S01.81XA FACIAL LACERATION, INITIAL ENCOUNTER: Primary | ICD-10-CM

## 2022-06-10 LAB
ALBUMIN SERPL-MCNC: 3.5 G/DL (ref 3.5–5)
ALBUMIN/GLOB SERPL: 1 {RATIO} (ref 1.1–2.2)
ALP SERPL-CCNC: 82 U/L (ref 45–117)
ALT SERPL-CCNC: 56 U/L (ref 12–78)
ANION GAP SERPL CALC-SCNC: 15 MMOL/L (ref 5–15)
AST SERPL W P-5'-P-CCNC: 26 U/L (ref 15–37)
BASOPHILS # BLD: 0 K/UL (ref 0–0.2)
BASOPHILS NFR BLD: 0 % (ref 0–2.5)
BILIRUB SERPL-MCNC: 0.2 MG/DL (ref 0.2–1)
BUN SERPL-MCNC: 14 MG/DL (ref 6–20)
BUN/CREAT SERPL: 13 (ref 12–20)
CA-I BLD-MCNC: 9.6 MG/DL (ref 8.5–10.1)
CHLORIDE SERPL-SCNC: 105 MMOL/L (ref 97–108)
CO2 SERPL-SCNC: 22 MMOL/L (ref 21–32)
CREAT SERPL-MCNC: 1.07 MG/DL (ref 0.7–1.3)
EOSINOPHIL # BLD: 0.1 K/UL (ref 0–0.7)
EOSINOPHIL NFR BLD: 2 % (ref 0.9–2.9)
ERYTHROCYTE [DISTWIDTH] IN BLOOD BY AUTOMATED COUNT: 15.2 % (ref 11.5–14.5)
ETHANOL SERPL-MCNC: 121 MG/DL
GLOBULIN SER CALC-MCNC: 3.6 G/DL (ref 2–4)
GLUCOSE SERPL-MCNC: 94 MG/DL (ref 65–100)
HCT VFR BLD AUTO: 39.3 % (ref 41–53)
HGB BLD-MCNC: 13 G/DL (ref 13.5–17.5)
INR PPP: 1.2 (ref 0.9–1.1)
LYMPHOCYTES # BLD: 3 K/UL (ref 1–4.8)
LYMPHOCYTES NFR BLD: 34 % (ref 20.5–51.1)
MCH RBC QN AUTO: 29.8 PG (ref 31–34)
MCHC RBC AUTO-ENTMCNC: 33.1 G/DL (ref 31–36)
MCV RBC AUTO: 90 FL (ref 80–100)
MONOCYTES # BLD: 0.8 K/UL (ref 0.2–2.4)
MONOCYTES NFR BLD: 9 % (ref 1.7–9.3)
NEUTS SEG # BLD: 4.8 K/UL (ref 1.8–7.7)
NEUTS SEG NFR BLD: 55 % (ref 42–75)
NRBC # BLD: 0.02 K/UL
NRBC BLD-RTO: 0.2 PER 100 WBC
PLATELET # BLD AUTO: 429 K/UL (ref 150–400)
PMV BLD AUTO: 7.2 FL (ref 6.5–11.5)
POTASSIUM SERPL-SCNC: 3.1 MMOL/L (ref 3.5–5.1)
PROT SERPL-MCNC: 7.1 G/DL (ref 6.4–8.2)
PROTHROMBIN TIME: 14.6 SEC (ref 11.9–14.6)
RBC # BLD AUTO: 4.37 M/UL (ref 4.5–5.9)
SODIUM SERPL-SCNC: 142 MMOL/L (ref 136–145)
WBC # BLD AUTO: 8.8 K/UL (ref 4.4–11.3)

## 2022-06-10 PROCEDURE — 90471 IMMUNIZATION ADMIN: CPT

## 2022-06-10 PROCEDURE — 80053 COMPREHEN METABOLIC PANEL: CPT

## 2022-06-10 PROCEDURE — 99285 EMERGENCY DEPT VISIT HI MDM: CPT

## 2022-06-10 PROCEDURE — 75810000293 HC SIMP/SUPERF WND  RPR

## 2022-06-10 PROCEDURE — 85610 PROTHROMBIN TIME: CPT

## 2022-06-10 PROCEDURE — 85025 COMPLETE CBC W/AUTO DIFF WBC: CPT

## 2022-06-10 PROCEDURE — 74011250636 HC RX REV CODE- 250/636: Performed by: EMERGENCY MEDICINE

## 2022-06-10 PROCEDURE — 71045 X-RAY EXAM CHEST 1 VIEW: CPT

## 2022-06-10 PROCEDURE — 36415 COLL VENOUS BLD VENIPUNCTURE: CPT

## 2022-06-10 PROCEDURE — 82077 ASSAY SPEC XCP UR&BREATH IA: CPT

## 2022-06-10 RX ADMIN — SODIUM CHLORIDE 1000 ML: 9 INJECTION, SOLUTION INTRAVENOUS at 23:20

## 2022-06-11 ENCOUNTER — APPOINTMENT (OUTPATIENT)
Dept: CT IMAGING | Age: 37
End: 2022-06-11
Attending: EMERGENCY MEDICINE
Payer: COMMERCIAL

## 2022-06-11 VITALS
HEIGHT: 71 IN | DIASTOLIC BLOOD PRESSURE: 69 MMHG | TEMPERATURE: 98 F | SYSTOLIC BLOOD PRESSURE: 126 MMHG | HEART RATE: 95 BPM | RESPIRATION RATE: 16 BRPM | WEIGHT: 290 LBS | BODY MASS INDEX: 40.6 KG/M2 | OXYGEN SATURATION: 98 %

## 2022-06-11 PROCEDURE — 74011250636 HC RX REV CODE- 250/636: Performed by: EMERGENCY MEDICINE

## 2022-06-11 PROCEDURE — 74011000250 HC RX REV CODE- 250: Performed by: EMERGENCY MEDICINE

## 2022-06-11 PROCEDURE — 90714 TD VACC NO PRESV 7 YRS+ IM: CPT | Performed by: EMERGENCY MEDICINE

## 2022-06-11 PROCEDURE — 71275 CT ANGIOGRAPHY CHEST: CPT

## 2022-06-11 PROCEDURE — 74011000636 HC RX REV CODE- 636: Performed by: EMERGENCY MEDICINE

## 2022-06-11 RX ORDER — MUPIROCIN 20 MG/G
OINTMENT TOPICAL DAILY
Status: DISCONTINUED | OUTPATIENT
Start: 2022-06-11 | End: 2022-06-11 | Stop reason: HOSPADM

## 2022-06-11 RX ORDER — LIDOCAINE HYDROCHLORIDE 10 MG/ML
10 INJECTION INFILTRATION; PERINEURAL ONCE
Status: COMPLETED | OUTPATIENT
Start: 2022-06-11 | End: 2022-06-11

## 2022-06-11 RX ADMIN — LIDOCAINE HYDROCHLORIDE 10 ML: 10 INJECTION, SOLUTION INFILTRATION; PERINEURAL at 01:25

## 2022-06-11 RX ADMIN — IOPAMIDOL 100 ML: 755 INJECTION, SOLUTION INTRAVENOUS at 00:41

## 2022-06-11 RX ADMIN — TETANUS AND DIPHTHERIA TOXOIDS ADSORBED 0.5 ML: 2; 2 INJECTION INTRAMUSCULAR at 01:24

## 2022-06-11 NOTE — ED TRIAGE NOTES
Patient presents with penetrating puncture wound to L anterior chest. Patient states he was up a ladder and fell 3-4 feet, cutting his chest on a nail. Patient Tshirt saturated with blood. Patient admits to ingestion of ETOH today.

## 2022-06-11 NOTE — ED PROVIDER NOTES
EMERGENCY DEPARTMENT HISTORY AND PHYSICAL EXAM  ?    Date: 6/10/2022  Patient Name: Priscilla Ramírez    History of Presenting Illness    Patient presents with:  Laceration      History Provided By: Patient    HPI: Priscilla Ramírez, 30 y.o. male with a past medical history significant for kidney stone presents to the ED with cc of nail puncture wound to the left anterior chest.  He fell off ladder and stuck a 2x4 with nails. Shortness of breath or pleuritic chest pain. There are no other complaints, changes, or physical findings at this time. PCP: MATHIEU Escobar        Past History    Past Medical History:  Past Medical History:  No date: Kidney stone    Past Surgical History:  Past Surgical History:  01/20/2021: HX LITHOTRIPSY     Comment:  Extracorporeal shockwave lithotripsy  No date: HX ORTHOPAEDIC; Right     Comment:  Thumb reattachment  No date: HX SHOULDER ARTHROSCOPY    Family History:  Review of patient's family history indicates:  Problem: No Known Problems     Relation: Mother         Age of Onset: (Not Specified)  Problem: No Known Problems     Relation: Father         Age of Onset: (Not Specified)      Social History:  Social History   Tobacco Use     Smoking status: Former Smoker       Years: 3.00     Smokeless tobacco: Never Used     Tobacco comment: pt states quit several years    Vaping Use     Vaping Use: Former       Substances: CBD       Devices: Disposable   Alcohol use: Yes     Alcohol/week: 2.0 standard drinks     Types: 2 Cans of beer per week     Comment: occasionally   Drug use: Not Currently     Types: Marijuana     Comment: pt states last used on 1/1/2021      Allergies:  No Known Allergies      Review of Systems  @[facility]    Physical Exam  @[facility]    Diagnostic Study Results    Labs -  No results found for this or any previous visit (from the past 12 hour(s)).     Radiologic Studies -   XR CHEST PORT    (Results Pending)  CT Results  (Last 48 hours)   None     CXR Results (Last 48 hours)   None       Medical Decision Making and ED Course  I am the first provider for this patient. I reviewed the vital signs, available nursing notes, past medical history, past surgical history, family history and social history. Vital Signs-Reviewed the patient's vital signs. Empty flowsheet group. Records Reviewed: Nursing Notes    Provider Notes (Medical Decision Making):   Patient has penetrating wound to the chest reportedly due to a nail. The wound does not appear to be consistent with nail injury. I would suspect puncture wound or skin tear. The wound appears to be due to bladed implement. The patient presents with pneumothorax, hemopneumothorax, vascular injury. ED Course:   Plain x-rays negative. CTA is also negative. Initial assessment performed. The patients presenting problems have been discussed, and they are in agreement with the care plan formulated and outlined with them. I have encouraged them to ask questions as they arise throughout their visit. Disposition    discharged        DISCHARGE PLAN:  1. There are no discharge medications for this patient. 2. Follow-up Information    None    3. Return to ED if worse     Diagnosis    Clinical Impression: No diagnosis found. Attestations:    Melvin Gallo MD    Please note that this dictation was completed with [employer], the computer voice recognition software. Quite often unanticipated grammatical, syntax, homophones, and other interpretive errors are inadvertently transcribed by the computer software. Please disregard these errors. Please excuse any errors that have escaped final proofreading. Thank you.    ?            Past Medical History:   Diagnosis Date    Kidney stone        Past Surgical History:   Procedure Laterality Date    HX LITHOTRIPSY  01/20/2021    Extracorporeal shockwave lithotripsy    HX ORTHOPAEDIC Right     Thumb reattachment    HX SHOULDER ARTHROSCOPY Family History:   Problem Relation Age of Onset    No Known Problems Mother     No Known Problems Father        Social History     Socioeconomic History    Marital status:      Spouse name: Not on file    Number of children: Not on file    Years of education: Not on file    Highest education level: Not on file   Occupational History    Not on file   Tobacco Use    Smoking status: Former Smoker     Years: 3.00    Smokeless tobacco: Never Used    Tobacco comment: pt states quit several years    Vaping Use    Vaping Use: Former    Substances: CBD    Devices: Disposable   Substance and Sexual Activity    Alcohol use: Yes     Alcohol/week: 2.0 standard drinks     Types: 2 Cans of beer per week     Comment: occasionally    Drug use: Not Currently     Types: Marijuana     Comment: pt states last used on 1/1/2021    Sexual activity: Not on file   Other Topics Concern    Not on file   Social History Narrative    Not on file     Social Determinants of Health     Financial Resource Strain:     Difficulty of Paying Living Expenses: Not on file   Food Insecurity:     Worried About Running Out of Food in the Last Year: Not on file    [place]man of Food in the Last Year: Not on file   Transportation Needs:     Lack of Transportation (Medical): Not on file    Lack of Transportation (Non-Medical):  Not on file   Physical Activity:     Days of Exercise per Week: Not on file    Minutes of Exercise per Session: Not on file   Stress:     Feeling of Stress : Not on file   Social Connections:     Frequency of Communication with Friends and Family: Not on file    Frequency of Social Gatherings with Friends and Family: Not on file    Attends Jewish Services: Not on file    Active Member of Clubs or Organizations: Not on file    Attends Club or Organization Meetings: Not on file    Marital Status: Not on file   Intimate Partner Violence:     Fear of Current or Ex-Partner: Not on file   [employer] Abused: Not on file    Physically Abused: Not on file    Sexually Abused: Not on file   Housing Stability:     Unable to Pay for Housing in the Last Year: Not on file    Number of Places Lived in the Last Year: Not on file    Unstable Housing in the Last Year: Not on file         ALLERGIES: Patient has no known allergies. Review of Systems   Constitutional: Negative. HENT: Negative. Eyes: Negative. Respiratory: Negative. Cardiovascular: Negative. Gastrointestinal: Negative. Endocrine: Negative. Genitourinary: Negative. Musculoskeletal: Negative. Skin: Positive for wound. Allergic/Immunologic: Negative. Neurological: Negative. Hematological: Negative. Psychiatric/Behavioral: Negative. Vitals:    06/10/22 2226   BP: (!) 196/91   Pulse: (!) 108   Resp: 16   Temp: 98 °F (36.7 °C)   SpO2: 97%   Weight: 131.5 kg (290 lb)   Height: 5' 11\" (1.803 m)            Physical Exam  Vitals and nursing note reviewed. Constitutional:       Appearance: Normal appearance. He is normal weight. HENT:      Head: Normocephalic and atraumatic. Eyes:      Extraocular Movements: Extraocular movements intact. Conjunctiva/sclera: Conjunctivae normal.      Pupils: Pupils are equal, round, and reactive to light. Cardiovascular:      Rate and Rhythm: Normal rate and regular rhythm. Pulses: Normal pulses. Heart sounds: Normal heart sounds. Pulmonary:      Effort: Pulmonary effort is normal.      Breath sounds: Normal breath sounds. Abdominal:      General: Abdomen is flat. Bowel sounds are normal.      Palpations: Abdomen is soft. Musculoskeletal:         General: Normal range of motion. Cervical back: Normal range of motion and neck supple. Skin:     General: Skin is warm and dry. Comments: 2 cm laceration of the left anterior chest at the level of 3rd intercostal space. Neurological:      General: No focal deficit present.       Mental Status: He is alert and oriented to person, place, and time. Psychiatric:         Mood and Affect: Mood normal.         Behavior: Behavior normal.          MDM       Wound Repair    Date/Time: 6/11/2022 1:49 AM  Performed by: attendingPreparation: skin prepped with Betadine  Location details: chest  Wound length:2.5 cm or less  Anesthesia: local infiltration    Anesthesia:  Local Anesthetic: lidocaine 1% without epinephrine  Anesthetic total: 2 mL  Foreign bodies: no foreign bodies  Debridement: none  Skin closure: Prolene  Number of sutures: 4  Technique: simple  Approximation: close  My total time at bedside, performing this procedure was 1-15 minutes.

## 2022-06-29 ENCOUNTER — HOSPITAL ENCOUNTER (EMERGENCY)
Age: 37
Discharge: HOME | End: 2022-06-29
Attending: EMERGENCY MEDICINE
Payer: COMMERCIAL

## 2022-06-29 VITALS
OXYGEN SATURATION: 97 % | SYSTOLIC BLOOD PRESSURE: 129 MMHG | DIASTOLIC BLOOD PRESSURE: 92 MMHG | TEMPERATURE: 98.6 F | RESPIRATION RATE: 18 BRPM | HEART RATE: 91 BPM

## 2022-06-29 DIAGNOSIS — G43.909 MIGRAINE WITHOUT STATUS MIGRAINOSUS, NOT INTRACTABLE, UNSPECIFIED MIGRAINE TYPE: Primary | ICD-10-CM

## 2022-06-29 PROCEDURE — 99284 EMERGENCY DEPT VISIT MOD MDM: CPT

## 2022-06-29 PROCEDURE — 96372 THER/PROPH/DIAG INJ SC/IM: CPT

## 2022-06-29 PROCEDURE — 74011250636 HC RX REV CODE- 250/636: Performed by: EMERGENCY MEDICINE

## 2022-06-29 RX ORDER — DIPHENHYDRAMINE HYDROCHLORIDE 50 MG/ML
25 INJECTION, SOLUTION INTRAMUSCULAR; INTRAVENOUS
Status: COMPLETED | OUTPATIENT
Start: 2022-06-29 | End: 2022-06-29

## 2022-06-29 RX ORDER — METOCLOPRAMIDE HYDROCHLORIDE 5 MG/ML
10 INJECTION INTRAMUSCULAR; INTRAVENOUS
Status: COMPLETED | OUTPATIENT
Start: 2022-06-29 | End: 2022-06-29

## 2022-06-29 RX ADMIN — METOCLOPRAMIDE HYDROCHLORIDE 10 MG: 5 INJECTION INTRAMUSCULAR; INTRAVENOUS at 02:49

## 2022-06-29 RX ADMIN — METHYLPREDNISOLONE SODIUM SUCCINATE 125 MG: 125 INJECTION, POWDER, FOR SOLUTION INTRAMUSCULAR; INTRAVENOUS at 02:49

## 2022-06-29 RX ADMIN — DIPHENHYDRAMINE HYDROCHLORIDE 25 MG: 50 INJECTION, SOLUTION INTRAMUSCULAR; INTRAVENOUS at 02:49

## 2022-06-29 NOTE — ED TRIAGE NOTES
Pt reporting migraine since the morning of 6/28/22 with photosensitivity and bilateral fingers tingling. Reports he ran out of his bp meds Friday and just got it refilled yesterday. States he took a bp pill, BC and aspirin about 1 hour prior to arrival without relief.  Denies n/v

## 2022-06-29 NOTE — Clinical Note
[address]  [facility] EMERGENCY DEPT  [id number]  [phone]    Work/School Note    Date: 6/29/2022    To Whom It May concern:      Terra Del Toro was seen and treated today in the emergency room by the following provider(s):  Attending Provider: Monroe Gonsalez MD.      Terra Del Toro is excused from work/school on 06/29/22. He is clear to return to work/school on 06/30/22.         Sincerely,          Vannessa Gonzalez MD

## 2022-06-29 NOTE — ED PROVIDER NOTES
EMERGENCY DEPARTMENT HISTORY AND PHYSICAL EXAM  ?    Date: 6/29/2022  Patient Name: Bal Mims    History of Presenting Illness    No chief complaint on file. History Provided By: Patient    HPI: Bal Mims, 30 y.o. male with a past medical history significant for kidney stones and migraine headaches presents to the ED with cc of headache on the right eye started this morning. He has associated photophobia and tingling in both hands. He denies fever. There are no other complaints, changes, or physical findings at this time. PCP: MATHIEU Hwang        Past History    Past Medical History:  Past Medical History:  No date: Kidney stone    Past Surgical History:  Past Surgical History:  01/20/2021: HX LITHOTRIPSY     Comment:  Extracorporeal shockwave lithotripsy  No date: HX ORTHOPAEDIC; Right     Comment:  Thumb reattachment  No date: HX SHOULDER ARTHROSCOPY    Family History:  Review of patient's family history indicates:  Problem: No Known Problems     Relation: Mother         Age of Onset: (Not Specified)  Problem: No Known Problems     Relation: Father         Age of Onset: (Not Specified)      Social History:  Social History   Tobacco Use     Smoking status: Former Smoker       Years: 3.00     Smokeless tobacco: Never Used     Tobacco comment: pt states quit several years    Vaping Use     Vaping Use: Former       Substances: CBD       Devices: Disposable   Alcohol use: Yes     Alcohol/week: 2.0 standard drinks     Types: 2 Cans of beer per week     Comment: occasionally   Drug use: Not Currently     Types: Marijuana     Comment: pt states last used on 1/1/2021      Allergies:  No Known Allergies      Review of Systems  @[facility]@    [facility] Exam  @[facility]    Diagnostic Study Results    Labs -  No results found for this or any previous visit (from the past 12 hour(s)).     Radiologic Studies -   No orders to display  CT Results  (Last 48 hours)   None     CXR Results  (Last 48 hours)   None       Medical Decision Making and ED Course  I am the first provider for this patient. I reviewed the vital signs, available nursing notes, past medical history, past surgical history, family history and social history. Vital Signs-Reviewed the patient's vital signs. Empty flowsheet group. Records Reviewed: Nursing Notes    Provider Notes (Medical Decision Making):   Patient with history of migraine headaches presents with right-sided headache. The patient presents with differential diagnosis of migraine headache, trigeminal neuralgia, glaucoma. ED Course:   Headache is much improved with treatment. Initial assessment performed. The patients presenting problems have been discussed, and they are in agreement with the care plan formulated and outlined with them. I have encouraged them to ask questions as they arise throughout their visit. Disposition  Discharged        DISCHARGE PLAN:  1. There are no discharge medications for this patient. 2. Follow-up Information    None    3. Return to ED if worse     Diagnosis    Clinical Impression: Migraine headache attestations:    Sarai Gregory MD    Please note that this dictation was completed with [employer], the computer voice recognition software. Quite often unanticipated grammatical, syntax, homophones, and other interpretive errors are inadvertently transcribed by the computer software. Please disregard these errors. Please excuse any errors that have escaped final proofreading. Thank you.    ?            Past Medical History:   Diagnosis Date    Kidney stone        Past Surgical History:   Procedure Laterality Date    HX LITHOTRIPSY  01/20/2021    Extracorporeal shockwave lithotripsy    HX ORTHOPAEDIC Right     Thumb reattachment    HX SHOULDER ARTHROSCOPY           Family History:   Problem Relation Age of Onset    No Known Problems Mother     No Known Problems Father        Social History     Socioeconomic History    Marital status:      Spouse name: Not on file    Number of children: Not on file    Years of education: Not on file    Highest education level: Not on file   Occupational History    Not on file   Tobacco Use    Smoking status: Former Smoker     Years: 3.00    Smokeless tobacco: Never Used    Tobacco comment: pt states quit several years    Vaping Use    Vaping Use: Former    Substances: CBD    Devices: Disposable   Substance and Sexual Activity    Alcohol use: Yes     Alcohol/week: 2.0 standard drinks     Types: 2 Cans of beer per week     Comment: occasionally    Drug use: Not Currently     Types: Marijuana     Comment: pt states last used on 1/1/2021    Sexual activity: Yes     Partners: Female     Birth control/protection: Condom   Other Topics Concern    Not on file   Social History Narrative    Not on file     Social Determinants of Health     Financial Resource Strain:     Difficulty of Paying Living Expenses: Not on file   Food Insecurity:     Worried About Running Out of Food in the Last Year: Not on file    Iman of Food in the Last Year: Not on file   Transportation Needs:     Lack of Transportation (Medical): Not on file    Lack of Transportation (Non-Medical):  Not on file   Physical Activity:     Days of Exercise per Week: Not on file    Minutes of Exercise per Session: Not on file   Stress:     Feeling of Stress : Not on file   Social Connections:     Frequency of Communication with Friends and Family: Not on file    Frequency of Social Gatherings with Friends and Family: Not on file    Attends Muslim Services: Not on file    Active Member of Clubs or Organizations: Not on file    Attends Club or Organization Meetings: Not on file    Marital Status: Not on file   Intimate Partner Violence:     Fear of Current or Ex-Partner: Not on file    Emotionally Abused: Not on file    Physically Abused: Not on file    Sexually Abused: Not on file   Housing Stability:     Unable to Pay for Housing in the Last Year: Not on file    Number of Places Lived in the Last Year: Not on file    Unstable Housing in the Last Year: Not on file         ALLERGIES: Patient has no known allergies. Review of Systems   Constitutional: Negative. HENT: Negative. Eyes: Positive for photophobia. Respiratory: Negative. Cardiovascular: Negative. Gastrointestinal: Negative. Endocrine: Negative. Genitourinary: Negative. Musculoskeletal: Negative. Allergic/Immunologic: Negative. Neurological: Positive for numbness and headaches. Hematological: Negative. Psychiatric/Behavioral: Negative. Vitals:    06/29/22 0234   BP: (!) 158/101   Pulse: 91   Resp: 18   Temp: 98.6 °F (37 °C)   SpO2: 97%            Physical Exam  Vitals and nursing note reviewed. Constitutional:       Appearance: Normal appearance. He is obese. HENT:      Head: Normocephalic and atraumatic. Right Ear: External ear normal.      Left Ear: External ear normal.      Nose: Nose normal.      Mouth/Throat:      Mouth: Mucous membranes are moist.      Pharynx: Oropharynx is clear. Eyes:      Extraocular Movements: Extraocular movements intact. Conjunctiva/sclera: Conjunctivae normal.      Pupils: Pupils are equal, round, and reactive to light. Cardiovascular:      Rate and Rhythm: Normal rate and regular rhythm. Pulses: Normal pulses. Heart sounds: Normal heart sounds. Pulmonary:      Effort: Pulmonary effort is normal.      Breath sounds: Normal breath sounds. Abdominal:      General: Abdomen is flat. Bowel sounds are normal.      Palpations: Abdomen is soft. Musculoskeletal:         General: Normal range of motion. Cervical back: Normal range of motion and neck supple. Skin:     General: Skin is warm and dry. Neurological:      General: No focal deficit present. Mental Status: He is alert and oriented to person, place, and time.    Psychiatric: Mood and Affect: Mood normal.         Behavior: Behavior normal.          MDM       Procedures

## 2022-07-05 ENCOUNTER — TRANSCRIBE ORDER (OUTPATIENT)
Dept: SCHEDULING | Age: 37
End: 2022-07-05

## 2022-07-05 DIAGNOSIS — N28.89 URETERAL FISTULA: Primary | ICD-10-CM

## 2023-02-16 ENCOUNTER — HOSPITAL ENCOUNTER (EMERGENCY)
Age: 38
Discharge: HOME | End: 2023-02-16
Attending: EMERGENCY MEDICINE
Payer: COMMERCIAL

## 2023-02-16 VITALS
WEIGHT: 304 LBS | DIASTOLIC BLOOD PRESSURE: 84 MMHG | HEIGHT: 71 IN | BODY MASS INDEX: 42.56 KG/M2 | TEMPERATURE: 101 F | HEART RATE: 105 BPM | SYSTOLIC BLOOD PRESSURE: 135 MMHG | RESPIRATION RATE: 18 BRPM | OXYGEN SATURATION: 95 %

## 2023-02-16 DIAGNOSIS — B34.9 VIRAL ILLNESS: Primary | ICD-10-CM

## 2023-02-16 DIAGNOSIS — K52.9 GASTROENTERITIS: ICD-10-CM

## 2023-02-16 LAB
ALBUMIN SERPL-MCNC: 3.9 G/DL (ref 3.5–5)
ALBUMIN/GLOB SERPL: 0.9 (ref 1.1–2.2)
ALP SERPL-CCNC: 107 U/L (ref 45–117)
ALT SERPL-CCNC: 76 U/L (ref 12–78)
ANION GAP SERPL CALC-SCNC: 7 MMOL/L (ref 5–15)
AST SERPL W P-5'-P-CCNC: 55 U/L (ref 15–37)
BASOPHILS # BLD: 0 K/UL (ref 0–0.2)
BASOPHILS NFR BLD: 0 % (ref 0–2.5)
BILIRUB SERPL-MCNC: 0.4 MG/DL (ref 0.2–1)
BUN SERPL-MCNC: 15 MG/DL (ref 6–20)
BUN/CREAT SERPL: 13 (ref 12–20)
CA-I BLD-MCNC: 9.6 MG/DL (ref 8.5–10.1)
CHLORIDE SERPL-SCNC: 103 MMOL/L (ref 97–108)
CO2 SERPL-SCNC: 29 MMOL/L (ref 21–32)
CREAT SERPL-MCNC: 1.2 MG/DL (ref 0.7–1.3)
EOSINOPHIL # BLD: 0.1 K/UL (ref 0–0.7)
EOSINOPHIL NFR BLD: 2 % (ref 0.9–2.9)
ERYTHROCYTE [DISTWIDTH] IN BLOOD BY AUTOMATED COUNT: 14.5 % (ref 11.5–14.5)
FLUAV RNA SPEC QL NAA+PROBE: NOT DETECTED
FLUBV RNA SPEC QL NAA+PROBE: NOT DETECTED
GLOBULIN SER CALC-MCNC: 4.3 G/DL (ref 2–4)
GLUCOSE SERPL-MCNC: 118 MG/DL (ref 65–100)
HCT VFR BLD AUTO: 41.8 % (ref 41–53)
HGB BLD-MCNC: 14.5 G/DL (ref 13.5–17.5)
LYMPHOCYTES # BLD: 1 K/UL (ref 1–4.8)
LYMPHOCYTES NFR BLD: 11 % (ref 20.5–51.1)
MCH RBC QN AUTO: 29.8 PG (ref 31–34)
MCHC RBC AUTO-ENTMCNC: 34.8 G/DL (ref 31–36)
MCV RBC AUTO: 85.8 FL (ref 80–100)
MONOCYTES # BLD: 0.4 K/UL (ref 0.2–2.4)
MONOCYTES NFR BLD: 5 % (ref 1.7–9.3)
NEUTS SEG # BLD: 6.9 K/UL (ref 1.8–7.7)
NEUTS SEG NFR BLD: 82 % (ref 42–75)
NRBC # BLD: 0.01 K/UL
NRBC BLD-RTO: 0.1 PER 100 WBC
PLATELET # BLD AUTO: 426 K/UL (ref 150–400)
PMV BLD AUTO: 6.5 FL (ref 6.5–11.5)
POTASSIUM SERPL-SCNC: 5.1 MMOL/L (ref 3.5–5.1)
PROT SERPL-MCNC: 8.2 G/DL (ref 6.4–8.2)
RBC # BLD AUTO: 4.87 M/UL (ref 4.5–5.9)
SARS-COV-2 RNA RESP QL NAA+PROBE: NOT DETECTED
SODIUM SERPL-SCNC: 139 MMOL/L (ref 136–145)
WBC # BLD AUTO: 8.5 K/UL (ref 4.4–11.3)

## 2023-02-16 PROCEDURE — 74011250637 HC RX REV CODE- 250/637: Performed by: EMERGENCY MEDICINE

## 2023-02-16 PROCEDURE — 85025 COMPLETE CBC W/AUTO DIFF WBC: CPT

## 2023-02-16 PROCEDURE — 80053 COMPREHEN METABOLIC PANEL: CPT

## 2023-02-16 PROCEDURE — 99283 EMERGENCY DEPT VISIT LOW MDM: CPT

## 2023-02-16 PROCEDURE — 87636 SARSCOV2 & INF A&B AMP PRB: CPT

## 2023-02-16 RX ORDER — ACETAMINOPHEN 500 MG
1000 TABLET ORAL
Status: COMPLETED | OUTPATIENT
Start: 2023-02-16 | End: 2023-02-16

## 2023-02-16 RX ADMIN — ACETAMINOPHEN 1000 MG: 500 TABLET ORAL at 21:04

## 2023-02-16 NOTE — Clinical Note
[address]  [facility] EMERGENCY DEPT  [phone]  [phone]    Work/School Note    Date: 2/16/2023    To Whom It May concern:    Samuel Engel was seen and treated today in the emergency room by the following provider(s):  Attending Provider: Sheree Cheng MD.      Samuel Engel is excused from work/school on 2/16/2023 through 2/18/2023. He is medically clear to return to work/school on 2/19/2023.          Sincerely,          Fidelia Cruz MD

## 2023-02-16 NOTE — Clinical Note
[address]  [facility] EMERGENCY DEPT  [phone]  [phone]    Work/School Note    Date: 2/16/2023    To Whom It May concern:    Jose Francisco Harrell was seen and treated today in the emergency room by the following provider(s):  Attending Provider: Jessy Lin MD.      Jose Francisco Harrell is excused from work/school on 2/16/2023 through 2/18/2023. He is medically clear to return to work/school on 2/19/2023.          Sincerely,          Tomas Cruz RN

## 2023-02-17 NOTE — ED NOTES
Pt receptive to discharge instructions regarding dx, fluid and dietary recommendations, and follow up with PCP for further eval and tx of persistent symptoms. NAD noted upon discharge. Pt ambulatory without difficulty.

## 2023-02-17 NOTE — ED PROVIDER NOTES
[facility] EMERGENCY DEPT  EMERGENCY DEPARTMENT HISTORY AND PHYSICAL EXAM      Date: 2/16/2023  Patient Name: Carloz Haider  MRN: [id number]  [place]: 1985  Date of evaluation: 2/16/2023  Provider: Gloria Evans MD   Note Started: 9:10 PM 2/16/23    HISTORY OF PRESENT ILLNESS     Chief Complaint   Patient presents with    Flu Like Symptoms       History Provided By: patient    HPI: Carloz Haider, 30 y.o. male flu like, headaches, fatigue, fevers, chills, diarrhea, chills and nausea for 2 days. PAST MEDICAL HISTORY   Past Medical History:  Past Medical History:   Diagnosis Date    HTN (hypertension)     Kidney stone        Past Surgical History:  Past Surgical History:   Procedure Laterality Date    HX LITHOTRIPSY  01/20/2021    Extracorporeal shockwave lithotripsy    HX ORTHOPAEDIC Right     Thumb reattachment    HX SHOULDER ARTHROSCOPY         Family History:  Family History   Problem Relation Age of Onset    No Known Problems Mother     No Known Problems Father        Social History:  Social History     Tobacco Use    Smoking status: Former     Years: 3.00     Types: Cigarettes    Smokeless tobacco: Never    Tobacco comments:     pt states quit several years    Vaping Use    Vaping Use: Former    Substances: CBD    Devices: Disposable   Substance Use Topics    Alcohol use: Yes     Alcohol/week: 2.0 standard drinks     Types: 2 Cans of beer per week     Comment: occasionally    Drug use: Not Currently     Types: Marijuana     Comment: pt states last used on 1/1/2021       Allergies:  No Known Allergies    PCP: MATHIEU Delgado    Current Meds:   Previous Medications    No medications on file       REVIEW OF SYSTEMS   Review of Systems   Constitutional:  Positive for fatigue and fever. HENT: Negative. Eyes: Negative. Respiratory: Negative. Cardiovascular: Negative. Gastrointestinal:  Positive for diarrhea and nausea. Endocrine: Negative. Genitourinary: Negative.     Musculoskeletal: Negative. Skin: Negative. Allergic/Immunologic: Negative. Neurological: Negative. Hematological: Negative. Psychiatric/Behavioral: Negative. All other systems reviewed and are negative. Positives and Pertinent negatives as per HPI. PHYSICAL EXAM     ED Triage Vitals [02/16/23 2025]   ED Encounter Vitals Group      /83      Pulse (Heart Rate) (!) 113      Resp Rate 18      Temp (!) 101 °F (38.3 °C)      Temp src       O2 Sat (%) 98 %      Weight 304 lb      Height 5' 11\"      Physical Exam  Vitals and nursing note reviewed. Constitutional:       Appearance: Normal appearance. HENT:      Head: Normocephalic. Nose: Nose normal.      Mouth/Throat:      Mouth: Mucous membranes are moist.   Eyes:      Pupils: Pupils are equal, round, and reactive to light. Cardiovascular:      Rate and Rhythm: Normal rate. Pulmonary:      Effort: Pulmonary effort is normal.   Abdominal:      General: Abdomen is flat. Musculoskeletal:         General: No tenderness. Normal range of motion. Cervical back: Normal range of motion. Skin:     General: Skin is warm. Neurological:      General: No focal deficit present. Mental Status: He is alert and oriented to person, place, and time. Psychiatric:         Mood and Affect: Mood normal.       SCREENINGS               No data recorded      LAB, EKG AND DIAGNOSTIC RESULTS   Labs:  Recent Results (from the past 12 hour(s))   COVID-19 WITH INFLUENZA A/B    Collection Time: 02/16/23  8:15 PM   Result Value Ref Range    SARS-CoV-2 by PCR Not Detected Not Detected      Influenza A by PCR Not Detected Not Detected      Influenza B by PCR Not Detected Not Detected         EKG: Initial EKG interpreted by me.  Shows     Radiologic Studies:  Non-plain film images such as CT, Ultrasound and MRI are read by the radiologist. Plain radiographic images are visualized and preliminarily interpreted by the ED Provider with the below findings:        Interpretation per the Radiologist below, if available at the time of this note:  No results found. PROCEDURES   Unless otherwise noted below, none. Performed by: Marita Larson MD   Procedures      CRITICAL CARE TIME       ED COURSE and DIFFERENTIAL DIAGNOSIS/MDM   Vitals:    Vitals:    02/16/23 2025 02/16/23 2027   BP: 139/83    Pulse: (!) 113    Resp: 18    Temp: (!) 101 °F (38.3 °C)    SpO2: 98% 98%   Weight: 137.9 kg (304 lb)    Height: 5' 11\" (1.803 m)         Patient was given the following medications:  Medications   acetaminophen (TYLENOL) tablet 1,000 mg (1,000 mg Oral Given 2/16/23 2104)       CONSULTS: (Who and What was discussed)  None     Chronic Conditions:   Social Determinants affecting Dx or Tx: None  Counseling: TOBACCO COUNSELING: Upon evaluation, pt expressed that they are a current tobacco user. For approximately 10 minutes, pt has been counseled on the dangers of smoking and was encouraged to quit as soon as possible in order to decrease further risks to their health. Pt has conveyed their understanding of the risks involved should they continue to use tobacco products., ALCOHOL/SUBSTANCE ABUSE COUNSELING: Upon evaluation, pt endorsed recent alcohol/illicit drug use. For approximately 15 minutes, pt has been counseled on the dangers of alcohol and illicit drug use on their health, and they were encouraged to quit as soon as possible in order to decrease further risks to their health. Pt has conveyed their understanding of the risks involved should they continue to use these products. , and HYPERTENSION COUNSELING: Education was provided to the patient today regarding their hypertension. Patient is made aware of their elevated blood pressure and is instructed to follow up this week with their Primary Care for a recheck. Patient is counseled regarding consequences of chronic, uncontrolled hypertension including kidney disease, heart disease, stroke or even death.  Patient states their understanding and agrees to follow up this week. Additionally, during their visit, I discussed sodium restriction, maintaining ideal body weight and regular exercise program as physiologic means to achieve blood pressure control. The patient will strive towards this. Records Reviewed (source and summary of external notes): Nursing notes    CC/HPI Summary, DDx, ED Course, and Reassessment:   DDX: influ, covid, viral illness, gastroenteritis, electrolyst imbalance     29 yo with flu like symptoms  Labs obtained CDC, CMP,Covid, influ WNL  Discussed finding with patient and recommendations. [place] fluid, bed rest, tylenol or Motrin as directed for fevers. Follow-up with primary care doctor or Return to Ed if symptoms worsen  Disposition Considerations (Tests not done, Shared Decision Making, Pt Expectation of Test or Treatment.):      FINAL IMPRESSION       ICD-10-CM ICD-9-CM    1. Viral illness  B34.9 079.99       2. Gastroenteritis  K52.9 558.9             DISPOSITION/PLAN       Discharge Note: The patient is stable for discharge home. The signs, symptoms, diagnosis, and discharge instructions have been discussed, understanding conveyed, and agreed upon. The patient is to follow up as recommended or return to ER should their symptoms worsen. PATIENT REFERRED TO:  Follow-up Information    None           DISCHARGE MEDICATIONS:  There are no discharge medications for this patient. DISCONTINUED MEDICATIONS:  There are no discharge medications for this patient. I am the Primary Clinician of Record: Marita Larson MD (electronically signed)    (Please note that parts of this dictation were completed with voice recognition software. Quite often unanticipated grammatical, syntax, homophones, and other interpretive errors are inadvertently transcribed by the computer software. Please disregards these errors.  Please excuse any errors that have escaped final proofreading.)

## 2023-02-17 NOTE — ED TRIAGE NOTES
Pt reports weakness, diarrhea and nausea today. Fever noted 101 upon triage. Pt reports hx HTN and kidney stones.

## 2023-02-17 NOTE — DISCHARGE INSTRUCTIONS
[place] fluid, bed rest, tylenol or Motrin as directed for fevers.   Follow-up with primary care doctor or Return to Ed if symptoms worsen

## 2023-06-07 ENCOUNTER — HOSPITAL ENCOUNTER (EMERGENCY)
Facility: HOSPITAL | Age: 38
Discharge: HOME | End: 2023-06-07
Attending: EMERGENCY MEDICINE
Payer: COMMERCIAL

## 2023-06-07 VITALS
TEMPERATURE: 98.5 F | OXYGEN SATURATION: 100 % | BODY MASS INDEX: 44.1 KG/M2 | RESPIRATION RATE: 20 BRPM | SYSTOLIC BLOOD PRESSURE: 157 MMHG | WEIGHT: 315 LBS | HEIGHT: 71 IN | HEART RATE: 90 BPM | DIASTOLIC BLOOD PRESSURE: 121 MMHG

## 2023-06-07 DIAGNOSIS — I10 UNCONTROLLED HYPERTENSION: ICD-10-CM

## 2023-06-07 DIAGNOSIS — G43.019 INTRACTABLE MIGRAINE WITHOUT AURA AND WITHOUT STATUS MIGRAINOSUS: Primary | ICD-10-CM

## 2023-06-07 PROCEDURE — 6370000000 HC RX 637 (ALT 250 FOR IP): Performed by: EMERGENCY MEDICINE

## 2023-06-07 RX ORDER — AMLODIPINE BESYLATE 5 MG/1
TABLET ORAL
COMMUNITY
Start: 2023-06-07

## 2023-06-07 RX ORDER — PREDNISONE 20 MG/1
60 TABLET ORAL
Status: COMPLETED | OUTPATIENT
Start: 2023-06-07 | End: 2023-06-07

## 2023-06-07 RX ORDER — BUTALBITAL, ACETAMINOPHEN AND CAFFEINE 50; 325; 40 MG/1; MG/1; MG/1
2 TABLET ORAL
Status: COMPLETED | OUTPATIENT
Start: 2023-06-07 | End: 2023-06-07

## 2023-06-07 RX ORDER — BUTALBITAL, ACETAMINOPHEN AND CAFFEINE 300; 40; 50 MG/1; MG/1; MG/1
1 CAPSULE ORAL EVERY 6 HOURS PRN
Qty: 20 CAPSULE | Refills: 0 | Status: SHIPPED | OUTPATIENT
Start: 2023-06-07 | End: 2023-06-12

## 2023-06-07 RX ORDER — CLONIDINE HYDROCHLORIDE 0.1 MG/1
0.2 TABLET ORAL
Status: COMPLETED | OUTPATIENT
Start: 2023-06-07 | End: 2023-06-07

## 2023-06-07 RX ORDER — PREDNISONE 20 MG/1
TABLET ORAL
Qty: 12 TABLET | Refills: 1 | Status: SHIPPED | OUTPATIENT
Start: 2023-06-07

## 2023-06-07 RX ORDER — IBUPROFEN 800 MG/1
800 TABLET ORAL
Status: COMPLETED | OUTPATIENT
Start: 2023-06-07 | End: 2023-06-07

## 2023-06-07 RX ADMIN — BUTALBITAL, ACETAMINOPHEN, AND CAFFEINE 2 TABLET: 50; 325; 40 TABLET ORAL at 21:03

## 2023-06-07 RX ADMIN — PREDNISONE 60 MG: 20 TABLET ORAL at 21:03

## 2023-06-07 RX ADMIN — CLONIDINE HYDROCHLORIDE 0.2 MG: 0.1 TABLET ORAL at 21:03

## 2023-06-07 RX ADMIN — IBUPROFEN 800 MG: 800 TABLET, FILM COATED ORAL at 21:03

## 2023-06-07 ASSESSMENT — PAIN SCALES - GENERAL: PAINLEVEL_OUTOF10: 7

## 2023-06-07 ASSESSMENT — PAIN - FUNCTIONAL ASSESSMENT: PAIN_FUNCTIONAL_ASSESSMENT: 0-10

## 2023-06-07 NOTE — ED TRIAGE NOTES
Pt states that since yesterday he has been having a headache rated 7/10 a this time and took a BC at 1900 tonight. He has been without HTN meds x2days due to the pharmacy not being able to refill yet. Pt went to his PCP this afternoon and when they checked his blood pressure he was advised to come to the ED to be evaluated. Pt denies any other issues or pains and no deficits noted.

## 2023-06-08 NOTE — ED PROVIDER NOTES
capsule by mouth every 6 hours as needed for Headaches or Migraine     predniSONE 20 MG tablet  Commonly known as: DELTASONE  Take 3 tablets once a day for 2 days, then take 2 tablets once a day for 2 days, then take 1 take once a day for 2 days            ASK your doctor about these medications      amLODIPine 5 MG tablet  Commonly known as: Zoe Murrell               Where to Get Your Medications        These medications were sent to [address] [place] [address], [address] [place] [id number]      Phone: [phone]   butalbital-APAP-caffeine -40 MG Caps per capsule  predniSONE 20 MG tablet       2. [unfilled]  Return to ED if worse     Diagnosis     Clinical Impression:   1. Intractable migraine without aura and without status migrainosus    2. Uncontrolled hypertension        Please note that this dictation was completed with [employer], the [email or web address] voice recognition software. Quite often unanticipated grammatical, syntax, homophones, and other interpretive errors are inadvertently transcribed by the computer software. Please disregard these errors. Please excuse any errors that have escaped final proofreading. Thank you.        Nano Gonzalez MD  06/07/23 5428

## 2023-06-08 NOTE — ED NOTES
Patient stable at time of discharge. Reviewed discharge instructions and education. Patient verbalized understanding. Patient states no questions at this time.       Martinez Ennis RN  06/07/23 6745

## 2023-07-25 ENCOUNTER — HOSPITAL ENCOUNTER (EMERGENCY)
Facility: HOSPITAL | Age: 38
Discharge: HOME | End: 2023-07-25
Attending: EMERGENCY MEDICINE
Payer: COMMERCIAL

## 2023-07-25 VITALS
OXYGEN SATURATION: 99 % | BODY MASS INDEX: 44.1 KG/M2 | SYSTOLIC BLOOD PRESSURE: 148 MMHG | RESPIRATION RATE: 19 BRPM | TEMPERATURE: 97.9 F | WEIGHT: 315 LBS | HEART RATE: 88 BPM | DIASTOLIC BLOOD PRESSURE: 79 MMHG | HEIGHT: 71 IN

## 2023-07-25 DIAGNOSIS — M76.32 ILIOTIBIAL BAND TENDONITIS OF LEFT SIDE: Primary | ICD-10-CM

## 2023-07-25 PROCEDURE — 6360000002 HC RX W HCPCS: Performed by: EMERGENCY MEDICINE

## 2023-07-25 PROCEDURE — 96372 THER/PROPH/DIAG INJ SC/IM: CPT

## 2023-07-25 PROCEDURE — 99284 EMERGENCY DEPT VISIT MOD MDM: CPT

## 2023-07-25 PROCEDURE — 2580000003 HC RX 258: Performed by: EMERGENCY MEDICINE

## 2023-07-25 RX ORDER — KETOROLAC TROMETHAMINE 30 MG/ML
60 INJECTION, SOLUTION INTRAMUSCULAR; INTRAVENOUS ONCE
Status: COMPLETED | OUTPATIENT
Start: 2023-07-25 | End: 2023-07-25

## 2023-07-25 RX ORDER — IBUPROFEN 800 MG/1
800 TABLET ORAL EVERY 8 HOURS PRN
Qty: 30 TABLET | Refills: 0 | Status: SHIPPED | OUTPATIENT
Start: 2023-07-25 | End: 2023-08-04

## 2023-07-25 RX ORDER — PREDNISONE 20 MG/1
20 TABLET ORAL DAILY
Qty: 5 TABLET | Refills: 0 | Status: SHIPPED | OUTPATIENT
Start: 2023-07-25 | End: 2023-07-30

## 2023-07-25 RX ADMIN — WATER 125 MG: 1 INJECTION INTRAMUSCULAR; INTRAVENOUS; SUBCUTANEOUS at 11:38

## 2023-07-25 RX ADMIN — KETOROLAC TROMETHAMINE 60 MG: 60 INJECTION, SOLUTION INTRAMUSCULAR at 11:37

## 2023-07-25 NOTE — ED TRIAGE NOTES
Patient reports that yesterday when it started raining he started to have lower back pain with left leg pain patient denies injury to same

## 2023-07-25 NOTE — ED NOTES
Pt given discharge and follow up instructions. No further questions at this time. Pt given eudcation on pain management.      Violetta Anthony RN  07/25/23 0469

## 2023-07-25 NOTE — ED PROVIDER NOTES
Medication List          I am the Primary Clinician of Record: Penelope Kramer MD (electronically signed)    (Please note that parts of this dictation were completed with voice recognition software. Quite often unanticipated grammatical, syntax, homophones, and other interpretive errors are inadvertently transcribed by the computer software. Please disregards these errors.  Please excuse any errors that have escaped final proofreading.)     Penelope Kramer MD  07/25/23 0322

## 2023-08-28 ENCOUNTER — HOSPITAL ENCOUNTER (EMERGENCY)
Facility: HOSPITAL | Age: 38
Discharge: HOME | End: 2023-08-29
Attending: EMERGENCY MEDICINE
Payer: COMMERCIAL

## 2023-08-28 DIAGNOSIS — J03.80 ACUTE TONSILLITIS DUE TO OTHER SPECIFIED ORGANISMS: Primary | ICD-10-CM

## 2023-08-28 LAB
DEPRECATED S PYO AG THROAT QL EIA: NEGATIVE
FLUAV RNA SPEC QL NAA+PROBE: NOT DETECTED
FLUBV RNA SPEC QL NAA+PROBE: NOT DETECTED
SARS-COV-2 RNA RESP QL NAA+PROBE: NOT DETECTED

## 2023-08-28 PROCEDURE — 87070 CULTURE OTHR SPECIMN AEROBIC: CPT

## 2023-08-28 PROCEDURE — 99284 EMERGENCY DEPT VISIT MOD MDM: CPT

## 2023-08-28 PROCEDURE — 87880 STREP A ASSAY W/OPTIC: CPT

## 2023-08-28 PROCEDURE — 87636 SARSCOV2 & INF A&B AMP PRB: CPT

## 2023-08-28 RX ORDER — HYDROCODONE BITARTRATE AND ACETAMINOPHEN 5; 325 MG/1; MG/1
1 TABLET ORAL EVERY 6 HOURS PRN
Qty: 12 TABLET | Refills: 0 | Status: ON HOLD | OUTPATIENT
Start: 2023-08-28 | End: 2023-08-31 | Stop reason: HOSPADM

## 2023-08-28 RX ORDER — AMOXICILLIN AND CLAVULANATE POTASSIUM 875; 125 MG/1; MG/1
1 TABLET, FILM COATED ORAL 2 TIMES DAILY
Qty: 14 TABLET | Refills: 0 | Status: ON HOLD | OUTPATIENT
Start: 2023-08-28 | End: 2023-08-31 | Stop reason: HOSPADM

## 2023-08-28 ASSESSMENT — PAIN - FUNCTIONAL ASSESSMENT: PAIN_FUNCTIONAL_ASSESSMENT: 0-10

## 2023-08-28 ASSESSMENT — LIFESTYLE VARIABLES
HOW OFTEN DO YOU HAVE A DRINK CONTAINING ALCOHOL: NEVER
HOW MANY STANDARD DRINKS CONTAINING ALCOHOL DO YOU HAVE ON A TYPICAL DAY: PATIENT DOES NOT DRINK

## 2023-08-28 ASSESSMENT — PAIN SCALES - GENERAL: PAINLEVEL_OUTOF10: 0

## 2023-08-29 ENCOUNTER — APPOINTMENT (OUTPATIENT)
Facility: HOSPITAL | Age: 38
End: 2023-08-29
Payer: COMMERCIAL

## 2023-08-29 ENCOUNTER — HOSPITAL ENCOUNTER (OUTPATIENT)
Facility: HOSPITAL | Age: 38
Setting detail: OBSERVATION
Discharge: HOME | End: 2023-08-31
Attending: EMERGENCY MEDICINE | Admitting: HOSPITALIST
Payer: COMMERCIAL

## 2023-08-29 VITALS
RESPIRATION RATE: 16 BRPM | OXYGEN SATURATION: 100 % | HEART RATE: 78 BPM | WEIGHT: 315 LBS | SYSTOLIC BLOOD PRESSURE: 146 MMHG | TEMPERATURE: 97.8 F | BODY MASS INDEX: 44.1 KG/M2 | HEIGHT: 71 IN | DIASTOLIC BLOOD PRESSURE: 76 MMHG

## 2023-08-29 DIAGNOSIS — J02.9 PHARYNGITIS, UNSPECIFIED ETIOLOGY: Primary | ICD-10-CM

## 2023-08-29 DIAGNOSIS — J03.90 ACUTE TONSILLITIS, UNSPECIFIED ETIOLOGY: ICD-10-CM

## 2023-08-29 LAB
ANION GAP SERPL CALC-SCNC: 7 MMOL/L (ref 5–15)
BASOPHILS # BLD: 0 K/UL (ref 0–0.1)
BASOPHILS NFR BLD: 0 % (ref 0–1)
BUN SERPL-MCNC: 16 MG/DL (ref 6–20)
BUN/CREAT SERPL: 13 (ref 12–20)
CA-I BLD-MCNC: 10.2 MG/DL (ref 8.5–10.1)
CHLORIDE SERPL-SCNC: 107 MMOL/L (ref 97–108)
CO2 SERPL-SCNC: 26 MMOL/L (ref 21–32)
CREAT SERPL-MCNC: 1.24 MG/DL (ref 0.7–1.3)
CREAT SERPL-MCNC: 1.25 MG/DL (ref 0.7–1.3)
DIFFERENTIAL METHOD BLD: ABNORMAL
EOSINOPHIL # BLD: 0.1 K/UL (ref 0–0.4)
EOSINOPHIL NFR BLD: 1 % (ref 0–7)
ERYTHROCYTE [DISTWIDTH] IN BLOOD BY AUTOMATED COUNT: 14.5 % (ref 11.5–14.5)
GLUCOSE SERPL-MCNC: 127 MG/DL (ref 65–100)
HCT VFR BLD AUTO: 43.8 % (ref 36.6–50.3)
HGB BLD-MCNC: 14.3 G/DL (ref 12.1–17)
IMM GRANULOCYTES # BLD AUTO: 0 K/UL (ref 0–0.04)
IMM GRANULOCYTES NFR BLD AUTO: 0 % (ref 0–0.5)
LYMPHOCYTES # BLD: 1.7 K/UL (ref 0.8–3.5)
LYMPHOCYTES NFR BLD: 15 % (ref 12–49)
MCH RBC QN AUTO: 29.4 PG (ref 26–34)
MCHC RBC AUTO-ENTMCNC: 32.6 G/DL (ref 30–36.5)
MCV RBC AUTO: 89.9 FL (ref 80–99)
MONOCYTES # BLD: 0.4 K/UL (ref 0–1)
MONOCYTES NFR BLD: 4 % (ref 5–13)
NEUTS SEG # BLD: 9 K/UL (ref 1.8–8)
NEUTS SEG NFR BLD: 80 % (ref 32–75)
NRBC # BLD: 0 K/UL (ref 0–0.01)
NRBC BLD-RTO: 0 PER 100 WBC
PLATELET # BLD AUTO: 485 K/UL (ref 150–400)
PMV BLD AUTO: 9 FL (ref 8.9–12.9)
POTASSIUM SERPL-SCNC: 4.5 MMOL/L (ref 3.5–5.1)
RBC # BLD AUTO: 4.87 M/UL (ref 4.1–5.7)
SODIUM SERPL-SCNC: 140 MMOL/L (ref 136–145)
WBC # BLD AUTO: 11.2 K/UL (ref 4.1–11.1)

## 2023-08-29 PROCEDURE — 6360000002 HC RX W HCPCS

## 2023-08-29 PROCEDURE — 6360000004 HC RX CONTRAST MEDICATION: Performed by: EMERGENCY MEDICINE

## 2023-08-29 PROCEDURE — 80048 BASIC METABOLIC PNL TOTAL CA: CPT

## 2023-08-29 PROCEDURE — 96372 THER/PROPH/DIAG INJ SC/IM: CPT

## 2023-08-29 PROCEDURE — 2580000003 HC RX 258: Performed by: EMERGENCY MEDICINE

## 2023-08-29 PROCEDURE — 36415 COLL VENOUS BLD VENIPUNCTURE: CPT

## 2023-08-29 PROCEDURE — 96374 THER/PROPH/DIAG INJ IV PUSH: CPT

## 2023-08-29 PROCEDURE — 96375 TX/PRO/DX INJ NEW DRUG ADDON: CPT

## 2023-08-29 PROCEDURE — 6360000002 HC RX W HCPCS: Performed by: EMERGENCY MEDICINE

## 2023-08-29 PROCEDURE — 85025 COMPLETE CBC W/AUTO DIFF WBC: CPT

## 2023-08-29 PROCEDURE — 70491 CT SOFT TISSUE NECK W/DYE: CPT

## 2023-08-29 PROCEDURE — 6370000000 HC RX 637 (ALT 250 FOR IP): Performed by: EMERGENCY MEDICINE

## 2023-08-29 PROCEDURE — 99285 EMERGENCY DEPT VISIT HI MDM: CPT

## 2023-08-29 PROCEDURE — 6360000002 HC RX W HCPCS: Performed by: PHYSICIAN ASSISTANT

## 2023-08-29 RX ORDER — DEXAMETHASONE SODIUM PHOSPHATE 10 MG/ML
4 INJECTION, SOLUTION INTRAMUSCULAR; INTRAVENOUS ONCE
Status: COMPLETED | OUTPATIENT
Start: 2023-08-29 | End: 2023-08-29

## 2023-08-29 RX ORDER — KETOROLAC TROMETHAMINE 15 MG/ML
15 INJECTION, SOLUTION INTRAMUSCULAR; INTRAVENOUS ONCE
Status: COMPLETED | OUTPATIENT
Start: 2023-08-29 | End: 2023-08-29

## 2023-08-29 RX ORDER — OXYCODONE HYDROCHLORIDE AND ACETAMINOPHEN 5; 325 MG/1; MG/1
1 TABLET ORAL
Status: COMPLETED | OUTPATIENT
Start: 2023-08-29 | End: 2023-08-29

## 2023-08-29 RX ORDER — DEXAMETHASONE SODIUM PHOSPHATE 10 MG/ML
6 INJECTION, SOLUTION INTRAMUSCULAR; INTRAVENOUS ONCE
Status: COMPLETED | OUTPATIENT
Start: 2023-08-29 | End: 2023-08-29

## 2023-08-29 RX ORDER — DEXAMETHASONE SODIUM PHOSPHATE 10 MG/ML
4 INJECTION, SOLUTION INTRAMUSCULAR; INTRAVENOUS ONCE
Status: DISCONTINUED | OUTPATIENT
Start: 2023-08-29 | End: 2023-08-29

## 2023-08-29 RX ORDER — DEXAMETHASONE SODIUM PHOSPHATE 10 MG/ML
10 INJECTION, SOLUTION INTRAMUSCULAR; INTRAVENOUS ONCE
Status: DISCONTINUED | OUTPATIENT
Start: 2023-08-29 | End: 2023-08-29

## 2023-08-29 RX ADMIN — IOPAMIDOL 100 ML: 755 INJECTION, SOLUTION INTRAVENOUS at 22:03

## 2023-08-29 RX ADMIN — OXYCODONE HYDROCHLORIDE AND ACETAMINOPHEN 1 TABLET: 5; 325 TABLET ORAL at 00:50

## 2023-08-29 RX ADMIN — KETOROLAC TROMETHAMINE 15 MG: 15 INJECTION, SOLUTION INTRAMUSCULAR; INTRAVENOUS at 20:52

## 2023-08-29 RX ADMIN — DEXAMETHASONE SODIUM PHOSPHATE 6 MG: 10 INJECTION, SOLUTION INTRAMUSCULAR; INTRAVENOUS at 22:48

## 2023-08-29 RX ADMIN — WATER 1000 MG: 1 INJECTION INTRAMUSCULAR; INTRAVENOUS; SUBCUTANEOUS at 00:49

## 2023-08-29 RX ADMIN — DEXAMETHASONE SODIUM PHOSPHATE 4 MG: 10 INJECTION, SOLUTION INTRAMUSCULAR; INTRAVENOUS at 18:45

## 2023-08-29 ASSESSMENT — ENCOUNTER SYMPTOMS
FACIAL SWELLING: 0
TROUBLE SWALLOWING: 0
EYES NEGATIVE: 1
RESPIRATORY NEGATIVE: 1
SORE THROAT: 1
GASTROINTESTINAL NEGATIVE: 1

## 2023-08-29 ASSESSMENT — PAIN SCALES - GENERAL
PAINLEVEL_OUTOF10: 10
PAINLEVEL_OUTOF10: 10
PAINLEVEL_OUTOF10: 2

## 2023-08-29 ASSESSMENT — LIFESTYLE VARIABLES
HOW MANY STANDARD DRINKS CONTAINING ALCOHOL DO YOU HAVE ON A TYPICAL DAY: 3 OR 4
HOW OFTEN DO YOU HAVE A DRINK CONTAINING ALCOHOL: 2-4 TIMES A MONTH

## 2023-08-29 ASSESSMENT — PAIN DESCRIPTION - LOCATION
LOCATION: THROAT

## 2023-08-29 ASSESSMENT — PAIN - FUNCTIONAL ASSESSMENT
PAIN_FUNCTIONAL_ASSESSMENT: 0-10
PAIN_FUNCTIONAL_ASSESSMENT: ACTIVITIES ARE NOT PREVENTED

## 2023-08-29 NOTE — ED TRIAGE NOTES
Patient presents with complaint of sore throat x2 days. Complains of decreased appetite. States symptoms are making it difficult for him to sleep at night.

## 2023-08-29 NOTE — ED TRIAGE NOTES
Arrives to ED with complaints of tonsillitis, states was seen at [address] [place] [address] yesterday and received antibx and pain meds, is still having difficulty swallowing.

## 2023-08-29 NOTE — ED PROVIDER NOTES
[facility] EMERGENCY DEPT  EMERGENCY DEPARTMENT HISTORY AND PHYSICAL EXAM      Date: 8/29/2023  Patient Name: Lizette Johnson  MRN: [id number]  [facility] [facility] 1985  Date of evaluation: 8/29/2023  Provider: Hermelinda Bonilla PA-C   Note Started: 6:50 PM EDT 8/29/23    HISTORY OF PRESENT ILLNESS     Chief Complaint   Patient presents with    Pharyngitis       History Provided By: Patient    HPI: Lizette Johnson is a 41 y.o. male with past medical history of hypertension, kidney stone presents emergency department for evaluation of sore throat. Reports that he was seen at [facility] yesterday was diagnosed with tonsillitis/pharyngitis and provided an antibiotic instructed to return to the ER if symptoms worsen. Patient reports that his symptoms of sore throat have continued to persist and he is unable to swallow any food due to the pain. PAST MEDICAL HISTORY   Past Medical History:  Past Medical History:   Diagnosis Date    HTN (hypertension)     Kidney stone        Past Surgical History:  Past Surgical History:   Procedure Laterality Date    LITHOTRIPSY  01/20/2021    Extracorporeal shockwave lithotripsy    ORTHOPEDIC SURGERY Right     Thumb reattachment    SHOULDER ARTHROSCOPY         Family History:  Family History   Problem Relation Age of Onset    Hypertension Mother     No Known Problems Father     Stroke Maternal Grandmother     Diabetes Maternal Grandmother     Hypertension Maternal Grandmother     Cancer Paternal Grandmother        Social History:  Social History     Tobacco Use    Smoking status: Every Day     Types: Cigarettes     Passive exposure: Never    Smokeless tobacco: Never   Vaping Use    Vaping Use: Never used   Substance Use Topics    Alcohol use: Yes     Alcohol/week: 2.0 standard drinks    Drug use: Not Currently     Types: Marijuana Abhay Lux)       Allergies:  No Known Allergies    PCP: RUTH ANN Small    Current Meds:   No current facility-administered medications for this encounter.      Current

## 2023-08-30 ENCOUNTER — APPOINTMENT (OUTPATIENT)
Facility: HOSPITAL | Age: 38
End: 2023-08-30
Payer: COMMERCIAL

## 2023-08-30 PROBLEM — J35.1 SWELLING OF TONSIL: Status: ACTIVE | Noted: 2023-08-30

## 2023-08-30 PROBLEM — K85.90 ACUTE PANCREATITIS: Status: RESOLVED | Noted: 2023-08-30 | Resolved: 2023-08-30

## 2023-08-30 PROBLEM — K85.90 ACUTE PANCREATITIS: Status: ACTIVE | Noted: 2023-08-30

## 2023-08-30 PROBLEM — E11.65 UNCONTROLLED DIABETES MELLITUS WITH HYPERGLYCEMIA (HCC): Status: ACTIVE | Noted: 2023-08-30

## 2023-08-30 LAB
25(OH)D3 SERPL-MCNC: <9 NG/ML (ref 30–100)
ALBUMIN SERPL-MCNC: 3.8 G/DL (ref 3.5–5)
ANION GAP SERPL CALC-SCNC: 10 MMOL/L (ref 5–15)
BACTERIA SPEC CULT: NORMAL
BUN SERPL-MCNC: 17 MG/DL (ref 6–20)
BUN/CREAT SERPL: 13 (ref 12–20)
CA-I BLD-MCNC: 10.1 MG/DL (ref 8.5–10.1)
CHLORIDE SERPL-SCNC: 103 MMOL/L (ref 97–108)
CO2 SERPL-SCNC: 24 MMOL/L (ref 21–32)
CREAT SERPL-MCNC: 1.31 MG/DL (ref 0.7–1.3)
GLUCOSE BLD STRIP.AUTO-MCNC: 305 MG/DL (ref 65–100)
GLUCOSE SERPL-MCNC: 217 MG/DL (ref 65–100)
Lab: NORMAL
MRSA DNA SPEC QL NAA+PROBE: NOT DETECTED
PERFORMED BY:: ABNORMAL
PHOSPHATE SERPL-MCNC: 2.8 MG/DL (ref 2.6–4.7)
POTASSIUM SERPL-SCNC: 4.5 MMOL/L (ref 3.5–5.1)
SODIUM SERPL-SCNC: 137 MMOL/L (ref 136–145)
TSH SERPL DL<=0.05 MIU/L-ACNC: 0.42 UIU/ML (ref 0.36–3.74)
URATE SERPL-MCNC: 7.9 MG/DL (ref 3.5–7.2)

## 2023-08-30 PROCEDURE — 84443 ASSAY THYROID STIM HORMONE: CPT

## 2023-08-30 PROCEDURE — 71250 CT THORAX DX C-: CPT

## 2023-08-30 PROCEDURE — 99221 1ST HOSP IP/OBS SF/LOW 40: CPT | Performed by: OTOLARYNGOLOGY

## 2023-08-30 PROCEDURE — 80069 RENAL FUNCTION PANEL: CPT

## 2023-08-30 PROCEDURE — G0378 HOSPITAL OBSERVATION PER HR: HCPCS

## 2023-08-30 PROCEDURE — 6370000000 HC RX 637 (ALT 250 FOR IP): Performed by: HOSPITALIST

## 2023-08-30 PROCEDURE — 96366 THER/PROPH/DIAG IV INF ADDON: CPT

## 2023-08-30 PROCEDURE — 84550 ASSAY OF BLOOD/URIC ACID: CPT

## 2023-08-30 PROCEDURE — 82306 VITAMIN D 25 HYDROXY: CPT

## 2023-08-30 PROCEDURE — 6360000002 HC RX W HCPCS: Performed by: HOSPITALIST

## 2023-08-30 PROCEDURE — 87641 MR-STAPH DNA AMP PROBE: CPT

## 2023-08-30 PROCEDURE — 82962 GLUCOSE BLOOD TEST: CPT

## 2023-08-30 PROCEDURE — 6370000000 HC RX 637 (ALT 250 FOR IP): Performed by: NURSE PRACTITIONER

## 2023-08-30 PROCEDURE — 96365 THER/PROPH/DIAG IV INF INIT: CPT

## 2023-08-30 PROCEDURE — 2580000003 HC RX 258: Performed by: HOSPITALIST

## 2023-08-30 PROCEDURE — 6360000004 HC RX CONTRAST MEDICATION: Performed by: HOSPITALIST

## 2023-08-30 RX ORDER — SODIUM CHLORIDE 9 MG/ML
INJECTION, SOLUTION INTRAVENOUS PRN
Status: DISCONTINUED | OUTPATIENT
Start: 2023-08-30 | End: 2023-08-31 | Stop reason: HOSPADM

## 2023-08-30 RX ORDER — DEXTROSE MONOHYDRATE 100 MG/ML
INJECTION, SOLUTION INTRAVENOUS CONTINUOUS PRN
Status: DISCONTINUED | OUTPATIENT
Start: 2023-08-30 | End: 2023-08-31 | Stop reason: HOSPADM

## 2023-08-30 RX ORDER — KETOROLAC TROMETHAMINE 15 MG/ML
15 INJECTION, SOLUTION INTRAMUSCULAR; INTRAVENOUS EVERY 6 HOURS PRN
Status: DISCONTINUED | OUTPATIENT
Start: 2023-08-30 | End: 2023-08-30

## 2023-08-30 RX ORDER — CLONIDINE HYDROCHLORIDE 0.1 MG/1
0.1 TABLET ORAL ONCE
Status: DISCONTINUED | OUTPATIENT
Start: 2023-08-31 | End: 2023-08-31 | Stop reason: HOSPADM

## 2023-08-30 RX ORDER — ACETAMINOPHEN 325 MG/1
650 TABLET ORAL EVERY 6 HOURS PRN
Status: DISCONTINUED | OUTPATIENT
Start: 2023-08-30 | End: 2023-08-31 | Stop reason: HOSPADM

## 2023-08-30 RX ORDER — CELECOXIB 200 MG/1
200 CAPSULE ORAL 2 TIMES DAILY
Status: DISCONTINUED | OUTPATIENT
Start: 2023-08-31 | End: 2023-08-30

## 2023-08-30 RX ORDER — NICOTINE 21 MG/24HR
1 PATCH, TRANSDERMAL 24 HOURS TRANSDERMAL DAILY
Status: DISCONTINUED | OUTPATIENT
Start: 2023-08-30 | End: 2023-08-31 | Stop reason: HOSPADM

## 2023-08-30 RX ORDER — LOSARTAN POTASSIUM 50 MG/1
50 TABLET ORAL ONCE
Status: COMPLETED | OUTPATIENT
Start: 2023-08-30 | End: 2023-08-30

## 2023-08-30 RX ORDER — INSULIN LISPRO 100 [IU]/ML
0-4 INJECTION, SOLUTION INTRAVENOUS; SUBCUTANEOUS NIGHTLY
Status: DISCONTINUED | OUTPATIENT
Start: 2023-08-30 | End: 2023-08-31 | Stop reason: HOSPADM

## 2023-08-30 RX ORDER — INSULIN LISPRO 100 [IU]/ML
0-8 INJECTION, SOLUTION INTRAVENOUS; SUBCUTANEOUS
Status: DISCONTINUED | OUTPATIENT
Start: 2023-08-30 | End: 2023-08-31 | Stop reason: HOSPADM

## 2023-08-30 RX ORDER — AMOXICILLIN AND CLAVULANATE POTASSIUM 875; 125 MG/1; MG/1
1 TABLET, FILM COATED ORAL EVERY 12 HOURS SCHEDULED
Status: DISCONTINUED | OUTPATIENT
Start: 2023-08-30 | End: 2023-08-31

## 2023-08-30 RX ORDER — FAMOTIDINE 20 MG/1
20 TABLET, FILM COATED ORAL 2 TIMES DAILY
Status: DISCONTINUED | OUTPATIENT
Start: 2023-08-30 | End: 2023-08-31 | Stop reason: HOSPADM

## 2023-08-30 RX ORDER — ACETAMINOPHEN 650 MG/1
650 SUPPOSITORY RECTAL EVERY 6 HOURS PRN
Status: DISCONTINUED | OUTPATIENT
Start: 2023-08-30 | End: 2023-08-31 | Stop reason: HOSPADM

## 2023-08-30 RX ORDER — ONDANSETRON 4 MG/1
4 TABLET, ORALLY DISINTEGRATING ORAL EVERY 8 HOURS PRN
Status: DISCONTINUED | OUTPATIENT
Start: 2023-08-30 | End: 2023-08-31 | Stop reason: HOSPADM

## 2023-08-30 RX ORDER — SODIUM CHLORIDE 0.9 % (FLUSH) 0.9 %
5-40 SYRINGE (ML) INJECTION EVERY 12 HOURS SCHEDULED
Status: DISCONTINUED | OUTPATIENT
Start: 2023-08-30 | End: 2023-08-31 | Stop reason: HOSPADM

## 2023-08-30 RX ORDER — AMLODIPINE BESYLATE 5 MG/1
10 TABLET ORAL NIGHTLY
Status: DISCONTINUED | OUTPATIENT
Start: 2023-08-30 | End: 2023-08-31 | Stop reason: HOSPADM

## 2023-08-30 RX ORDER — LOSARTAN POTASSIUM 50 MG/1
100 TABLET ORAL DAILY
Status: DISCONTINUED | OUTPATIENT
Start: 2023-08-31 | End: 2023-08-31 | Stop reason: HOSPADM

## 2023-08-30 RX ORDER — LOSARTAN POTASSIUM 50 MG/1
50 TABLET ORAL DAILY
Status: DISCONTINUED | OUTPATIENT
Start: 2023-08-30 | End: 2023-08-30

## 2023-08-30 RX ORDER — DEXAMETHASONE SODIUM PHOSPHATE 10 MG/ML
4 INJECTION, SOLUTION INTRAMUSCULAR; INTRAVENOUS ONCE
Status: DISCONTINUED | OUTPATIENT
Start: 2023-08-31 | End: 2023-08-30

## 2023-08-30 RX ORDER — TRAMADOL HYDROCHLORIDE 50 MG/1
50 TABLET ORAL EVERY 6 HOURS PRN
Status: DISCONTINUED | OUTPATIENT
Start: 2023-08-30 | End: 2023-08-31 | Stop reason: HOSPADM

## 2023-08-30 RX ORDER — AMLODIPINE BESYLATE 5 MG/1
5 TABLET ORAL NIGHTLY
Status: DISCONTINUED | OUTPATIENT
Start: 2023-08-30 | End: 2023-08-30

## 2023-08-30 RX ORDER — CELECOXIB 200 MG/1
200 CAPSULE ORAL DAILY
Status: DISCONTINUED | OUTPATIENT
Start: 2023-08-30 | End: 2023-08-31 | Stop reason: HOSPADM

## 2023-08-30 RX ORDER — SODIUM CHLORIDE 0.9 % (FLUSH) 0.9 %
5-40 SYRINGE (ML) INJECTION PRN
Status: DISCONTINUED | OUTPATIENT
Start: 2023-08-30 | End: 2023-08-31 | Stop reason: HOSPADM

## 2023-08-30 RX ORDER — ONDANSETRON 2 MG/ML
4 INJECTION INTRAMUSCULAR; INTRAVENOUS EVERY 6 HOURS PRN
Status: DISCONTINUED | OUTPATIENT
Start: 2023-08-30 | End: 2023-08-31 | Stop reason: HOSPADM

## 2023-08-30 RX ORDER — ALPRAZOLAM 0.5 MG/1
2 TABLET ORAL 3 TIMES DAILY PRN
Status: DISCONTINUED | OUTPATIENT
Start: 2023-08-30 | End: 2023-08-31 | Stop reason: HOSPADM

## 2023-08-30 RX ADMIN — FAMOTIDINE 20 MG: 20 TABLET ORAL at 22:45

## 2023-08-30 RX ADMIN — LOSARTAN POTASSIUM 50 MG: 50 TABLET, FILM COATED ORAL at 09:26

## 2023-08-30 RX ADMIN — DIATRIZOATE MEGLUMINE AND DIATRIZOATE SODIUM 30 ML: 660; 100 LIQUID ORAL; RECTAL at 21:16

## 2023-08-30 RX ADMIN — PIPERACILLIN AND TAZOBACTAM 3375 MG: 3; .375 INJECTION, POWDER, LYOPHILIZED, FOR SOLUTION INTRAVENOUS at 09:27

## 2023-08-30 RX ADMIN — AMOXICILLIN AND CLAVULANATE POTASSIUM 1 TABLET: 875; 125 TABLET, FILM COATED ORAL at 22:45

## 2023-08-30 RX ADMIN — INSULIN LISPRO 4 UNITS: 100 INJECTION, SOLUTION INTRAVENOUS; SUBCUTANEOUS at 21:58

## 2023-08-30 RX ADMIN — LOSARTAN POTASSIUM 50 MG: 50 TABLET, FILM COATED ORAL at 15:25

## 2023-08-30 RX ADMIN — FAMOTIDINE 20 MG: 20 TABLET ORAL at 01:16

## 2023-08-30 RX ADMIN — CHOLECALCIFEROL TAB 125 MCG (5000 UNIT) 5000 UNITS: 125 TAB at 22:45

## 2023-08-30 RX ADMIN — PIPERACILLIN AND TAZOBACTAM 4500 MG: 4; .5 INJECTION, POWDER, LYOPHILIZED, FOR SOLUTION INTRAVENOUS at 04:18

## 2023-08-30 RX ADMIN — FAMOTIDINE 20 MG: 20 TABLET ORAL at 09:26

## 2023-08-30 RX ADMIN — CELECOXIB 200 MG: 200 CAPSULE ORAL at 15:25

## 2023-08-30 RX ADMIN — AMLODIPINE BESYLATE 10 MG: 5 TABLET ORAL at 22:45

## 2023-08-30 ASSESSMENT — ENCOUNTER SYMPTOMS
ABDOMINAL PAIN: 0
TROUBLE SWALLOWING: 1
COUGH: 0
NAUSEA: 0
VOMITING: 0
SORE THROAT: 1
SHORTNESS OF BREATH: 0

## 2023-08-30 ASSESSMENT — PAIN - FUNCTIONAL ASSESSMENT: PAIN_FUNCTIONAL_ASSESSMENT: ACTIVITIES ARE NOT PREVENTED

## 2023-08-30 ASSESSMENT — PAIN SCALES - GENERAL: PAINLEVEL_OUTOF10: 0

## 2023-08-30 ASSESSMENT — PAIN DESCRIPTION - LOCATION: LOCATION: THROAT

## 2023-08-30 NOTE — H&P
Hospitalist History & Physical Notes. [facility] Name : Jefry Romano      MRN number : [id number]     YOB: 1985     Subjective :   Chief Complaint : Sore throat diagnosed with tonsillitis not improving    Source of information : Patient, ED provider, previous records. History of present illness:   Jefry Romano is  41 y.o. morbidly obese male with history of hypertension but not taking his medications regularly presents to the emergency room complaining of worsening of sore throat that was started 3 days ago. Denies anything that might have triggered and no previous history. Feels like choking when he is trying to eat, unable to swallow good with pain. Denies any fever or chills. Difficulty swallowing but denies any nausea or vomiting. CT scan neck is suggesting of left tonsillar swelling with mass effect on the airway but no evident abscess. Patient admits drooling and has difficulty swallowing food. Laboratory data suggest dehydration. Past Medical History:   Diagnosis Date    HTN (hypertension)     Kidney stone      Past Surgical History:   Procedure Laterality Date    LITHOTRIPSY  01/20/2021    Extracorporeal shockwave lithotripsy    ORTHOPEDIC SURGERY Right     Thumb reattachment    SHOULDER ARTHROSCOPY       Family History   Problem Relation Age of Onset    Hypertension Mother     No Known Problems Father     Stroke Maternal Grandmother     Diabetes Maternal Grandmother     Hypertension Maternal Grandmother     Cancer Paternal Grandmother       Social History     Tobacco Use    Smoking status: Every Day     Types: Cigarettes     Passive exposure: Never    Smokeless tobacco: Never   Substance Use Topics    Alcohol use:  Yes     Alcohol/week: 2.0 standard drinks       No Known Allergies   Current Facility-Administered Medications   Medication Dose Route Frequency Provider Last Rate Last Admin    ketorolac (TORADOL) injection 15 mg  15 mg likely reflecting acute infectious/inflammatory process. Assessment and Plan : Tonsillar inflammation and swelling: No abscess on the CT scan but still feels slightly infectious. But unilateral swelling is concerning and needed to think about more differential diagnosis than just tonsillitis. Start him on antibiotics and anti-inflammatory medications, patient is explained to do warm saline gargling to help with oral hygiene, recommended him to continue that. He already has a ENT appointment on September 6, if no improvement may need to evaluate with biopsy for other lymphatic etiologies. Essential hypertension: Supposed to be on amlodipine not taking, will resume at this point. Explained about importance of taking medication regularly    Morbid obesity: Counseled about importance of watching diet and lose weight, also counseled him about importance of quit smoking. Hypercalcemia: Likely from volume contraction. But clinically patient does not look volume contracted. This may be secondary to metabolic abnormalities versus other etiology need to be considered. He also has renal calculi, may benefit evaluation of stone formation. Nephrolithiasis: Bilateral nonobstructing renal calculi, need close follow-up. Will benefit work-up for renal stones especially with hypercalcemia    Suspicious right renal cyst or cystic lesion found on CTA of the chest done June 11 2022, recommended for follow-up with dedicated an MRI to exclude tumor. Not sure if he ever followed, we will refer him to the urology. I will get a CT of the abdomen with no contrast for evaluation can get an MRI as an outpatient with urology. Admitted for observation to the medical floor, request consultation from otolaryngologist.  We will follow with recommendations.       Medications Home :    Reviewed    Code status : Full code    VTE prophylaxis :  Enoxaparin    Advance Medical directive : Health care decision maker

## 2023-08-30 NOTE — ED NOTES
Blood pressure taken with appropriate sized cuff on upper arm, which resulted in more accurate reading. (162/92)    Patient noncompliant with norvasc, states he has not taken it in a few days due to his sore throat. Last fill at pharmacy was June 2023.       Antonia Palma RN  08/30/23 1302

## 2023-08-30 NOTE — CONSULTS
Otolaryngology-Head and Neck Surgery  Inpatient Consultation    Patient: Leroy Booker  YOB: 1985  MRN: [id number]  Date of Service:  8/30/2023    Reason for Consultation:   Chief Complaint   Patient presents with    Pharyngitis       Requesting Physician:  Hospitalist    History of Present Illness: Leroy Booker is a 41y.o. year old male who was admitted last night with sore throat    Few days ago developed left sore throat to the point he was not able to swallow or drink    Seen in ER  - given augmentin  With continued symptoms, represented last night    Feels better after IV antibiotics and steroids    Just ate lunch   Tolerating secretions    No prior tonsillitis history  No prior neck, throat surgery    Past Medical History:  Past Medical History:   Diagnosis Date    HTN (hypertension)     Kidney stone        Past Surgical History:   Past Surgical History:   Procedure Laterality Date    LITHOTRIPSY  01/20/2021    Extracorporeal shockwave lithotripsy    ORTHOPEDIC SURGERY Right     Thumb reattachment    SHOULDER ARTHROSCOPY         Medications:   Current Outpatient Medications   Medication Instructions    amLODIPine (NORVASC) 5 mg, Oral, DAILY    amoxicillin-clavulanate (AUGMENTIN) 875-125 MG per tablet 1 tablet, Oral, 2 TIMES DAILY    HYDROcodone-acetaminophen (NORCO) 5-325 MG per tablet 1 tablet, Oral, EVERY 6 HOURS PRN, Intended supply: 3 days. Take lowest dose possible to manage pain       Allergies:   No Known Allergies    Social History:   Social History     Socioeconomic History    Marital status:      Spouse name: None    Number of children: None    Years of education: None    Highest education level: None   Tobacco Use    Smoking status: Every Day     Types: Cigarettes     Passive exposure: Never    Smokeless tobacco: Never   Vaping Use    Vaping Use: Never used   Substance and Sexual Activity    Alcohol use:  Yes     Alcohol/week: 2.0 standard drinks    Drug use: Not Currently

## 2023-08-30 NOTE — PROGRESS NOTES
Pts BP this afternoon 176/105. Luis Fu NP notified. New orders received. Completed as ordered. 1515--Orders received for Losartan 50 mg once. Completed as ordered. Pt condition stable at this time.      1642--BP rechecked 156/98    1730--BP rechecked 150/88

## 2023-08-30 NOTE — ED NOTES
Patient requesting to be admitted for observation. ED provider speaking to hospitalist.    Patient moved from chair to room 11 for further care.       Krystyna Cleveland RN  08/29/23 1867

## 2023-08-30 NOTE — ED NOTES
Multiple attempts at IV by three staff members.    Charge nurse informed and attempting access      Haley Bowens RN  08/29/23 9632

## 2023-08-30 NOTE — PROGRESS NOTES
Hospitalist Progress Note    NAME:   Sindy Nobles   : 1985   MRN: [id number]     Date/Time: 2023 2:19 PM  Patient PCP: RUTH ANN Padilla    Estimated discharge date: 24 hrs  Barriers: clinical  improvement      Assessment / Plan: Tonsillar inflammation and swelling- ? Infection, strep test negative, CT neck does not show abscess. Continue IV abx, will f/u with ENT after discharge. 2. Hypertension-not well controlled, on norvasc, losartan, recommend f/u with PCP after discharge    3. Renal cyst or lesion- seen on CT chest in , consult to urology , can follow as outpatient for further evaluation. Medical Decision Making:   I personally reviewed labs:CBC, BMP  I personally reviewed imaging:CT soft tissue neck   Toxic drug monitoring: IV antibiotics Rachel Benne for treating tonsillar infection  Discussed case with: patient, staff RN, CM        Code Status: Full  DVT Prophylaxis: lovenox  GI Prophylaxis:not indicated    Subjective:     Chief Complaint / Reason for Physician Visit  Sore throat. Discussed with RN events overnight. Sindy Nobles is  41 y.o. morbidly obese male with history of hypertension but not taking his medications regularly presents to the emergency room complaining of worsening of sore throat that was started 3 days ago. Denies anything that might have triggered, however reports had his bottle of water misplaced, and he drank someone else's water bottle at his work. He had choking when he was trying to eat, unable to swallow  well and  it is with pain. Denies any fever or chills. Difficulty swallowing but denies any nausea or vomiting. CT scan neck is suggesting of left tonsillar swelling with mass effect on the airway but no evident abscess. Patient admits drooling occasionally, no shortness of breath or chest pain reported. Objective:     VITALS:   Last 24hrs VS reviewed since prior progress note.  Most recent are:  Patient Vitals for the past 24 and dry. Neurological:      Mental Status: He is oriented to person, place, and time. Reviewed most current lab test results and cultures  YES  Reviewed most current radiology test results   YES  Review and summation of old records today    NO  Reviewed patient's current orders and MAR    YES  PMH/SH reviewed - no change compared to H&P  ________________________________________________________________________  Care Plan discussed with:    Comments   Patient x    Family      RN x    Care Manager     Consultant                        Multidiciplinary team rounds were held today with , nursing, pharmacist and clinical coordinator. Patient's plan of care was discussed; medications were reviewed and discharge planning was addressed. ________________________________________________________________________  Total NON critical care TIME:  35  Minutes    Total CRITICAL CARE TIME Spent:   Minutes non procedure based      Comments   >50% of visit spent in counseling and coordination of care     ________________________________________________________________________  ASHLEE Rosas NP     Procedures: see electronic medical records for all procedures/Xrays and details which were not copied into this note but were reviewed prior to creation of Plan. LABS:  I reviewed today's most current labs and imaging studies.   Pertinent labs include:  Recent Labs     08/29/23 2059   WBC 11.2*   HGB 14.3   HCT 43.8   *     Recent Labs     08/29/23 2059 08/30/23  0526    137   K 4.5 4.5    103   CO2 26 24   BUN 16 17   PHOS  --  2.8       Signed: ASHLEE Rosas NP

## 2023-08-30 NOTE — PROGRESS NOTES
4 Eyes Skin Assessment     NAME:  Miguel Pineda  YOB: 1985  MEDICAL RECORD NUMBER:  [id number]    The patient is being assessed for  Other Dual skin assessment  day    I agree that at least one RN has performed a thorough Head to Toe Skin Assessment on the patient. ALL assessment sites listed below have been assessed. Areas assessed by both nurses:    Head, Face, Ears, Shoulders, Back, Chest, Arms, Elbows, Hands, Sacrum. Buttock, Coccyx, Ischium, Legs. Feet and Heels, and Under Medical Devices         Does the Patient have a Wound?  No noted wound(s)       [facility] Prevention initiated by RN: no  Wound Care Orders initiated by RN: No    Pressure Injury (Stage 3,4, Unstageable, DTI, NWPT, and Complex wounds) if present, place Wound referral order by RN under : No    New Ostomies, if present place, Ostomy referral order under : No     Nurse 1 eSignature: Electronically signed by Nilda Rock RN on 8/30/23 at 11:14 AM EDT    **SHARE this note so that the co-signing nurse can place an eSignature**    Nurse 2 eSignature: Electronically signed by Sierra Sewell RN on 8/30/23 at 6:22 PM EDT

## 2023-08-31 VITALS
HEART RATE: 92 BPM | OXYGEN SATURATION: 96 % | RESPIRATION RATE: 18 BRPM | TEMPERATURE: 97.7 F | BODY MASS INDEX: 44.1 KG/M2 | DIASTOLIC BLOOD PRESSURE: 99 MMHG | HEIGHT: 71 IN | WEIGHT: 315 LBS | SYSTOLIC BLOOD PRESSURE: 148 MMHG

## 2023-08-31 PROBLEM — K85.90 ACUTE PANCREATITIS: Status: RESOLVED | Noted: 2023-08-31 | Resolved: 2023-08-31

## 2023-08-31 PROBLEM — I10 HYPERTENSION: Status: ACTIVE | Noted: 2023-08-31

## 2023-08-31 PROBLEM — K85.90 ACUTE PANCREATITIS: Status: ACTIVE | Noted: 2023-08-31

## 2023-08-31 LAB
BASOPHILS # BLD: 0 K/UL (ref 0–0.1)
BASOPHILS NFR BLD: 0 % (ref 0–1)
DIFFERENTIAL METHOD BLD: ABNORMAL
EOSINOPHIL # BLD: 0 K/UL (ref 0–0.4)
EOSINOPHIL NFR BLD: 0 % (ref 0–7)
ERYTHROCYTE [DISTWIDTH] IN BLOOD BY AUTOMATED COUNT: 14.6 % (ref 11.5–14.5)
EST. AVERAGE GLUCOSE BLD GHB EST-MCNC: 143 MG/DL
GLUCOSE BLD STRIP.AUTO-MCNC: 185 MG/DL (ref 65–100)
HBA1C MFR BLD: 6.6 % (ref 4–5.6)
HCT VFR BLD AUTO: 43.4 % (ref 36.6–50.3)
HGB BLD-MCNC: 14.6 G/DL (ref 12.1–17)
IMM GRANULOCYTES # BLD AUTO: 0.1 K/UL (ref 0–0.04)
IMM GRANULOCYTES NFR BLD AUTO: 1 % (ref 0–0.5)
LYMPHOCYTES # BLD: 3.8 K/UL (ref 0.8–3.5)
LYMPHOCYTES NFR BLD: 26 % (ref 12–49)
MCH RBC QN AUTO: 30 PG (ref 26–34)
MCHC RBC AUTO-ENTMCNC: 33.6 G/DL (ref 30–36.5)
MCV RBC AUTO: 89.1 FL (ref 80–99)
MONOCYTES # BLD: 0.9 K/UL (ref 0–1)
MONOCYTES NFR BLD: 6 % (ref 5–13)
NEUTS SEG # BLD: 9.8 K/UL (ref 1.8–8)
NEUTS SEG NFR BLD: 67 % (ref 32–75)
NRBC # BLD: 0 K/UL (ref 0–0.01)
NRBC BLD-RTO: 0 PER 100 WBC
PERFORMED BY:: ABNORMAL
PLATELET # BLD AUTO: 509 K/UL (ref 150–400)
PMV BLD AUTO: 9.2 FL (ref 8.9–12.9)
RBC # BLD AUTO: 4.87 M/UL (ref 4.1–5.7)
T4 FREE SERPL-MCNC: 1 NG/DL (ref 0.8–1.5)
WBC # BLD AUTO: 14.6 K/UL (ref 4.1–11.1)

## 2023-08-31 PROCEDURE — 36415 COLL VENOUS BLD VENIPUNCTURE: CPT

## 2023-08-31 PROCEDURE — 83036 HEMOGLOBIN GLYCOSYLATED A1C: CPT

## 2023-08-31 PROCEDURE — 85025 COMPLETE CBC W/AUTO DIFF WBC: CPT

## 2023-08-31 PROCEDURE — 82962 GLUCOSE BLOOD TEST: CPT

## 2023-08-31 PROCEDURE — 6370000000 HC RX 637 (ALT 250 FOR IP): Performed by: NURSE PRACTITIONER

## 2023-08-31 PROCEDURE — 6370000000 HC RX 637 (ALT 250 FOR IP): Performed by: HOSPITALIST

## 2023-08-31 PROCEDURE — 84439 ASSAY OF FREE THYROXINE: CPT

## 2023-08-31 PROCEDURE — G0378 HOSPITAL OBSERVATION PER HR: HCPCS

## 2023-08-31 PROCEDURE — 83970 ASSAY OF PARATHORMONE: CPT

## 2023-08-31 RX ORDER — CLARITHROMYCIN 250 MG/1
250 TABLET, FILM COATED ORAL EVERY 12 HOURS SCHEDULED
Status: DISCONTINUED | OUTPATIENT
Start: 2023-08-31 | End: 2023-08-31 | Stop reason: HOSPADM

## 2023-08-31 RX ORDER — CLARITHROMYCIN 250 MG/1
250 TABLET, FILM COATED ORAL EVERY 12 HOURS SCHEDULED
Qty: 14 TABLET | Refills: 0 | Status: SHIPPED | OUTPATIENT
Start: 2023-08-31 | End: 2023-09-07

## 2023-08-31 RX ORDER — AMLODIPINE BESYLATE 10 MG/1
10 TABLET ORAL NIGHTLY
Qty: 30 TABLET | Refills: 0 | Status: SHIPPED | OUTPATIENT
Start: 2023-08-31 | End: 2023-09-30

## 2023-08-31 RX ORDER — LOSARTAN POTASSIUM 100 MG/1
100 TABLET ORAL DAILY
Qty: 30 TABLET | Refills: 0 | Status: SHIPPED | OUTPATIENT
Start: 2023-09-01 | End: 2023-10-01

## 2023-08-31 RX ADMIN — CELECOXIB 200 MG: 200 CAPSULE ORAL at 09:22

## 2023-08-31 RX ADMIN — AMOXICILLIN AND CLAVULANATE POTASSIUM 1 TABLET: 875; 125 TABLET, FILM COATED ORAL at 07:07

## 2023-08-31 RX ADMIN — FAMOTIDINE 20 MG: 20 TABLET ORAL at 09:22

## 2023-08-31 RX ADMIN — CHOLECALCIFEROL TAB 125 MCG (5000 UNIT) 5000 UNITS: 125 TAB at 09:22

## 2023-08-31 RX ADMIN — LOSARTAN POTASSIUM 100 MG: 50 TABLET, FILM COATED ORAL at 09:22

## 2023-08-31 NOTE — DISCHARGE SUMMARY
Discharge Summary    Name: Bhaskar Hanson  [id number]  YOB: 1985 (Age: 41 y.o.)   Date of Admission: 8/29/2023  Date of Discharge: 8/31/2023  Attending Physician: No att. providers found    Discharge Diagnosis:   Principal Problem:    Swelling of tonsil  Resolved Problems:      Consultations:  IP CONSULT TO HOSPITALIST  IP CONSULT TO OTOLARYNGOLOGY  IP CONSULT TO [address].      Brief Admission History/Reason for Admission Per Lieutenant Roseline MD:   Bhaskar Hanson is  41 y.o. morbidly obese male with history of hypertension but not taking his medications regularly presents to the emergency room complaining of worsening of sore throat that was started 3 days ago. Denies anything that might have triggered, however reports had his bottle of water misplaced, and he drank someone else's water bottle at his work. He had choking when he was trying to eat, unable to swallow  well and  it is with pain. Denies any fever or chills. Difficulty swallowing but denies any nausea or vomiting. CT scan neck is suggesting of left tonsillar swelling with mass effect on the airway but no evident abscess. Patient admits drooling occasionally, no shortness of breath or chest pain reported. Brief Hospital Course by Main Problems:   Pt was started on IV antibiotics and steroids,and symptoms  improved with course of treatment. He is able to take PO diet and medications without pain and swallowing difficulties. Pt was seen by ENT and they recommend continuation of oral antibiotics to complete a 10 day course. He has scheduled appointment in one week. In regards to his hypertension, dosages of losartan and amlodipine were increased for better BP control. He is instructed to follow up with his PCP for further management. He is to follow up with urology as outpatient to address the non obstructing renal stones and stable left renal cyst seen on CT chest/abdomen and pelvis.  He may

## 2023-08-31 NOTE — PLAN OF CARE
Problem: Discharge Planning  Goal: Discharge to home or other facility with appropriate resources  8/31/2023 1029 by Tayler Ambriz RN  Outcome: Adequate for Discharge  Flowsheets (Taken 8/31/2023 0700 by Matthieu Gutierrez RN)  Discharge to home or other facility with appropriate resources:   Identify barriers to discharge with patient and caregiver   Arrange for needed discharge resources and transportation as appropriate   Identify discharge learning needs (meds, wound care, etc)  8/31/2023 0512 by Sena Colin LPN  Outcome: Progressing  Note: Home w/ wife when Dc'd. Problem: Pain  Goal: Verbalizes/displays adequate comfort level or baseline comfort level  8/31/2023 1029 by Tayler Ambriz RN  Outcome: Adequate for Discharge  8/31/2023 4863 by Sena Colin LPN  Outcome: Progressing  Note: Pt rated pain 1-3/10 from 1900-current. Pt denies need for pain meds. Problem: Safety - Adult  Goal: Free from fall injury  8/31/2023 1029 by Tayler Ambriz RN  Outcome: Adequate for Discharge  8/31/2023 0235 by Sena Colin LPN  Outcome: Progressing  Note: Pt's bed in low position and locked. NS call light and phone w/in reach. Pt's room and pathway free of debris and clutter. Problem: Skin/Tissue Integrity  Goal: Absence of new skin breakdown  Description: 1. Monitor for areas of redness and/or skin breakdown  2. Assess vascular access sites hourly  3. Every 4-6 hours minimum:  Change oxygen saturation probe site  4. Every 4-6 hours:  If on nasal continuous positive airway pressure, respiratory therapy assess nares and determine need for appliance change or resting period. 8/31/2023 1029 by Tayler Ambriz RN  Outcome: Adequate for Discharge  8/31/2023 8403 by Sena Colin LPN  Outcome: Progressing  Note: Pt able to perform bed mobility and reposition self in bed.

## 2023-08-31 NOTE — PROGRESS NOTES
Discharge plan of care/case management plan validated with provider discharge order. Discharge instructions/education provided to the pt with all questions answered in full. PIV x1 removed with cath tip intact. No telemetry. No pascal. Pt safely wheeled down to main entrance and transferred to vehicle. Pt condition stable at time of discharge.

## 2023-08-31 NOTE — PROGRESS NOTES
NSG NOTE- 4338- pt transported off unit to CT via WC accompanied by tranposrt staff. 2230- pt returned to room at this time.

## 2023-08-31 NOTE — PLAN OF CARE
Problem: Discharge Planning  Goal: Discharge to home or other facility with appropriate resources  Outcome: Progressing  Note: Home w/ wife when Dc'd. Problem: Pain  Goal: Verbalizes/displays adequate comfort level or baseline comfort level  Outcome: Progressing  Note: Pt rated pain 1-3/10 from 1900-current. Pt denies need for pain meds. Problem: Safety - Adult  Goal: Free from fall injury  Outcome: Progressing  Note: Pt's bed in low position and locked. NS call light and phone w/in reach. Pt's room and pathway free of debris and clutter. Problem: Skin/Tissue Integrity  Goal: Absence of new skin breakdown  Description: 1. Monitor for areas of redness and/or skin breakdown  2. Assess vascular access sites hourly  3. Every 4-6 hours minimum:  Change oxygen saturation probe site  4. Every 4-6 hours:  If on nasal continuous positive airway pressure, respiratory therapy assess nares and determine need for appliance change or resting period. Outcome: Progressing  Note: Pt able to perform bed mobility and reposition self in bed.

## 2023-08-31 NOTE — PROGRESS NOTES
8/31/2023        RE: Stephanie Mejia         [address] [id number]          To Whom It May Concern,      Due to medical reasons, Stephanie Mejia may  may return to full duty immediately with no restrictions on 9/5/2023        Sincerely,          ASHLEE Salgado NP

## 2023-09-01 LAB
CA-I BLD-MCNC: 10.1 MG/DL (ref 8.5–10.1)
PTH-INTACT SERPL-MCNC: 77.9 PG/ML (ref 18.4–88)

## 2024-02-07 ENCOUNTER — HOSPITAL ENCOUNTER (EMERGENCY)
Facility: HOSPITAL | Age: 39
Discharge: HOME | End: 2024-02-07
Attending: EMERGENCY MEDICINE
Payer: COMMERCIAL

## 2024-02-07 VITALS
HEIGHT: 71 IN | HEART RATE: 97 BPM | DIASTOLIC BLOOD PRESSURE: 78 MMHG | WEIGHT: 297 LBS | OXYGEN SATURATION: 98 % | BODY MASS INDEX: 41.58 KG/M2 | TEMPERATURE: 97.2 F | RESPIRATION RATE: 18 BRPM | SYSTOLIC BLOOD PRESSURE: 120 MMHG

## 2024-02-07 DIAGNOSIS — L05.01 PILONIDAL ABSCESS: Primary | ICD-10-CM

## 2024-02-07 PROCEDURE — 10080 I&D PILONIDAL CYST SIMPLE: CPT

## 2024-02-07 PROCEDURE — 2500000003 HC RX 250 WO HCPCS: Performed by: EMERGENCY MEDICINE

## 2024-02-07 PROCEDURE — 99283 EMERGENCY DEPT VISIT LOW MDM: CPT

## 2024-02-07 PROCEDURE — 6370000000 HC RX 637 (ALT 250 FOR IP): Performed by: EMERGENCY MEDICINE

## 2024-02-07 RX ORDER — SULFAMETHOXAZOLE AND TRIMETHOPRIM 800; 160 MG/1; MG/1
1 TABLET ORAL
Status: COMPLETED | OUTPATIENT
Start: 2024-02-07 | End: 2024-02-07

## 2024-02-07 RX ORDER — LIDOCAINE HYDROCHLORIDE 10 MG/ML
5 INJECTION, SOLUTION EPIDURAL; INFILTRATION; INTRACAUDAL; PERINEURAL
Status: COMPLETED | OUTPATIENT
Start: 2024-02-07 | End: 2024-02-07

## 2024-02-07 RX ORDER — SULFAMETHOXAZOLE AND TRIMETHOPRIM 800; 160 MG/1; MG/1
1 TABLET ORAL 2 TIMES DAILY
Qty: 20 TABLET | Refills: 0 | Status: SHIPPED | OUTPATIENT
Start: 2024-02-07 | End: 2024-02-17

## 2024-02-07 RX ADMIN — LIDOCAINE HYDROCHLORIDE 5 ML: 10 INJECTION, SOLUTION EPIDURAL; INFILTRATION; INTRACAUDAL; PERINEURAL at 05:59

## 2024-02-07 RX ADMIN — SULFAMETHOXAZOLE AND TRIMETHOPRIM 1 TABLET: 800; 160 TABLET ORAL at 06:41

## 2024-02-07 ASSESSMENT — ENCOUNTER SYMPTOMS
GASTROINTESTINAL NEGATIVE: 1
BACK PAIN: 1

## 2024-02-07 ASSESSMENT — PAIN - FUNCTIONAL ASSESSMENT
PAIN_FUNCTIONAL_ASSESSMENT: 0-10
PAIN_FUNCTIONAL_ASSESSMENT: NONE - DENIES PAIN

## 2024-02-07 ASSESSMENT — PAIN SCALES - GENERAL: PAINLEVEL_OUTOF10: 10

## 2024-02-07 NOTE — ED NOTES
Pt was educated on and given discharge instructions, understanding was verbalized. Pt left ED with all belongings.

## 2024-02-07 NOTE — ED TRIAGE NOTES
Pt states that he is having lower back pain that has been happen all night. Pt states that he has two knots that are on the lower part of his back that is making it hurt worse. Pt states that the pain is worsened when he bends or tries to sit down. Pt states that he took millie around 2300.

## 2024-02-07 NOTE — ED NOTES
[facility] EMERGENCY DEPT  EMERGENCY DEPARTMENT ENCOUNTER      Pt Name: Humza Briscoe  MRN: [id number]  Birthdate 1985  Date of evaluation: 2/7/2024  Provider: Sukhwinder Woods MD  6:40 AM    CHIEF COMPLAINT       Chief Complaint   Patient presents with    Back Pain         HISTORY OF PRESENT ILLNESS    Humza Briscoe is a 38 y.o. male who presents to the emergency department with complaint of sacral pain for 2 to 3 days.  He reports a tender nodule.  Pain is worse with movement and palpation.         Nursing Notes were reviewed.    REVIEW OF SYSTEMS       Review of Systems   Constitutional: Negative.    Gastrointestinal: Negative.    Musculoskeletal:  Positive for back pain.   Skin:  Positive for wound.   Hematological: Negative.        Except as noted above the remainder of the review of systems was reviewed and negative.       PAST MEDICAL HISTORY     Past Medical History:   Diagnosis Date    HTN (hypertension)     Kidney stone          SURGICAL HISTORY       Past Surgical History:   Procedure Laterality Date    LITHOTRIPSY  01/20/2021    Extracorporeal shockwave lithotripsy    ORTHOPEDIC SURGERY Right     Thumb reattachment    SHOULDER ARTHROSCOPY           CURRENT MEDICATIONS       Previous Medications    AMLODIPINE (NORVASC) 10 MG TABLET    Take 1 tablet by mouth nightly    LOSARTAN (COZAAR) 100 MG TABLET    Take 1 tablet by mouth daily       ALLERGIES     Patient has no known allergies.    FAMILY HISTORY       Family History   Problem Relation Age of Onset    Hypertension Mother     No Known Problems Father     Stroke Maternal Grandmother     Diabetes Maternal Grandmother     Hypertension Maternal Grandmother     Cancer Paternal Grandmother           SOCIAL HISTORY       Social History     Socioeconomic History    Marital status:      Spouse name: None    Number of children: None    Years of education: None    Highest education level: None   Tobacco Use    Smoking status: Former     Types: Cigars     Passive

## 2024-02-17 ENCOUNTER — HOSPITAL ENCOUNTER (EMERGENCY)
Facility: HOSPITAL | Age: 39
Discharge: HOME | End: 2024-02-17
Attending: EMERGENCY MEDICINE
Payer: COMMERCIAL

## 2024-02-17 VITALS
RESPIRATION RATE: 16 BRPM | TEMPERATURE: 98.4 F | WEIGHT: 315 LBS | HEART RATE: 90 BPM | BODY MASS INDEX: 45.79 KG/M2 | OXYGEN SATURATION: 99 % | DIASTOLIC BLOOD PRESSURE: 81 MMHG | SYSTOLIC BLOOD PRESSURE: 165 MMHG

## 2024-02-17 DIAGNOSIS — R73.9 HYPERGLYCEMIA: Primary | ICD-10-CM

## 2024-02-17 LAB
ALBUMIN SERPL-MCNC: 3.9 G/DL (ref 3.5–5)
ALBUMIN/GLOB SERPL: 0.9 (ref 1.1–2.2)
ALP SERPL-CCNC: 120 U/L (ref 45–117)
ALT SERPL-CCNC: 76 U/L (ref 12–78)
ANION GAP SERPL CALC-SCNC: 11 MMOL/L (ref 5–15)
AST SERPL W P-5'-P-CCNC: 46 U/L (ref 15–37)
BASOPHILS # BLD: 0 K/UL (ref 0–0.1)
BASOPHILS NFR BLD: 0 % (ref 0–1)
BILIRUB SERPL-MCNC: 0.5 MG/DL (ref 0.2–1)
BUN SERPL-MCNC: 19 MG/DL (ref 6–20)
BUN/CREAT SERPL: 12 (ref 12–20)
CA-I BLD-MCNC: 10.2 MG/DL (ref 8.5–10.1)
CHLORIDE SERPL-SCNC: 97 MMOL/L (ref 97–108)
CO2 SERPL-SCNC: 27 MMOL/L (ref 21–32)
CREAT SERPL-MCNC: 1.54 MG/DL (ref 0.7–1.3)
DIFFERENTIAL METHOD BLD: ABNORMAL
EOSINOPHIL # BLD: 0.1 K/UL (ref 0–0.4)
EOSINOPHIL NFR BLD: 1 % (ref 0–7)
ERYTHROCYTE [DISTWIDTH] IN BLOOD BY AUTOMATED COUNT: 13.1 % (ref 11.5–14.5)
GLOBULIN SER CALC-MCNC: 4.5 G/DL (ref 2–4)
GLUCOSE BLD STRIP.AUTO-MCNC: 240 MG/DL (ref 65–100)
GLUCOSE BLD STRIP.AUTO-MCNC: 369 MG/DL (ref 65–100)
GLUCOSE SERPL-MCNC: 365 MG/DL (ref 65–100)
HCT VFR BLD AUTO: 41 % (ref 36.6–50.3)
HGB BLD-MCNC: 14.2 G/DL (ref 12.1–17)
IMM GRANULOCYTES # BLD AUTO: 0 K/UL (ref 0–0.04)
IMM GRANULOCYTES NFR BLD AUTO: 0 % (ref 0–0.5)
LYMPHOCYTES # BLD: 3.2 K/UL (ref 0.8–3.5)
LYMPHOCYTES NFR BLD: 40 % (ref 12–49)
MCH RBC QN AUTO: 29.6 PG (ref 26–34)
MCHC RBC AUTO-ENTMCNC: 34.6 G/DL (ref 30–36.5)
MCV RBC AUTO: 85.4 FL (ref 80–99)
MONOCYTES # BLD: 0.4 K/UL (ref 0–1)
MONOCYTES NFR BLD: 5 % (ref 5–13)
NEUTS SEG # BLD: 4.2 K/UL (ref 1.8–8)
NEUTS SEG NFR BLD: 54 % (ref 32–75)
NRBC # BLD: 0 K/UL (ref 0–0.01)
NRBC BLD-RTO: 0 PER 100 WBC
PERFORMED BY:: ABNORMAL
PERFORMED BY:: ABNORMAL
PLATELET # BLD AUTO: 469 K/UL (ref 150–400)
PMV BLD AUTO: 9.5 FL (ref 8.9–12.9)
POTASSIUM SERPL-SCNC: 4.2 MMOL/L (ref 3.5–5.1)
PROT SERPL-MCNC: 8.4 G/DL (ref 6.4–8.2)
RBC # BLD AUTO: 4.8 M/UL (ref 4.1–5.7)
SODIUM SERPL-SCNC: 135 MMOL/L (ref 136–145)
WBC # BLD AUTO: 8 K/UL (ref 4.1–11.1)

## 2024-02-17 PROCEDURE — 36415 COLL VENOUS BLD VENIPUNCTURE: CPT

## 2024-02-17 PROCEDURE — 85025 COMPLETE CBC W/AUTO DIFF WBC: CPT

## 2024-02-17 PROCEDURE — 2580000003 HC RX 258: Performed by: EMERGENCY MEDICINE

## 2024-02-17 PROCEDURE — 80053 COMPREHEN METABOLIC PANEL: CPT

## 2024-02-17 PROCEDURE — 6370000000 HC RX 637 (ALT 250 FOR IP): Performed by: EMERGENCY MEDICINE

## 2024-02-17 PROCEDURE — 96361 HYDRATE IV INFUSION ADD-ON: CPT

## 2024-02-17 PROCEDURE — 96374 THER/PROPH/DIAG INJ IV PUSH: CPT

## 2024-02-17 PROCEDURE — 99284 EMERGENCY DEPT VISIT MOD MDM: CPT

## 2024-02-17 PROCEDURE — 82962 GLUCOSE BLOOD TEST: CPT

## 2024-02-17 RX ORDER — GLIPIZIDE 5 MG/1
5 TABLET, FILM COATED, EXTENDED RELEASE ORAL DAILY
Qty: 30 TABLET | Refills: 0 | Status: SHIPPED | OUTPATIENT
Start: 2024-02-17

## 2024-02-17 RX ORDER — 0.9 % SODIUM CHLORIDE 0.9 %
1000 INTRAVENOUS SOLUTION INTRAVENOUS ONCE
Status: COMPLETED | OUTPATIENT
Start: 2024-02-17 | End: 2024-02-17

## 2024-02-17 RX ADMIN — INSULIN HUMAN 6 UNITS: 100 INJECTION, SOLUTION PARENTERAL at 18:16

## 2024-02-17 RX ADMIN — SODIUM CHLORIDE 1000 ML: 9 INJECTION, SOLUTION INTRAVENOUS at 18:16

## 2024-02-17 ASSESSMENT — PAIN SCALES - GENERAL
PAINLEVEL_OUTOF10: 0
PAINLEVEL_OUTOF10: 0

## 2024-02-17 ASSESSMENT — LIFESTYLE VARIABLES
HOW MANY STANDARD DRINKS CONTAINING ALCOHOL DO YOU HAVE ON A TYPICAL DAY: PATIENT DOES NOT DRINK
HOW OFTEN DO YOU HAVE A DRINK CONTAINING ALCOHOL: NEVER

## 2024-02-17 ASSESSMENT — PAIN - FUNCTIONAL ASSESSMENT
PAIN_FUNCTIONAL_ASSESSMENT: 0-10
PAIN_FUNCTIONAL_ASSESSMENT: 0-10

## 2024-02-17 NOTE — ED TRIAGE NOTES
Patient arrives from home with wife for elevated blood glucose for the past week. Patient reports blood glucose has been greater than 500 this past week and is experiencing blurred vision, increased thirst and increased urination. Patient is not currently taking insulin.

## 2024-02-18 NOTE — ED PROVIDER NOTES
[facility] EMERGENCY DEPT  EMERGENCY DEPARTMENT ENCOUNTER      Pt Name: Humza Briscoe  MRN: [id number]  Birthdate 1985  Date of evaluation: 2/17/2024  Provider: Valdemar Ordaz MD    CHIEF COMPLAINT       Chief Complaint   Patient presents with    Hyperglycemia         HISTORY OF PRESENT ILLNESS   (Location/Symptom, Timing/Onset, Context/Setting, Quality, Duration, Modifying Factors, Severity)  Note limiting factors.   Humza Briscoe is a 38 y.o. male who presents to the emergency department elevated glucose-recent diagnosis of diabetes.  Patient states he began to feel bad about a month ago was evaluated in a doctor's office approximately 5 days ago when his glucose was 560 began on metformin 500 mg twice daily he is continue to feel bad with mild blurry vision increased thirst and urine output.  His physician scheduled weeks reseen in 1 month March 16, 2024.  He said no fever denies chest abdominal or other pain.  Increased urinary frequency though no dysuria apparently glucose elevated at home by his mother's glucometer    HPI    Nursing Notes were reviewed.    REVIEW OF SYSTEMS    (2-9 systems for level 4, 10 or more for level 5)     Review of Systems   Constitutional:  Positive for fatigue and fever. Negative for activity change, appetite change, chills, diaphoresis and unexpected weight change.   HENT: Negative.     Eyes: Negative.    Respiratory: Negative.     Cardiovascular: Negative.    Endocrine: Negative.    Genitourinary:  Positive for frequency. Negative for difficulty urinating, dysuria, enuresis, flank pain, hematuria, penile discharge and penile pain.   Musculoskeletal: Negative.    Skin: Negative.    Allergic/Immunologic: Negative.    Neurological: Negative.    Hematological: Negative.    Psychiatric/Behavioral: Negative.         Except as noted above the remainder of the review of systems was reviewed and negative.       PAST MEDICAL HISTORY     Past Medical History:   Diagnosis Date    HTN (hypertension)

## 2024-03-03 ENCOUNTER — HOSPITAL ENCOUNTER (EMERGENCY)
Facility: HOSPITAL | Age: 39
Discharge: HOME | End: 2024-03-04
Attending: EMERGENCY MEDICINE
Payer: COMMERCIAL

## 2024-03-03 DIAGNOSIS — R73.9 HYPERGLYCEMIA: Primary | ICD-10-CM

## 2024-03-03 LAB
ALBUMIN SERPL-MCNC: 4 G/DL (ref 3.5–5)
ALBUMIN/GLOB SERPL: 0.9 (ref 1.1–2.2)
ALP SERPL-CCNC: 143 U/L (ref 45–117)
ALT SERPL-CCNC: 40 U/L (ref 12–78)
ANION GAP SERPL CALC-SCNC: 14 MMOL/L (ref 5–15)
AST SERPL W P-5'-P-CCNC: 18 U/L (ref 15–37)
BASOPHILS # BLD: 0 K/UL (ref 0–0.1)
BASOPHILS NFR BLD: 1 % (ref 0–1)
BILIRUB SERPL-MCNC: 0.5 MG/DL (ref 0.2–1)
BUN SERPL-MCNC: 17 MG/DL (ref 6–20)
BUN/CREAT SERPL: 12 (ref 12–20)
CA-I BLD-MCNC: 10.4 MG/DL (ref 8.5–10.1)
CHLORIDE SERPL-SCNC: 93 MMOL/L (ref 97–108)
CO2 SERPL-SCNC: 23 MMOL/L (ref 21–32)
CREAT SERPL-MCNC: 1.43 MG/DL (ref 0.7–1.3)
DIFFERENTIAL METHOD BLD: ABNORMAL
EOSINOPHIL # BLD: 0.1 K/UL (ref 0–0.4)
EOSINOPHIL NFR BLD: 1 % (ref 0–7)
ERYTHROCYTE [DISTWIDTH] IN BLOOD BY AUTOMATED COUNT: 13.2 % (ref 11.5–14.5)
GLOBULIN SER CALC-MCNC: 4.5 G/DL (ref 2–4)
GLUCOSE BLD STRIP.AUTO-MCNC: 263 MG/DL (ref 65–100)
GLUCOSE SERPL-MCNC: 631 MG/DL (ref 65–100)
HCT VFR BLD AUTO: 43.9 % (ref 36.6–50.3)
HGB BLD-MCNC: 14.9 G/DL (ref 12.1–17)
IMM GRANULOCYTES # BLD AUTO: 0 K/UL (ref 0–0.04)
IMM GRANULOCYTES NFR BLD AUTO: 0 % (ref 0–0.5)
LIPASE SERPL-CCNC: 65 U/L (ref 13–75)
LYMPHOCYTES # BLD: 3.1 K/UL (ref 0.8–3.5)
LYMPHOCYTES NFR BLD: 42 % (ref 12–49)
MCH RBC QN AUTO: 29.2 PG (ref 26–34)
MCHC RBC AUTO-ENTMCNC: 33.9 G/DL (ref 30–36.5)
MCV RBC AUTO: 85.9 FL (ref 80–99)
MONOCYTES # BLD: 0.4 K/UL (ref 0–1)
MONOCYTES NFR BLD: 6 % (ref 5–13)
NEUTS SEG # BLD: 3.8 K/UL (ref 1.8–8)
NEUTS SEG NFR BLD: 50 % (ref 32–75)
NRBC # BLD: 0 K/UL (ref 0–0.01)
NRBC BLD-RTO: 0 PER 100 WBC
PERFORMED BY:: ABNORMAL
PLATELET # BLD AUTO: 454 K/UL (ref 150–400)
PMV BLD AUTO: 9.9 FL (ref 8.9–12.9)
POTASSIUM SERPL-SCNC: 4.4 MMOL/L (ref 3.5–5.1)
PROT SERPL-MCNC: 8.5 G/DL (ref 6.4–8.2)
RBC # BLD AUTO: 5.11 M/UL (ref 4.1–5.7)
SODIUM SERPL-SCNC: 130 MMOL/L (ref 136–145)
WBC # BLD AUTO: 7.4 K/UL (ref 4.1–11.1)

## 2024-03-03 PROCEDURE — 83690 ASSAY OF LIPASE: CPT

## 2024-03-03 PROCEDURE — 82962 GLUCOSE BLOOD TEST: CPT

## 2024-03-03 PROCEDURE — C9113 INJ PANTOPRAZOLE SODIUM, VIA: HCPCS | Performed by: EMERGENCY MEDICINE

## 2024-03-03 PROCEDURE — 96375 TX/PRO/DX INJ NEW DRUG ADDON: CPT

## 2024-03-03 PROCEDURE — 99284 EMERGENCY DEPT VISIT MOD MDM: CPT

## 2024-03-03 PROCEDURE — 80053 COMPREHEN METABOLIC PANEL: CPT

## 2024-03-03 PROCEDURE — A4216 STERILE WATER/SALINE, 10 ML: HCPCS | Performed by: EMERGENCY MEDICINE

## 2024-03-03 PROCEDURE — 6370000000 HC RX 637 (ALT 250 FOR IP): Performed by: EMERGENCY MEDICINE

## 2024-03-03 PROCEDURE — 2580000003 HC RX 258: Performed by: EMERGENCY MEDICINE

## 2024-03-03 PROCEDURE — 96365 THER/PROPH/DIAG IV INF INIT: CPT

## 2024-03-03 PROCEDURE — 85025 COMPLETE CBC W/AUTO DIFF WBC: CPT

## 2024-03-03 PROCEDURE — 36415 COLL VENOUS BLD VENIPUNCTURE: CPT

## 2024-03-03 PROCEDURE — 80048 BASIC METABOLIC PNL TOTAL CA: CPT

## 2024-03-03 PROCEDURE — 96361 HYDRATE IV INFUSION ADD-ON: CPT

## 2024-03-03 PROCEDURE — 6360000002 HC RX W HCPCS: Performed by: EMERGENCY MEDICINE

## 2024-03-03 RX ORDER — 0.9 % SODIUM CHLORIDE 0.9 %
1000 INTRAVENOUS SOLUTION INTRAVENOUS
Status: COMPLETED | OUTPATIENT
Start: 2024-03-03 | End: 2024-03-03

## 2024-03-03 RX ORDER — POTASSIUM CHLORIDE 7.45 MG/ML
10 INJECTION INTRAVENOUS
Status: COMPLETED | OUTPATIENT
Start: 2024-03-03 | End: 2024-03-03

## 2024-03-03 RX ADMIN — POTASSIUM CHLORIDE 10 MEQ: 7.46 INJECTION, SOLUTION INTRAVENOUS at 21:42

## 2024-03-03 RX ADMIN — PANTOPRAZOLE SODIUM 40 MG: 40 INJECTION, POWDER, FOR SOLUTION INTRAVENOUS at 20:59

## 2024-03-03 RX ADMIN — SODIUM CHLORIDE 1000 ML: 9 INJECTION, SOLUTION INTRAVENOUS at 20:59

## 2024-03-03 RX ADMIN — INSULIN HUMAN 14 UNITS: 100 INJECTION, SOLUTION PARENTERAL at 22:46

## 2024-03-03 ASSESSMENT — PAIN DESCRIPTION - LOCATION: LOCATION: ABDOMEN

## 2024-03-03 ASSESSMENT — PAIN DESCRIPTION - PAIN TYPE: TYPE: ACUTE PAIN

## 2024-03-03 ASSESSMENT — PAIN SCALES - GENERAL: PAINLEVEL_OUTOF10: 7

## 2024-03-04 VITALS
SYSTOLIC BLOOD PRESSURE: 159 MMHG | RESPIRATION RATE: 16 BRPM | HEART RATE: 94 BPM | OXYGEN SATURATION: 97 % | DIASTOLIC BLOOD PRESSURE: 93 MMHG | BODY MASS INDEX: 45.1 KG/M2 | TEMPERATURE: 98.2 F | WEIGHT: 315 LBS | HEIGHT: 70 IN

## 2024-03-04 LAB
ANION GAP SERPL CALC-SCNC: 8 MMOL/L (ref 5–15)
BUN SERPL-MCNC: 15 MG/DL (ref 6–20)
BUN/CREAT SERPL: 11 (ref 12–20)
CA-I BLD-MCNC: 10.4 MG/DL (ref 8.5–10.1)
CHLORIDE SERPL-SCNC: 102 MMOL/L (ref 97–108)
CO2 SERPL-SCNC: 29 MMOL/L (ref 21–32)
CREAT SERPL-MCNC: 1.38 MG/DL (ref 0.7–1.3)
GLUCOSE SERPL-MCNC: 278 MG/DL (ref 65–100)
POTASSIUM SERPL-SCNC: 4.1 MMOL/L (ref 3.5–5.1)
SODIUM SERPL-SCNC: 139 MMOL/L (ref 136–145)

## 2024-03-04 ASSESSMENT — PAIN - FUNCTIONAL ASSESSMENT: PAIN_FUNCTIONAL_ASSESSMENT: 0-10

## 2024-03-04 ASSESSMENT — PAIN SCALES - GENERAL: PAINLEVEL_OUTOF10: 0

## 2024-03-04 ASSESSMENT — ENCOUNTER SYMPTOMS
NAUSEA: 1
ABDOMINAL PAIN: 1
RESPIRATORY NEGATIVE: 1

## 2024-03-04 NOTE — ED NOTES
[facility] EMERGENCY DEPT  EMERGENCY DEPARTMENT ENCOUNTER      Pt Name: Humza Briscoe  MRN: [id number]  Birthdate 1985  Date of evaluation: 3/3/2024  Provider: Sukhwinder Woods MD  8:39 PM    CHIEF COMPLAINT       Chief Complaint   Patient presents with    Abdominal Pain         HISTORY OF PRESENT ILLNESS    Humza Briscoe is a 38 y.o. male with history of diabetes, hypertension, who presents to the emergency department with complaint of epigastric abdominal pain that started this morning.  Pain does not radiate.  He vomited once today.  He has not had any alcohol in 1 month.  He stopped smoking this year.   He denies NSAID use.      HPI    Nursing Notes were reviewed.    REVIEW OF SYSTEMS       Review of Systems   Constitutional: Negative.    HENT: Negative.     Respiratory: Negative.     Gastrointestinal:  Positive for abdominal pain and nausea.   Endocrine: Positive for polyuria.   Genitourinary: Negative.    Musculoskeletal: Negative.    Neurological: Negative.    Hematological: Negative.    Psychiatric/Behavioral: Negative.         Except as noted above the remainder of the review of systems was reviewed and negative.       PAST MEDICAL HISTORY     Past Medical History:   Diagnosis Date    HTN (hypertension)     Kidney stone          SURGICAL HISTORY       Past Surgical History:   Procedure Laterality Date    LITHOTRIPSY  01/20/2021    Extracorporeal shockwave lithotripsy    ORTHOPEDIC SURGERY Right     Thumb reattachment    SHOULDER ARTHROSCOPY           CURRENT MEDICATIONS       Previous Medications    AMLODIPINE (NORVASC) 10 MG TABLET    Take 1 tablet by mouth nightly    GLIPIZIDE (GLUCOTROL XL) 5 MG EXTENDED RELEASE TABLET    Take 1 tablet by mouth daily    LOSARTAN (COZAAR) 100 MG TABLET    Take 1 tablet by mouth daily       ALLERGIES     Patient has no known allergies.    FAMILY HISTORY       Family History   Problem Relation Age of Onset    Hypertension Mother     No Known Problems Father     Stroke Maternal

## 2024-03-07 ENCOUNTER — APPOINTMENT (OUTPATIENT)
Facility: HOSPITAL | Age: 39
DRG: 639 | End: 2024-03-07
Payer: COMMERCIAL

## 2024-03-07 ENCOUNTER — HOSPITAL ENCOUNTER (EMERGENCY)
Facility: HOSPITAL | Age: 39
Discharge: HOME | DRG: 639 | End: 2024-03-07
Attending: EMERGENCY MEDICINE
Payer: COMMERCIAL

## 2024-03-07 VITALS
SYSTOLIC BLOOD PRESSURE: 154 MMHG | TEMPERATURE: 98.1 F | RESPIRATION RATE: 18 BRPM | WEIGHT: 293 LBS | HEART RATE: 94 BPM | BODY MASS INDEX: 41.02 KG/M2 | DIASTOLIC BLOOD PRESSURE: 99 MMHG | OXYGEN SATURATION: 100 % | HEIGHT: 71 IN

## 2024-03-07 DIAGNOSIS — R11.2 NAUSEA AND VOMITING, UNSPECIFIED VOMITING TYPE: Primary | ICD-10-CM

## 2024-03-07 LAB
ALBUMIN SERPL-MCNC: 4 G/DL (ref 3.5–5)
ALBUMIN/GLOB SERPL: 0.9 (ref 1.1–2.2)
ALP SERPL-CCNC: 140 U/L (ref 45–117)
ALT SERPL-CCNC: 39 U/L (ref 12–78)
ANION GAP SERPL CALC-SCNC: 12 MMOL/L (ref 5–15)
AST SERPL W P-5'-P-CCNC: 18 U/L (ref 15–37)
BASOPHILS # BLD: 0 K/UL (ref 0–0.1)
BASOPHILS NFR BLD: 1 % (ref 0–1)
BILIRUB SERPL-MCNC: 0.5 MG/DL (ref 0.2–1)
BUN SERPL-MCNC: 18 MG/DL (ref 6–20)
BUN/CREAT SERPL: 13 (ref 12–20)
CA-I BLD-MCNC: 9.9 MG/DL (ref 8.5–10.1)
CHLORIDE SERPL-SCNC: 105 MMOL/L (ref 97–108)
CO2 SERPL-SCNC: 17 MMOL/L (ref 21–32)
CREAT SERPL-MCNC: 1.37 MG/DL (ref 0.7–1.3)
DIFFERENTIAL METHOD BLD: ABNORMAL
EOSINOPHIL # BLD: 0 K/UL (ref 0–0.4)
EOSINOPHIL NFR BLD: 0 % (ref 0–7)
ERYTHROCYTE [DISTWIDTH] IN BLOOD BY AUTOMATED COUNT: 13.3 % (ref 11.5–14.5)
GLOBULIN SER CALC-MCNC: 4.5 G/DL (ref 2–4)
GLUCOSE BLD STRIP.AUTO-MCNC: 312 MG/DL (ref 65–100)
GLUCOSE SERPL-MCNC: 387 MG/DL (ref 65–100)
HCT VFR BLD AUTO: 46.4 % (ref 36.6–50.3)
HGB BLD-MCNC: 15.4 G/DL (ref 12.1–17)
IMM GRANULOCYTES # BLD AUTO: 0 K/UL (ref 0–0.04)
IMM GRANULOCYTES NFR BLD AUTO: 0 % (ref 0–0.5)
LYMPHOCYTES # BLD: 2.4 K/UL (ref 0.8–3.5)
LYMPHOCYTES NFR BLD: 34 % (ref 12–49)
MCH RBC QN AUTO: 29.1 PG (ref 26–34)
MCHC RBC AUTO-ENTMCNC: 33.2 G/DL (ref 30–36.5)
MCV RBC AUTO: 87.5 FL (ref 80–99)
MONOCYTES # BLD: 0.5 K/UL (ref 0–1)
MONOCYTES NFR BLD: 7 % (ref 5–13)
NEUTS SEG # BLD: 4.2 K/UL (ref 1.8–8)
NEUTS SEG NFR BLD: 58 % (ref 32–75)
NRBC # BLD: 0 K/UL (ref 0–0.01)
NRBC BLD-RTO: 0 PER 100 WBC
PERFORMED BY:: ABNORMAL
PLATELET # BLD AUTO: 453 K/UL (ref 150–400)
PMV BLD AUTO: 10.1 FL (ref 8.9–12.9)
POTASSIUM SERPL-SCNC: 4.2 MMOL/L (ref 3.5–5.1)
PROT SERPL-MCNC: 8.5 G/DL (ref 6.4–8.2)
RBC # BLD AUTO: 5.3 M/UL (ref 4.1–5.7)
SODIUM SERPL-SCNC: 134 MMOL/L (ref 136–145)
WBC # BLD AUTO: 7.2 K/UL (ref 4.1–11.1)

## 2024-03-07 PROCEDURE — 96374 THER/PROPH/DIAG INJ IV PUSH: CPT

## 2024-03-07 PROCEDURE — 6360000002 HC RX W HCPCS: Performed by: EMERGENCY MEDICINE

## 2024-03-07 PROCEDURE — 99284 EMERGENCY DEPT VISIT MOD MDM: CPT

## 2024-03-07 PROCEDURE — 96361 HYDRATE IV INFUSION ADD-ON: CPT

## 2024-03-07 PROCEDURE — 36415 COLL VENOUS BLD VENIPUNCTURE: CPT

## 2024-03-07 PROCEDURE — 2580000003 HC RX 258: Performed by: EMERGENCY MEDICINE

## 2024-03-07 PROCEDURE — 85025 COMPLETE CBC W/AUTO DIFF WBC: CPT

## 2024-03-07 PROCEDURE — 74018 RADEX ABDOMEN 1 VIEW: CPT

## 2024-03-07 PROCEDURE — 80053 COMPREHEN METABOLIC PANEL: CPT

## 2024-03-07 PROCEDURE — 96375 TX/PRO/DX INJ NEW DRUG ADDON: CPT

## 2024-03-07 PROCEDURE — 82962 GLUCOSE BLOOD TEST: CPT

## 2024-03-07 RX ORDER — ONDANSETRON 4 MG/1
4 TABLET, ORALLY DISINTEGRATING ORAL 3 TIMES DAILY PRN
Qty: 21 TABLET | Refills: 0 | Status: SHIPPED | OUTPATIENT
Start: 2024-03-07

## 2024-03-07 RX ORDER — ONDANSETRON 2 MG/ML
4 INJECTION INTRAMUSCULAR; INTRAVENOUS ONCE
Status: COMPLETED | OUTPATIENT
Start: 2024-03-07 | End: 2024-03-07

## 2024-03-07 RX ORDER — 0.9 % SODIUM CHLORIDE 0.9 %
1000 INTRAVENOUS SOLUTION INTRAVENOUS ONCE
Status: COMPLETED | OUTPATIENT
Start: 2024-03-07 | End: 2024-03-07

## 2024-03-07 RX ORDER — MORPHINE SULFATE 4 MG/ML
4 INJECTION, SOLUTION INTRAMUSCULAR; INTRAVENOUS
Status: COMPLETED | OUTPATIENT
Start: 2024-03-07 | End: 2024-03-07

## 2024-03-07 RX ADMIN — ONDANSETRON 4 MG: 2 INJECTION INTRAMUSCULAR; INTRAVENOUS at 17:28

## 2024-03-07 RX ADMIN — SODIUM CHLORIDE 1000 ML: 9 INJECTION, SOLUTION INTRAVENOUS at 17:27

## 2024-03-07 RX ADMIN — MORPHINE SULFATE 4 MG: 4 INJECTION, SOLUTION INTRAMUSCULAR; INTRAVENOUS at 17:27

## 2024-03-07 ASSESSMENT — PAIN DESCRIPTION - LOCATION: LOCATION: ABDOMEN

## 2024-03-07 ASSESSMENT — PAIN SCALES - GENERAL
PAINLEVEL_OUTOF10: 10
PAINLEVEL_OUTOF10: 0

## 2024-03-07 NOTE — ED PROVIDER NOTES
[facility] EMERGENCY DEPT  EMERGENCY DEPARTMENT HISTORY AND PHYSICAL EXAM      Date: 3/7/2024  Patient Name: Humza Briscoe  MRN: [id number]  Birthdate 1985  Date of evaluation: 3/7/2024  Provider: Bryce Wiggins MD   Note Started: 5:07 PM EST 3/7/24    HISTORY OF PRESENT ILLNESS     Chief Complaint   Patient presents with    Abdominal Pain    Emesis       History Provided By: Patient    HPI: Humza Briscoe is a 38 y.o. male with known past medical history significant for hypertension presents with mild diffuse abdominal discomfort with more associated nausea and vomiting that began early this morning.  He says has been able to keep anything down.  He has not treated with anything and is failed to improve.    He denies any fever, chills, chest pain, shortness of breath, rash, diarrhea, headache, night sweats.    PAST MEDICAL HISTORY   Past Medical History:  Past Medical History:   Diagnosis Date    HTN (hypertension)     Kidney stone        Past Surgical History:  Past Surgical History:   Procedure Laterality Date    LITHOTRIPSY  01/20/2021    Extracorporeal shockwave lithotripsy    ORTHOPEDIC SURGERY Right     Thumb reattachment    SHOULDER ARTHROSCOPY         Family History:  Family History   Problem Relation Age of Onset    Hypertension Mother     No Known Problems Father     Stroke Maternal Grandmother     Diabetes Maternal Grandmother     Hypertension Maternal Grandmother     Cancer Paternal Grandmother        Social History:  Social History     Tobacco Use    Smoking status: Former     Types: Cigars     Passive exposure: Never    Smokeless tobacco: Never   Vaping Use    Vaping Use: Never used   Substance Use Topics    Alcohol use: Yes     Alcohol/week: 2.0 standard drinks of alcohol    Drug use: Not Currently     Types: Marijuana (Weed)       Allergies:  No Known Allergies    PCP: Karie Bull FNP    Current Meds:   No current facility-administered medications for this encounter.     Current Outpatient  IntraVENous Given 3/7/24 1727)   ondansetron (ZOFRAN) injection 4 mg (4 mg IntraVENous Given 3/7/24 1728)   sodium chloride 0.9 % bolus 1,000 mL (1,000 mLs IntraVENous New Bag 3/7/24 1727)       CONSULTS: See ED Course/MDM for further details.  None     Social Determinants affecting Diagnosis/Treatment: None    Smoking Cessation: Not Applicable    PROCEDURES   Unless otherwise noted above, none  Procedures      CRITICAL CARE TIME   Patient does not meet Critical Care Time, 0 minutes    ED IMPRESSION     1. Nausea and vomiting, unspecified vomiting type          DISPOSITION/PLAN   DISPOSITION Decision To Discharge 03/07/2024 08:31:41 PM    Discharge Note: The patient is stable for discharge home. The signs, symptoms, diagnosis, and discharge instructions have been discussed, understanding conveyed, and agreed upon. The patient is to follow up as recommended or return to ER should their symptoms worsen.      PATIENT REFERRED TO:  Karie Bull FNP  [address]  [place] [id number] [phone]    Call in 2 days          DISCHARGE MEDICATIONS:     Medication List        START taking these medications      ondansetron 4 MG disintegrating tablet  Commonly known as: ZOFRAN-ODT  Take 1 tablet by mouth 3 times daily as needed for Nausea or Vomiting            ASK your doctor about these medications      amLODIPine 10 MG tablet  Commonly known as: NORVASC  Take 1 tablet by mouth nightly     glipiZIDE 5 MG extended release tablet  Commonly known as: Glucotrol XL  Take 1 tablet by mouth daily     losartan 100 MG tablet  Commonly known as: COZAAR  Take 1 tablet by mouth daily               Where to Get Your Medications        These medications were sent to [facility]/pharmacy #[id number] - [place], [place] - [address] [place] [address] - P [phone] - F [phone]  [address] [place] [id number]      Phone: [phone]   ondansetron 4 MG disintegrating tablet           DISCONTINUED MEDICATIONS:  Current Discharge

## 2024-03-09 ENCOUNTER — APPOINTMENT (OUTPATIENT)
Facility: HOSPITAL | Age: 39
End: 2024-03-09
Payer: COMMERCIAL

## 2024-03-09 ENCOUNTER — HOSPITAL ENCOUNTER (EMERGENCY)
Facility: HOSPITAL | Age: 39
Discharge: ANOTHER HOSPITAL | End: 2024-03-10
Attending: EMERGENCY MEDICINE
Payer: COMMERCIAL

## 2024-03-09 VITALS
BODY MASS INDEX: 39.77 KG/M2 | HEART RATE: 103 BPM | WEIGHT: 284.1 LBS | HEIGHT: 71 IN | SYSTOLIC BLOOD PRESSURE: 146 MMHG | RESPIRATION RATE: 17 BRPM | DIASTOLIC BLOOD PRESSURE: 110 MMHG | OXYGEN SATURATION: 97 % | TEMPERATURE: 97.2 F

## 2024-03-09 DIAGNOSIS — E11.10 DIABETIC KETOACIDOSIS WITHOUT COMA ASSOCIATED WITH TYPE 2 DIABETES MELLITUS (HCC): Primary | ICD-10-CM

## 2024-03-09 LAB
ALBUMIN SERPL-MCNC: 4.1 G/DL (ref 3.5–5)
ALBUMIN/GLOB SERPL: 0.8 (ref 1.1–2.2)
ALP SERPL-CCNC: 159 U/L (ref 45–117)
ALT SERPL-CCNC: 42 U/L (ref 12–78)
ANION GAP SERPL CALC-SCNC: 18 MMOL/L (ref 5–15)
APPEARANCE UR: CLEAR
ARTERIAL PATENCY WRIST A: YES
AST SERPL W P-5'-P-CCNC: 12 U/L (ref 15–37)
BACTERIA URNS QL MICRO: ABNORMAL /HPF
BASE DEFICIT BLDA-SCNC: 8.9 MMOL/L
BASOPHILS # BLD: 0.1 K/UL (ref 0–0.1)
BASOPHILS NFR BLD: 1 % (ref 0–1)
BDY SITE: ABNORMAL
BILIRUB SERPL-MCNC: 0.5 MG/DL (ref 0.2–1)
BILIRUB UR QL: NEGATIVE
BUN SERPL-MCNC: 16 MG/DL (ref 6–20)
BUN/CREAT SERPL: 9 (ref 12–20)
CA-I BLD-MCNC: 10.4 MG/DL (ref 8.5–10.1)
CHLORIDE SERPL-SCNC: 97 MMOL/L (ref 97–108)
CO2 SERPL-SCNC: 18 MMOL/L (ref 21–32)
COHGB MFR BLD: 0.7 % (ref 1–2)
COLOR UR: ABNORMAL
CREAT SERPL-MCNC: 1.73 MG/DL (ref 0.7–1.3)
DIFFERENTIAL METHOD BLD: ABNORMAL
EKG ATRIAL RATE: 104 BPM
EKG DIAGNOSIS: NORMAL
EKG P AXIS: 52 DEGREES
EKG P-R INTERVAL: 158 MS
EKG Q-T INTERVAL: 325 MS
EKG QRS DURATION: 93 MS
EKG QTC CALCULATION (BAZETT): 428 MS
EKG R AXIS: -52 DEGREES
EKG T AXIS: -3 DEGREES
EKG VENTRICULAR RATE: 104 BPM
EOSINOPHIL # BLD: 0 K/UL (ref 0–0.4)
EOSINOPHIL NFR BLD: 0 % (ref 0–7)
ERYTHROCYTE [DISTWIDTH] IN BLOOD BY AUTOMATED COUNT: 13.9 % (ref 11.5–14.5)
FIO2 ON VENT: 21 %
GLOBULIN SER CALC-MCNC: 4.9 G/DL (ref 2–4)
GLUCOSE BLD STRIP.AUTO-MCNC: 226 MG/DL (ref 65–100)
GLUCOSE BLD STRIP.AUTO-MCNC: 341 MG/DL (ref 65–100)
GLUCOSE BLD STRIP.AUTO-MCNC: 493 MG/DL (ref 65–100)
GLUCOSE SERPL-MCNC: 756 MG/DL (ref 65–100)
GLUCOSE UR STRIP.AUTO-MCNC: >1000 MG/DL
HCO3 BLDA-SCNC: 17 MMOL/L (ref 22–26)
HCT VFR BLD AUTO: 48.6 % (ref 36.6–50.3)
HGB BLD-MCNC: 16.4 G/DL (ref 12.1–17)
HGB UR QL STRIP: ABNORMAL
IMM GRANULOCYTES # BLD AUTO: 0 K/UL (ref 0–0.04)
IMM GRANULOCYTES NFR BLD AUTO: 0 % (ref 0–0.5)
KETONES SERPL QL: POSITIVE
KETONES UR QL STRIP.AUTO: 40 MG/DL
LEUKOCYTE ESTERASE UR QL STRIP.AUTO: NEGATIVE
LIPASE SERPL-CCNC: 55 U/L (ref 13–75)
LYMPHOCYTES # BLD: 2.3 K/UL (ref 0.8–3.5)
LYMPHOCYTES NFR BLD: 29 % (ref 12–49)
MCH RBC QN AUTO: 29.2 PG (ref 26–34)
MCHC RBC AUTO-ENTMCNC: 33.7 G/DL (ref 30–36.5)
MCV RBC AUTO: 86.6 FL (ref 80–99)
METHGB MFR BLD: 0.4 % (ref 0–1.4)
MONOCYTES # BLD: 0.7 K/UL (ref 0–1)
MONOCYTES NFR BLD: 8 % (ref 5–13)
NEUTS SEG # BLD: 4.8 K/UL (ref 1.8–8)
NEUTS SEG NFR BLD: 62 % (ref 32–75)
NITRITE UR QL STRIP.AUTO: NEGATIVE
NRBC # BLD: 0 K/UL (ref 0–0.01)
NRBC BLD-RTO: 0 PER 100 WBC
OXYHGB MFR BLD: 94.7 % (ref 95–99)
PCO2 BLDA: 36 MMHG (ref 35–45)
PERFORMED BY:: ABNORMAL
PH BLDA: 7.29 (ref 7.35–7.45)
PH UR STRIP: 6 (ref 5–8)
PLATELET # BLD AUTO: 440 K/UL (ref 150–400)
PMV BLD AUTO: 10.2 FL (ref 8.9–12.9)
PO2 BLDA: 83 MMHG (ref 80–100)
POTASSIUM SERPL-SCNC: 4.4 MMOL/L (ref 3.5–5.1)
PROT SERPL-MCNC: 9 G/DL (ref 6.4–8.2)
PROT UR STRIP-MCNC: 30 MG/DL
RBC # BLD AUTO: 5.61 M/UL (ref 4.1–5.7)
RBC #/AREA URNS HPF: ABNORMAL /HPF (ref 0–3)
SAO2% DEVICE SAO2% SENSOR NAME: ABNORMAL
SODIUM SERPL-SCNC: 133 MMOL/L (ref 136–145)
SP GR UR REFRACTOMETRY: 1.01 (ref 1–1.03)
SPECIMEN SITE: ABNORMAL
UROBILINOGEN UR QL STRIP.AUTO: 0.2 EU/DL (ref 0.2–1)
WBC # BLD AUTO: 7.8 K/UL (ref 4.1–11.1)
WBC URNS QL MICRO: ABNORMAL /HPF (ref 0–5)

## 2024-03-09 PROCEDURE — 82803 BLOOD GASES ANY COMBINATION: CPT

## 2024-03-09 PROCEDURE — 93005 ELECTROCARDIOGRAM TRACING: CPT | Performed by: EMERGENCY MEDICINE

## 2024-03-09 PROCEDURE — 80048 BASIC METABOLIC PNL TOTAL CA: CPT

## 2024-03-09 PROCEDURE — 83036 HEMOGLOBIN GLYCOSYLATED A1C: CPT

## 2024-03-09 PROCEDURE — 96365 THER/PROPH/DIAG IV INF INIT: CPT

## 2024-03-09 PROCEDURE — 81001 URINALYSIS AUTO W/SCOPE: CPT

## 2024-03-09 PROCEDURE — 2580000003 HC RX 258: Performed by: EMERGENCY MEDICINE

## 2024-03-09 PROCEDURE — 83735 ASSAY OF MAGNESIUM: CPT

## 2024-03-09 PROCEDURE — 80053 COMPREHEN METABOLIC PANEL: CPT

## 2024-03-09 PROCEDURE — 99285 EMERGENCY DEPT VISIT HI MDM: CPT

## 2024-03-09 PROCEDURE — 82962 GLUCOSE BLOOD TEST: CPT

## 2024-03-09 PROCEDURE — 74176 CT ABD & PELVIS W/O CONTRAST: CPT

## 2024-03-09 PROCEDURE — 36600 WITHDRAWAL OF ARTERIAL BLOOD: CPT

## 2024-03-09 PROCEDURE — 82009 KETONE BODYS QUAL: CPT

## 2024-03-09 PROCEDURE — 85025 COMPLETE CBC W/AUTO DIFF WBC: CPT

## 2024-03-09 PROCEDURE — 96375 TX/PRO/DX INJ NEW DRUG ADDON: CPT

## 2024-03-09 PROCEDURE — 84100 ASSAY OF PHOSPHORUS: CPT

## 2024-03-09 PROCEDURE — 96366 THER/PROPH/DIAG IV INF ADDON: CPT

## 2024-03-09 PROCEDURE — 83690 ASSAY OF LIPASE: CPT

## 2024-03-09 PROCEDURE — 96376 TX/PRO/DX INJ SAME DRUG ADON: CPT

## 2024-03-09 PROCEDURE — 6360000002 HC RX W HCPCS: Performed by: EMERGENCY MEDICINE

## 2024-03-09 PROCEDURE — 6370000000 HC RX 637 (ALT 250 FOR IP): Performed by: EMERGENCY MEDICINE

## 2024-03-09 RX ORDER — ONDANSETRON 2 MG/ML
4 INJECTION INTRAMUSCULAR; INTRAVENOUS
Status: COMPLETED | OUTPATIENT
Start: 2024-03-09 | End: 2024-03-09

## 2024-03-09 RX ORDER — 0.9 % SODIUM CHLORIDE 0.9 %
15 INTRAVENOUS SOLUTION INTRAVENOUS ONCE
Status: COMPLETED | OUTPATIENT
Start: 2024-03-09 | End: 2024-03-10

## 2024-03-09 RX ORDER — MAGNESIUM SULFATE IN WATER 40 MG/ML
2000 INJECTION, SOLUTION INTRAVENOUS PRN
Status: DISCONTINUED | OUTPATIENT
Start: 2024-03-09 | End: 2024-03-10 | Stop reason: HOSPADM

## 2024-03-09 RX ORDER — SODIUM CHLORIDE 9 MG/ML
INJECTION, SOLUTION INTRAVENOUS CONTINUOUS
Status: DISCONTINUED | OUTPATIENT
Start: 2024-03-09 | End: 2024-03-10 | Stop reason: HOSPADM

## 2024-03-09 RX ORDER — ROSUVASTATIN CALCIUM 20 MG/1
TABLET, COATED ORAL
COMMUNITY
Start: 2024-02-16

## 2024-03-09 RX ORDER — GLIMEPIRIDE 2 MG/1
TABLET ORAL
COMMUNITY
Start: 2024-03-07

## 2024-03-09 RX ORDER — DEXTROSE MONOHYDRATE, SODIUM CHLORIDE, AND POTASSIUM CHLORIDE 50; 1.49; 4.5 G/1000ML; G/1000ML; G/1000ML
INJECTION, SOLUTION INTRAVENOUS CONTINUOUS PRN
Status: DISCONTINUED | OUTPATIENT
Start: 2024-03-09 | End: 2024-03-10 | Stop reason: HOSPADM

## 2024-03-09 RX ORDER — POTASSIUM CHLORIDE 7.45 MG/ML
10 INJECTION INTRAVENOUS PRN
Status: DISCONTINUED | OUTPATIENT
Start: 2024-03-09 | End: 2024-03-10 | Stop reason: HOSPADM

## 2024-03-09 RX ORDER — FENTANYL CITRATE 50 UG/ML
50 INJECTION, SOLUTION INTRAMUSCULAR; INTRAVENOUS
Status: COMPLETED | OUTPATIENT
Start: 2024-03-09 | End: 2024-03-09

## 2024-03-09 RX ADMIN — ONDANSETRON 4 MG: 2 INJECTION INTRAMUSCULAR; INTRAVENOUS at 21:27

## 2024-03-09 RX ADMIN — FENTANYL CITRATE 50 MCG: 50 INJECTION INTRAMUSCULAR; INTRAVENOUS at 21:27

## 2024-03-09 RX ADMIN — SODIUM CHLORIDE 1934 ML: 9 INJECTION, SOLUTION INTRAVENOUS at 22:10

## 2024-03-09 RX ADMIN — INSULIN HUMAN 8 UNITS: 100 INJECTION, SOLUTION PARENTERAL at 22:10

## 2024-03-09 ASSESSMENT — ENCOUNTER SYMPTOMS
ALLERGIC/IMMUNOLOGIC NEGATIVE: 1
VOMITING: 1
NAUSEA: 1
DIARRHEA: 0
CHEST TIGHTNESS: 0
RESPIRATORY NEGATIVE: 1
EYES NEGATIVE: 1
ABDOMINAL PAIN: 1

## 2024-03-09 ASSESSMENT — PAIN SCALES - GENERAL: PAINLEVEL_OUTOF10: 10

## 2024-03-09 ASSESSMENT — PAIN - FUNCTIONAL ASSESSMENT: PAIN_FUNCTIONAL_ASSESSMENT: 0-10

## 2024-03-10 ENCOUNTER — HOSPITAL ENCOUNTER (INPATIENT)
Facility: HOSPITAL | Age: 39
LOS: 3 days | Discharge: HOME | DRG: 639 | End: 2024-03-13
Attending: STUDENT IN AN ORGANIZED HEALTH CARE EDUCATION/TRAINING PROGRAM | Admitting: INTERNAL MEDICINE
Payer: COMMERCIAL

## 2024-03-10 DIAGNOSIS — E10.10 DIABETIC KETOACIDOSIS WITHOUT COMA ASSOCIATED WITH TYPE 1 DIABETES MELLITUS (HCC): Primary | ICD-10-CM

## 2024-03-10 DIAGNOSIS — E13.10 SECONDARY DM WITH DKA (HCC): ICD-10-CM

## 2024-03-10 LAB
ANION GAP SERPL CALC-SCNC: 15 MMOL/L (ref 5–15)
ANION GAP SERPL CALC-SCNC: 5 MMOL/L (ref 5–15)
ANION GAP SERPL CALC-SCNC: 7 MMOL/L (ref 5–15)
ANION GAP SERPL CALC-SCNC: 8 MMOL/L (ref 5–15)
ANION GAP SERPL CALC-SCNC: 9 MMOL/L (ref 5–15)
BASE DEFICIT BLDV-SCNC: 6 MMOL/L
BUN SERPL-MCNC: 13 MG/DL (ref 6–20)
BUN SERPL-MCNC: 14 MG/DL (ref 6–20)
BUN SERPL-MCNC: 15 MG/DL (ref 6–20)
BUN/CREAT SERPL: 10 (ref 12–20)
BUN/CREAT SERPL: 11 (ref 12–20)
BUN/CREAT SERPL: 12 (ref 12–20)
BUN/CREAT SERPL: 12 (ref 12–20)
BUN/CREAT SERPL: 9 (ref 12–20)
CA-I BLD-MCNC: 10 MG/DL (ref 8.5–10.1)
CA-I BLD-MCNC: 10.2 MG/DL (ref 8.5–10.1)
CA-I BLD-MCNC: 10.3 MG/DL (ref 8.5–10.1)
CA-I BLD-MCNC: 10.4 MG/DL (ref 8.5–10.1)
CA-I BLD-MCNC: 8.8 MG/DL (ref 8.5–10.1)
CHLORIDE SERPL-SCNC: 107 MMOL/L (ref 97–108)
CHLORIDE SERPL-SCNC: 108 MMOL/L (ref 97–108)
CHLORIDE SERPL-SCNC: 113 MMOL/L (ref 97–108)
CHLORIDE SERPL-SCNC: 114 MMOL/L (ref 97–108)
CHLORIDE SERPL-SCNC: 116 MMOL/L (ref 97–108)
CO2 SERPL-SCNC: 20 MMOL/L (ref 21–32)
CO2 SERPL-SCNC: 21 MMOL/L (ref 21–32)
CO2 SERPL-SCNC: 21 MMOL/L (ref 21–32)
CO2 SERPL-SCNC: 23 MMOL/L (ref 21–32)
CO2 SERPL-SCNC: 24 MMOL/L (ref 21–32)
CREAT SERPL-MCNC: 1.1 MG/DL (ref 0.7–1.3)
CREAT SERPL-MCNC: 1.27 MG/DL (ref 0.7–1.3)
CREAT SERPL-MCNC: 1.28 MG/DL (ref 0.7–1.3)
CREAT SERPL-MCNC: 1.39 MG/DL (ref 0.7–1.3)
CREAT SERPL-MCNC: 1.64 MG/DL (ref 0.7–1.3)
EST. AVERAGE GLUCOSE BLD GHB EST-MCNC: 324 MG/DL
EST. AVERAGE GLUCOSE BLD GHB EST-MCNC: 326 MG/DL
GLUCOSE BLD STRIP.AUTO-MCNC: 166 MG/DL (ref 65–100)
GLUCOSE BLD STRIP.AUTO-MCNC: 221 MG/DL (ref 65–100)
GLUCOSE BLD STRIP.AUTO-MCNC: 225 MG/DL (ref 65–100)
GLUCOSE BLD STRIP.AUTO-MCNC: 227 MG/DL (ref 65–100)
GLUCOSE BLD STRIP.AUTO-MCNC: 229 MG/DL (ref 65–100)
GLUCOSE BLD STRIP.AUTO-MCNC: 245 MG/DL (ref 65–100)
GLUCOSE BLD STRIP.AUTO-MCNC: 251 MG/DL (ref 65–100)
GLUCOSE BLD STRIP.AUTO-MCNC: 261 MG/DL (ref 65–100)
GLUCOSE BLD STRIP.AUTO-MCNC: 371 MG/DL (ref 65–100)
GLUCOSE BLD STRIP.AUTO-MCNC: 378 MG/DL (ref 65–100)
GLUCOSE BLD STRIP.AUTO-MCNC: 492 MG/DL (ref 65–100)
GLUCOSE SERPL-MCNC: 218 MG/DL (ref 65–100)
GLUCOSE SERPL-MCNC: 269 MG/DL (ref 65–100)
GLUCOSE SERPL-MCNC: 272 MG/DL (ref 65–100)
GLUCOSE SERPL-MCNC: 285 MG/DL (ref 65–100)
GLUCOSE SERPL-MCNC: 340 MG/DL (ref 65–100)
HBA1C MFR BLD: 12.9 % (ref 4–5.6)
HBA1C MFR BLD: 13 % (ref 4–5.6)
HCO3 BLDV-SCNC: 20.7 MMOL/L (ref 22–26)
MAGNESIUM SERPL-MCNC: 1.8 MG/DL (ref 1.6–2.4)
MAGNESIUM SERPL-MCNC: 2 MG/DL (ref 1.6–2.4)
MAGNESIUM SERPL-MCNC: 2.2 MG/DL (ref 1.6–2.4)
PCO2 BLDV: 43.8 MMHG (ref 41–52)
PERFORMED BY:: ABNORMAL
PH BLDV: 7.28 (ref 7.23–7.44)
PHOSPHATE SERPL-MCNC: 2.6 MG/DL (ref 2.6–4.7)
PHOSPHATE SERPL-MCNC: 2.7 MG/DL (ref 2.6–4.7)
PHOSPHATE SERPL-MCNC: 2.9 MG/DL (ref 2.6–4.7)
PHOSPHATE SERPL-MCNC: 3 MG/DL (ref 2.6–4.7)
PHOSPHATE SERPL-MCNC: 3.5 MG/DL (ref 2.6–4.7)
PO2 BLDV: 40 MMHG (ref 25–40)
POTASSIUM SERPL-SCNC: 3.8 MMOL/L (ref 3.5–5.1)
POTASSIUM SERPL-SCNC: 3.8 MMOL/L (ref 3.5–5.1)
POTASSIUM SERPL-SCNC: 3.9 MMOL/L (ref 3.5–5.1)
POTASSIUM SERPL-SCNC: 4.1 MMOL/L (ref 3.5–5.1)
POTASSIUM SERPL-SCNC: 4.2 MMOL/L (ref 3.5–5.1)
SAO2 % BLDV: 68.2 %
SODIUM SERPL-SCNC: 137 MMOL/L (ref 136–145)
SODIUM SERPL-SCNC: 143 MMOL/L (ref 136–145)
SODIUM SERPL-SCNC: 143 MMOL/L (ref 136–145)
SODIUM SERPL-SCNC: 144 MMOL/L (ref 136–145)
SODIUM SERPL-SCNC: 144 MMOL/L (ref 136–145)
SPECIMEN TYPE: ABNORMAL

## 2024-03-10 PROCEDURE — 6370000000 HC RX 637 (ALT 250 FOR IP): Performed by: STUDENT IN AN ORGANIZED HEALTH CARE EDUCATION/TRAINING PROGRAM

## 2024-03-10 PROCEDURE — 84100 ASSAY OF PHOSPHORUS: CPT

## 2024-03-10 PROCEDURE — 2580000003 HC RX 258: Performed by: INTERNAL MEDICINE

## 2024-03-10 PROCEDURE — 83036 HEMOGLOBIN GLYCOSYLATED A1C: CPT

## 2024-03-10 PROCEDURE — 1100000000 HC RM PRIVATE

## 2024-03-10 PROCEDURE — 82962 GLUCOSE BLOOD TEST: CPT

## 2024-03-10 PROCEDURE — 2500000003 HC RX 250 WO HCPCS: Performed by: EMERGENCY MEDICINE

## 2024-03-10 PROCEDURE — 2580000003 HC RX 258: Performed by: STUDENT IN AN ORGANIZED HEALTH CARE EDUCATION/TRAINING PROGRAM

## 2024-03-10 PROCEDURE — 6370000000 HC RX 637 (ALT 250 FOR IP): Performed by: NURSE PRACTITIONER

## 2024-03-10 PROCEDURE — 83735 ASSAY OF MAGNESIUM: CPT

## 2024-03-10 PROCEDURE — 6360000002 HC RX W HCPCS: Performed by: INTERNAL MEDICINE

## 2024-03-10 PROCEDURE — 99285 EMERGENCY DEPT VISIT HI MDM: CPT

## 2024-03-10 PROCEDURE — 6370000000 HC RX 637 (ALT 250 FOR IP): Performed by: INTERNAL MEDICINE

## 2024-03-10 PROCEDURE — 2500000003 HC RX 250 WO HCPCS: Performed by: INTERNAL MEDICINE

## 2024-03-10 PROCEDURE — 80048 BASIC METABOLIC PNL TOTAL CA: CPT

## 2024-03-10 PROCEDURE — 36415 COLL VENOUS BLD VENIPUNCTURE: CPT

## 2024-03-10 RX ORDER — SODIUM CHLORIDE 9 MG/ML
INJECTION, SOLUTION INTRAVENOUS PRN
Status: DISCONTINUED | OUTPATIENT
Start: 2024-03-10 | End: 2024-03-13 | Stop reason: HOSPADM

## 2024-03-10 RX ORDER — DEXTROSE AND SODIUM CHLORIDE 5; .45 G/100ML; G/100ML
INJECTION, SOLUTION INTRAVENOUS CONTINUOUS
Status: DISCONTINUED | OUTPATIENT
Start: 2024-03-10 | End: 2024-03-10

## 2024-03-10 RX ORDER — INSULIN LISPRO 100 [IU]/ML
4 INJECTION, SOLUTION INTRAVENOUS; SUBCUTANEOUS ONCE
Status: COMPLETED | OUTPATIENT
Start: 2024-03-10 | End: 2024-03-10

## 2024-03-10 RX ORDER — ENOXAPARIN SODIUM 100 MG/ML
30 INJECTION SUBCUTANEOUS 2 TIMES DAILY
Status: DISCONTINUED | OUTPATIENT
Start: 2024-03-10 | End: 2024-03-13 | Stop reason: HOSPADM

## 2024-03-10 RX ORDER — SODIUM CHLORIDE 450 MG/100ML
INJECTION, SOLUTION INTRAVENOUS CONTINUOUS
Status: DISCONTINUED | OUTPATIENT
Start: 2024-03-10 | End: 2024-03-10

## 2024-03-10 RX ORDER — ROSUVASTATIN CALCIUM 5 MG/1
20 TABLET, COATED ORAL NIGHTLY
Status: DISCONTINUED | OUTPATIENT
Start: 2024-03-10 | End: 2024-03-10

## 2024-03-10 RX ORDER — LABETALOL HYDROCHLORIDE 5 MG/ML
10 INJECTION, SOLUTION INTRAVENOUS EVERY 4 HOURS PRN
Status: DISCONTINUED | OUTPATIENT
Start: 2024-03-10 | End: 2024-03-13 | Stop reason: HOSPADM

## 2024-03-10 RX ORDER — INSULIN LISPRO 100 [IU]/ML
6 INJECTION, SOLUTION INTRAVENOUS; SUBCUTANEOUS ONCE
Status: COMPLETED | OUTPATIENT
Start: 2024-03-10 | End: 2024-03-10

## 2024-03-10 RX ORDER — ACETAMINOPHEN 325 MG/1
650 TABLET ORAL EVERY 6 HOURS PRN
Status: DISCONTINUED | OUTPATIENT
Start: 2024-03-10 | End: 2024-03-13 | Stop reason: HOSPADM

## 2024-03-10 RX ORDER — MAGNESIUM HYDROXIDE/ALUMINUM HYDROXICE/SIMETHICONE 120; 1200; 1200 MG/30ML; MG/30ML; MG/30ML
30 SUSPENSION ORAL EVERY 6 HOURS PRN
Status: DISCONTINUED | OUTPATIENT
Start: 2024-03-10 | End: 2024-03-13 | Stop reason: HOSPADM

## 2024-03-10 RX ORDER — SODIUM CHLORIDE 0.9 % (FLUSH) 0.9 %
5-40 SYRINGE (ML) INJECTION EVERY 12 HOURS SCHEDULED
Status: DISCONTINUED | OUTPATIENT
Start: 2024-03-10 | End: 2024-03-13 | Stop reason: HOSPADM

## 2024-03-10 RX ORDER — MAGNESIUM SULFATE IN WATER 40 MG/ML
2000 INJECTION, SOLUTION INTRAVENOUS PRN
Status: DISCONTINUED | OUTPATIENT
Start: 2024-03-10 | End: 2024-03-13 | Stop reason: HOSPADM

## 2024-03-10 RX ORDER — INSULIN GLARGINE 100 [IU]/ML
0.25 INJECTION, SOLUTION SUBCUTANEOUS DAILY
Status: DISCONTINUED | OUTPATIENT
Start: 2024-03-10 | End: 2024-03-12

## 2024-03-10 RX ORDER — POLYETHYLENE GLYCOL 3350 17 G/17G
17 POWDER, FOR SOLUTION ORAL DAILY PRN
Status: DISCONTINUED | OUTPATIENT
Start: 2024-03-10 | End: 2024-03-13 | Stop reason: HOSPADM

## 2024-03-10 RX ORDER — INSULIN LISPRO 100 [IU]/ML
0-8 INJECTION, SOLUTION INTRAVENOUS; SUBCUTANEOUS
Status: DISCONTINUED | OUTPATIENT
Start: 2024-03-10 | End: 2024-03-13 | Stop reason: HOSPADM

## 2024-03-10 RX ORDER — POTASSIUM CHLORIDE 7.45 MG/ML
10 INJECTION INTRAVENOUS PRN
Status: DISCONTINUED | OUTPATIENT
Start: 2024-03-10 | End: 2024-03-13 | Stop reason: HOSPADM

## 2024-03-10 RX ORDER — POTASSIUM CHLORIDE 20 MEQ/1
40 TABLET, EXTENDED RELEASE ORAL PRN
Status: DISCONTINUED | OUTPATIENT
Start: 2024-03-10 | End: 2024-03-13 | Stop reason: HOSPADM

## 2024-03-10 RX ORDER — ONDANSETRON 2 MG/ML
4 INJECTION INTRAMUSCULAR; INTRAVENOUS EVERY 6 HOURS PRN
Status: DISCONTINUED | OUTPATIENT
Start: 2024-03-10 | End: 2024-03-13 | Stop reason: HOSPADM

## 2024-03-10 RX ORDER — DEXTROSE AND SODIUM CHLORIDE 5; .45 G/100ML; G/100ML
INJECTION, SOLUTION INTRAVENOUS CONTINUOUS PRN
Status: DISCONTINUED | OUTPATIENT
Start: 2024-03-10 | End: 2024-03-10

## 2024-03-10 RX ORDER — DEXTROSE AND SODIUM CHLORIDE 5; .45 G/100ML; G/100ML
INJECTION, SOLUTION INTRAVENOUS CONTINUOUS PRN
Status: DISCONTINUED | OUTPATIENT
Start: 2024-03-10 | End: 2024-03-13 | Stop reason: HOSPADM

## 2024-03-10 RX ORDER — ACETAMINOPHEN 650 MG/1
650 SUPPOSITORY RECTAL EVERY 6 HOURS PRN
Status: DISCONTINUED | OUTPATIENT
Start: 2024-03-10 | End: 2024-03-13 | Stop reason: HOSPADM

## 2024-03-10 RX ORDER — INSULIN LISPRO 100 [IU]/ML
0-4 INJECTION, SOLUTION INTRAVENOUS; SUBCUTANEOUS NIGHTLY
Status: DISCONTINUED | OUTPATIENT
Start: 2024-03-10 | End: 2024-03-13 | Stop reason: HOSPADM

## 2024-03-10 RX ORDER — AMLODIPINE BESYLATE 5 MG/1
10 TABLET ORAL NIGHTLY
Status: DISCONTINUED | OUTPATIENT
Start: 2024-03-10 | End: 2024-03-13 | Stop reason: HOSPADM

## 2024-03-10 RX ORDER — ONDANSETRON 4 MG/1
4 TABLET, ORALLY DISINTEGRATING ORAL EVERY 8 HOURS PRN
Status: DISCONTINUED | OUTPATIENT
Start: 2024-03-10 | End: 2024-03-13 | Stop reason: HOSPADM

## 2024-03-10 RX ORDER — SODIUM CHLORIDE 0.9 % (FLUSH) 0.9 %
5-40 SYRINGE (ML) INJECTION PRN
Status: DISCONTINUED | OUTPATIENT
Start: 2024-03-10 | End: 2024-03-13 | Stop reason: HOSPADM

## 2024-03-10 RX ADMIN — ENOXAPARIN SODIUM 30 MG: 100 INJECTION SUBCUTANEOUS at 09:35

## 2024-03-10 RX ADMIN — INSULIN LISPRO 8 UNITS: 100 INJECTION, SOLUTION INTRAVENOUS; SUBCUTANEOUS at 17:51

## 2024-03-10 RX ADMIN — SODIUM PHOSPHATE, MONOBASIC, MONOHYDRATE 15 MMOL: 276; 142 INJECTION, SOLUTION INTRAVENOUS at 23:26

## 2024-03-10 RX ADMIN — SODIUM CHLORIDE, PRESERVATIVE FREE 10 ML: 5 INJECTION INTRAVENOUS at 09:35

## 2024-03-10 RX ADMIN — INSULIN LISPRO 4 UNITS: 100 INJECTION, SOLUTION INTRAVENOUS; SUBCUTANEOUS at 18:15

## 2024-03-10 RX ADMIN — SODIUM CHLORIDE: 4.5 INJECTION, SOLUTION INTRAVENOUS at 03:39

## 2024-03-10 RX ADMIN — SODIUM CHLORIDE, PRESERVATIVE FREE 10 ML: 5 INJECTION INTRAVENOUS at 20:49

## 2024-03-10 RX ADMIN — AMLODIPINE BESYLATE 10 MG: 5 TABLET ORAL at 21:14

## 2024-03-10 RX ADMIN — INSULIN GLARGINE 32 UNITS: 100 INJECTION, SOLUTION SUBCUTANEOUS at 10:16

## 2024-03-10 RX ADMIN — INSULIN LISPRO 6 UNITS: 100 INJECTION, SOLUTION INTRAVENOUS; SUBCUTANEOUS at 21:16

## 2024-03-10 RX ADMIN — ENOXAPARIN SODIUM 30 MG: 100 INJECTION SUBCUTANEOUS at 21:15

## 2024-03-10 RX ADMIN — DEXTROSE AND SODIUM CHLORIDE: 5; 450 INJECTION, SOLUTION INTRAVENOUS at 07:08

## 2024-03-10 RX ADMIN — INSULIN LISPRO 2 UNITS: 100 INJECTION, SOLUTION INTRAVENOUS; SUBCUTANEOUS at 11:30

## 2024-03-10 ASSESSMENT — PAIN SCALES - GENERAL: PAINLEVEL_OUTOF10: 0

## 2024-03-10 ASSESSMENT — PAIN - FUNCTIONAL ASSESSMENT
PAIN_FUNCTIONAL_ASSESSMENT: NONE - DENIES PAIN
PAIN_FUNCTIONAL_ASSESSMENT: 0-10

## 2024-03-10 NOTE — ED PROVIDER NOTES
REFERRED TO:  No follow-up provider specified.      DISCHARGE MEDICATIONS:     Medication List        ASK your doctor about these medications      amLODIPine 10 MG tablet  Commonly known as: NORVASC  Take 1 tablet by mouth nightly     glimepiride 2 MG tablet  Commonly known as: AMARYL     losartan 100 MG tablet  Commonly known as: COZAAR  Take 1 tablet by mouth daily     metFORMIN 1000 MG tablet  Commonly known as: GLUCOPHAGE     ondansetron 4 MG disintegrating tablet  Commonly known as: ZOFRAN-ODT  Take 1 tablet by mouth 3 times daily as needed for Nausea or Vomiting     rosuvastatin 20 MG tablet  Commonly known as: CRESTOR                DISCONTINUED MEDICATIONS:  Current Discharge Medication List          I am the Primary Clinician of Record. Michoacano Moss MD (electronically signed)    (Please note that parts of this dictation were completed with voice recognition software. Quite often unanticipated grammatical, syntax, homophones, and other interpretive errors are inadvertently transcribed by the computer software. Please disregards these errors. Please excuse any errors that have escaped final proofreading.)        Michoacano Moss MD  03/10/24 0327

## 2024-03-10 NOTE — ED TRIAGE NOTES
Pt states that he went to a provider on Thursday he was told he had a virus. Pt states that he had a worsening in condition and that he is having abdominal pain and vomiting. Pt states that he is unable to keep any food/liquid down at this time. Pt rates pain 10/10 at this time. Pt denies diarrhea.

## 2024-03-10 NOTE — ED NOTES
Pt to the bathroom with a strong and steady gait, unassisted. VSS, NAD noted. Bed in lowest position, side rails up x2, call bell within reach.

## 2024-03-10 NOTE — ED NOTES
Pt report received from nightshift RN    Pt's insulin infusion titrated per the MAR, pt's fluids switched per orders. VSS, NAD noted. Pt given ice water per request. Bed in lowest position, side rails up x2, call bell within reach.

## 2024-03-10 NOTE — ED NOTES
Pt given breakfast tray. VSS, NAD noted. Bed in lowest position, side rails up x2, call bell within reach.    Left arm;

## 2024-03-10 NOTE — H&P
- Potassium 3.8. continue to monitor   - NPO for now. Consider advance diet after BS controlled.     # Essential hypertension   - Continue home medications.    - IV labetalol PRN     # Social Determents of health: None    # Full code as default. Need further clarification.     # Medication reconciliation though EMR and/or outside facility documentation. , However, medication reconciliation incomplete. Appreciate nursing/pharmacy's assistance             Signed By: Puma Bautista Cha, MD     March 10, 2024

## 2024-03-10 NOTE — ED NOTES
Pt given lunch tray. VSS, NAD noted, bed in lowest position, side rails up x2, call bell within reach.

## 2024-03-10 NOTE — ED PROVIDER NOTES
[facility] EMERGENCY DEPT  EMERGENCY DEPARTMENT ENCOUNTER      Pt Name: Humza Briscoe  MRN: [id number]  Birthdate 1985  Date of evaluation: 3/9/2024  Provider: Norman Barraza MD  9:15 PM    CHIEF COMPLAINT       Chief Complaint   Patient presents with    Emesis    Abdominal Pain         HISTORY OF PRESENT ILLNESS    Humza Briscoe is a 38 y.o. male who presents to the emergency department with mid abdominal pain with nausea and vomiting. Pain started 2 days ago and is 10/10. No fever or chills . No radiation . Pain is worse with pressure . Unable to keep food down.      HPI    Nursing Notes were reviewed.    REVIEW OF SYSTEMS       Review of Systems   Constitutional: Negative.  Negative for diaphoresis and fever.   HENT: Negative.     Eyes: Negative.    Respiratory: Negative.  Negative for chest tightness.    Cardiovascular: Negative.  Negative for chest pain.   Gastrointestinal:  Positive for abdominal pain, nausea and vomiting. Negative for diarrhea.   Endocrine: Negative.    Genitourinary: Negative.    Musculoskeletal: Negative.    Skin: Negative.    Allergic/Immunologic: Negative.    Neurological: Negative.  Negative for weakness.   Hematological: Negative.    Psychiatric/Behavioral: Negative.  Negative for behavioral problems.    All other systems reviewed and are negative.      Except as noted above the remainder of the review of systems was reviewed and negative.       PAST MEDICAL HISTORY     Past Medical History:   Diagnosis Date    Diabetes mellitus (HCC)     HTN (hypertension)     Kidney stone          SURGICAL HISTORY       Past Surgical History:   Procedure Laterality Date    LITHOTRIPSY  01/20/2021    Extracorporeal shockwave lithotripsy    ORTHOPEDIC SURGERY Right     Thumb reattachment    SHOULDER ARTHROSCOPY           CURRENT MEDICATIONS       Previous Medications    AMLODIPINE (NORVASC) 10 MG TABLET    Take 1 tablet by mouth nightly    GLIMEPIRIDE (AMARYL) 2 MG TABLET        LOSARTAN (COZAAR) 100 MG TABLET

## 2024-03-10 NOTE — ED NOTES
Spoke with Nandini Olsen NP regarding . Informed her I have given 8 units SC. Verbal order for 4 additional units SC and recheck.

## 2024-03-10 NOTE — ED NOTES
Hospitalist contacted for insulin transition orders. Awaiting updated orders.     Pt's VSS, NAD noted. Bed in lowest position, side rails upx2, call bell within reach.

## 2024-03-11 LAB
ANION GAP SERPL CALC-SCNC: 9 MMOL/L (ref 5–15)
ANION GAP SERPL CALC-SCNC: 9 MMOL/L (ref 5–15)
BASOPHILS # BLD: 0 K/UL (ref 0–0.1)
BASOPHILS NFR BLD: 1 % (ref 0–1)
BUN SERPL-MCNC: 13 MG/DL (ref 6–20)
BUN SERPL-MCNC: 14 MG/DL (ref 6–20)
BUN/CREAT SERPL: 12 (ref 12–20)
BUN/CREAT SERPL: 12 (ref 12–20)
CA-I BLD-MCNC: 9.6 MG/DL (ref 8.5–10.1)
CA-I BLD-MCNC: 9.8 MG/DL (ref 8.5–10.1)
CHLORIDE SERPL-SCNC: 107 MMOL/L (ref 97–108)
CHLORIDE SERPL-SCNC: 109 MMOL/L (ref 97–108)
CO2 SERPL-SCNC: 21 MMOL/L (ref 21–32)
CO2 SERPL-SCNC: 22 MMOL/L (ref 21–32)
CREAT SERPL-MCNC: 1.07 MG/DL (ref 0.7–1.3)
CREAT SERPL-MCNC: 1.17 MG/DL (ref 0.7–1.3)
DIFFERENTIAL METHOD BLD: ABNORMAL
EOSINOPHIL # BLD: 0.2 K/UL (ref 0–0.4)
EOSINOPHIL NFR BLD: 2 % (ref 0–7)
ERYTHROCYTE [DISTWIDTH] IN BLOOD BY AUTOMATED COUNT: 14 % (ref 11.5–14.5)
GLUCOSE BLD STRIP.AUTO-MCNC: 272 MG/DL (ref 65–100)
GLUCOSE BLD STRIP.AUTO-MCNC: 286 MG/DL (ref 65–100)
GLUCOSE BLD STRIP.AUTO-MCNC: 312 MG/DL (ref 65–100)
GLUCOSE BLD STRIP.AUTO-MCNC: 372 MG/DL (ref 65–100)
GLUCOSE BLD STRIP.AUTO-MCNC: 385 MG/DL (ref 65–100)
GLUCOSE SERPL-MCNC: 277 MG/DL (ref 65–100)
GLUCOSE SERPL-MCNC: 333 MG/DL (ref 65–100)
HCT VFR BLD AUTO: 40.2 % (ref 36.6–50.3)
HGB BLD-MCNC: 13.6 G/DL (ref 12.1–17)
IMM GRANULOCYTES # BLD AUTO: 0 K/UL (ref 0–0.04)
IMM GRANULOCYTES NFR BLD AUTO: 1 % (ref 0–0.5)
LYMPHOCYTES # BLD: 4.3 K/UL (ref 0.8–3.5)
LYMPHOCYTES NFR BLD: 47 % (ref 12–49)
MAGNESIUM SERPL-MCNC: 1.8 MG/DL (ref 1.6–2.4)
MAGNESIUM SERPL-MCNC: 1.9 MG/DL (ref 1.6–2.4)
MCH RBC QN AUTO: 29.4 PG (ref 26–34)
MCHC RBC AUTO-ENTMCNC: 33.8 G/DL (ref 30–36.5)
MCV RBC AUTO: 87 FL (ref 80–99)
MONOCYTES # BLD: 0.6 K/UL (ref 0–1)
MONOCYTES NFR BLD: 6 % (ref 5–13)
NEUTS SEG # BLD: 3.8 K/UL (ref 1.8–8)
NEUTS SEG NFR BLD: 43 % (ref 32–75)
NRBC # BLD: 0 K/UL (ref 0–0.01)
NRBC BLD-RTO: 0 PER 100 WBC
PERFORMED BY:: ABNORMAL
PHOSPHATE SERPL-MCNC: 3.5 MG/DL (ref 2.6–4.7)
PHOSPHATE SERPL-MCNC: 4.1 MG/DL (ref 2.6–4.7)
PLATELET # BLD AUTO: 375 K/UL (ref 150–400)
PMV BLD AUTO: 10.8 FL (ref 8.9–12.9)
POTASSIUM SERPL-SCNC: 3.9 MMOL/L (ref 3.5–5.1)
POTASSIUM SERPL-SCNC: 4.1 MMOL/L (ref 3.5–5.1)
RBC # BLD AUTO: 4.62 M/UL (ref 4.1–5.7)
SODIUM SERPL-SCNC: 137 MMOL/L (ref 136–145)
SODIUM SERPL-SCNC: 140 MMOL/L (ref 136–145)
WBC # BLD AUTO: 8.8 K/UL (ref 4.1–11.1)

## 2024-03-11 PROCEDURE — 2580000003 HC RX 258: Performed by: INTERNAL MEDICINE

## 2024-03-11 PROCEDURE — 6370000000 HC RX 637 (ALT 250 FOR IP): Performed by: INTERNAL MEDICINE

## 2024-03-11 PROCEDURE — 36415 COLL VENOUS BLD VENIPUNCTURE: CPT

## 2024-03-11 PROCEDURE — 6370000000 HC RX 637 (ALT 250 FOR IP): Performed by: STUDENT IN AN ORGANIZED HEALTH CARE EDUCATION/TRAINING PROGRAM

## 2024-03-11 PROCEDURE — 1100000000 HC RM PRIVATE

## 2024-03-11 PROCEDURE — 6360000002 HC RX W HCPCS: Performed by: INTERNAL MEDICINE

## 2024-03-11 PROCEDURE — 83735 ASSAY OF MAGNESIUM: CPT

## 2024-03-11 PROCEDURE — 80048 BASIC METABOLIC PNL TOTAL CA: CPT

## 2024-03-11 PROCEDURE — 84100 ASSAY OF PHOSPHORUS: CPT

## 2024-03-11 PROCEDURE — 85025 COMPLETE CBC W/AUTO DIFF WBC: CPT

## 2024-03-11 PROCEDURE — 82962 GLUCOSE BLOOD TEST: CPT

## 2024-03-11 RX ORDER — AMLODIPINE BESYLATE 5 MG/1
10 TABLET ORAL DAILY
Status: ON HOLD | COMMUNITY
Start: 2022-01-28 | End: 2024-03-11

## 2024-03-11 RX ADMIN — LABETALOL HYDROCHLORIDE 10 MG: 5 INJECTION INTRAVENOUS at 09:07

## 2024-03-11 RX ADMIN — INSULIN LISPRO 4 UNITS: 100 INJECTION, SOLUTION INTRAVENOUS; SUBCUTANEOUS at 21:34

## 2024-03-11 RX ADMIN — INSULIN GLARGINE 32 UNITS: 100 INJECTION, SOLUTION SUBCUTANEOUS at 08:58

## 2024-03-11 RX ADMIN — AMLODIPINE BESYLATE 10 MG: 5 TABLET ORAL at 21:33

## 2024-03-11 RX ADMIN — SODIUM CHLORIDE, PRESERVATIVE FREE 10 ML: 5 INJECTION INTRAVENOUS at 09:07

## 2024-03-11 RX ADMIN — SODIUM CHLORIDE, PRESERVATIVE FREE 10 ML: 5 INJECTION INTRAVENOUS at 21:34

## 2024-03-11 RX ADMIN — ENOXAPARIN SODIUM 30 MG: 100 INJECTION SUBCUTANEOUS at 08:58

## 2024-03-11 RX ADMIN — INSULIN LISPRO 6 UNITS: 100 INJECTION, SOLUTION INTRAVENOUS; SUBCUTANEOUS at 11:38

## 2024-03-11 RX ADMIN — ONDANSETRON 4 MG: 2 INJECTION INTRAMUSCULAR; INTRAVENOUS at 09:55

## 2024-03-11 RX ADMIN — INSULIN LISPRO 4 UNITS: 100 INJECTION, SOLUTION INTRAVENOUS; SUBCUTANEOUS at 17:04

## 2024-03-11 RX ADMIN — ACETAMINOPHEN 650 MG: 325 TABLET ORAL at 08:57

## 2024-03-11 RX ADMIN — ENOXAPARIN SODIUM 30 MG: 100 INJECTION SUBCUTANEOUS at 21:33

## 2024-03-11 RX ADMIN — INSULIN LISPRO 4 UNITS: 100 INJECTION, SOLUTION INTRAVENOUS; SUBCUTANEOUS at 08:58

## 2024-03-11 ASSESSMENT — ENCOUNTER SYMPTOMS
GASTROINTESTINAL NEGATIVE: 1
RESPIRATORY NEGATIVE: 1

## 2024-03-11 ASSESSMENT — PAIN SCALES - GENERAL
PAINLEVEL_OUTOF10: 0
PAINLEVEL_OUTOF10: 4
PAINLEVEL_OUTOF10: 4
PAINLEVEL_OUTOF10: 6
PAINLEVEL_OUTOF10: 6
PAINLEVEL_OUTOF10: 0
PAINLEVEL_OUTOF10: 0
PAINLEVEL_OUTOF10: 4
PAINLEVEL_OUTOF10: 5
PAINLEVEL_OUTOF10: 6

## 2024-03-11 ASSESSMENT — PAIN - FUNCTIONAL ASSESSMENT: PAIN_FUNCTIONAL_ASSESSMENT: NONE - DENIES PAIN

## 2024-03-11 ASSESSMENT — PAIN DESCRIPTION - LOCATION
LOCATION: HEAD
LOCATION: HEAD

## 2024-03-11 NOTE — ED NOTES
Dr sunshine made aware of pt glucose 371. Verbal order received to give 6 units of humulog.insulin

## 2024-03-11 NOTE — ED NOTES
ED TO INPATIENT SBAR HANDOFF    Patient Name: Humza Briscoe   Preferred Name: Humza  : 1985  38 y.o.   Family/Caregiver Present: no   Code Status Order: Full Code  PO Status: reg, 4carb choices  Telemetry Order: yes, per ICU  C-SSRS: Risk of Suicide: No Risk  Sitter no   Restraints:   no  Sepsis Risk Score Sepsis Risk Score: 0.56    Situation  Chief Complaint   Patient presents with    Transfer of Care     Brief Description of Patient's Condition: stable  Mental Status: oriented, alert, coherent, logical, thought processes intact, and able to concentrate and follow conversation  Arrived from:Home  Imaging:   No orders to display     Abnormal labs:   Abnormal Labs Reviewed   HEMOGLOBIN A1C - Abnormal; Notable for the following components:       Result Value    Hemoglobin A1C 12.9 (*)     All other components within normal limits   BASIC METABOLIC PANEL - Abnormal; Notable for the following components:    Chloride 113 (*)     Glucose 285 (*)     Calcium 10.2 (*)     All other components within normal limits   BASIC METABOLIC PANEL - Abnormal; Notable for the following components:    Chloride 114 (*)     Glucose 218 (*)     Bun/Cre Ratio 11 (*)     Calcium 10.3 (*)     All other components within normal limits   BASIC METABOLIC PANEL - Abnormal; Notable for the following components:    Chloride 116 (*)     CO2 20 (*)     Glucose 269 (*)     All other components within normal limits   BASIC METABOLIC PANEL - Abnormal; Notable for the following components:    Glucose 340 (*)     Creatinine 1.39 (*)     Bun/Cre Ratio 10 (*)     All other components within normal limits   VENOUS BLOOD GAS, POINT OF CARE - Abnormal; Notable for the following components:    HCO3, Venous 20.7 (*)     All other components within normal limits   POCT GLUCOSE - Abnormal; Notable for the following components:    POC Glucose 261 (*)     All other components within normal limits   POCT GLUCOSE - Abnormal; Notable for the following components:

## 2024-03-11 NOTE — CARE COORDINATION
03/11/24 1155   Service Assessment   Patient Orientation Alert and Oriented   Cognition Alert   Primary Caregiver Self   Support Systems Spouse/Significant Other;Children;Friends/Neighbors   Patient's Healthcare Decision Maker is: Legal Next of Kin   PCP Verified by CM Yes   Last Visit to PCP Within last 3 months   Prior Functional Level Independent in ADLs/IADLs   Current Functional Level Independent in ADLs/IADLs   Can patient return to prior living arrangement Yes   Ability to make needs known: Good   Family able to assist with home care needs: Yes   Would you like for me to discuss the discharge plan with any other family members/significant others, and if so, who? Yes   Financial Resources None   Community Resources None   Social/Functional History   Lives With Spouse   ADL Assistance Independent   Homemaking Assistance Independent   Ambulation Assistance Independent   Transfer Assistance Independent   Active  Yes   Occupation Full time employment   Services At/After Discharge   Mode of Transport at Discharge Other (see comment)   Confirm Follow Up Transport Family     CM met f/f with Pt, he confirmed that the information on the face sheet is correct. Pt stated that he lives with his wife.    No HH, no DME and independent with ADL, Pt works full time.    Send [facility]    Family will transport Pt home when D/C    CM dispo: home, Pt needs diabetic teaching and supplies.     Advance Care Planning     General Advance Care Planning (ACP) Conversation    Date of Conversation: 3/11/2024  Conducted with:     Healthcare Decision Maker:    Primary Decision Maker: BriscoeSherin - Spouse - [phone]  Click here to complete Healthcare Decision Makers including selection of the Healthcare Decision Maker Relationship (ie \"Primary\").   Today we     Content/Action Overview:    Reviewed DNR/DNI and patient elects Full Code (Attempt Resuscitation)

## 2024-03-11 NOTE — ED NOTES
Pt moved from room 5 to room 18. Assumed caare of pt at this time. Bp cardiac and pulse ox monitors applied.

## 2024-03-11 NOTE — CONSULTS
Nutrition Education    Educated on diabetic diet, carbohydrate counting.  Learners: Patient  Readiness: Eager  Method: Explanation and Handout  Response: Verbalizes Understanding  Contact name and number provided.    Stephie Mcduffie RD  Contact Number: [phone]

## 2024-03-12 LAB
BASOPHILS # BLD: 0.1 K/UL (ref 0–0.1)
BASOPHILS NFR BLD: 1 % (ref 0–1)
DIFFERENTIAL METHOD BLD: NORMAL
EKG ATRIAL RATE: 104 BPM
EKG DIAGNOSIS: NORMAL
EKG P AXIS: 52 DEGREES
EKG P-R INTERVAL: 158 MS
EKG Q-T INTERVAL: 325 MS
EKG QRS DURATION: 93 MS
EKG QTC CALCULATION (BAZETT): 428 MS
EKG R AXIS: -52 DEGREES
EKG T AXIS: -3 DEGREES
EKG VENTRICULAR RATE: 104 BPM
EOSINOPHIL # BLD: 0.2 K/UL (ref 0–0.4)
EOSINOPHIL NFR BLD: 3 % (ref 0–7)
ERYTHROCYTE [DISTWIDTH] IN BLOOD BY AUTOMATED COUNT: 13.6 % (ref 11.5–14.5)
GLUCOSE BLD STRIP.AUTO-MCNC: 248 MG/DL (ref 65–100)
GLUCOSE BLD STRIP.AUTO-MCNC: 258 MG/DL (ref 65–100)
GLUCOSE BLD STRIP.AUTO-MCNC: 291 MG/DL (ref 65–100)
GLUCOSE BLD STRIP.AUTO-MCNC: 317 MG/DL (ref 65–100)
HCT VFR BLD AUTO: 41.1 % (ref 36.6–50.3)
HGB BLD-MCNC: 13.4 G/DL (ref 12.1–17)
IMM GRANULOCYTES # BLD AUTO: 0 K/UL (ref 0–0.04)
IMM GRANULOCYTES NFR BLD AUTO: 0 % (ref 0–0.5)
LYMPHOCYTES # BLD: 3.1 K/UL (ref 0.8–3.5)
LYMPHOCYTES NFR BLD: 46 % (ref 12–49)
MCH RBC QN AUTO: 28.9 PG (ref 26–34)
MCHC RBC AUTO-ENTMCNC: 32.6 G/DL (ref 30–36.5)
MCV RBC AUTO: 88.6 FL (ref 80–99)
MONOCYTES # BLD: 0.5 K/UL (ref 0–1)
MONOCYTES NFR BLD: 7 % (ref 5–13)
NEUTS SEG # BLD: 3 K/UL (ref 1.8–8)
NEUTS SEG NFR BLD: 43 % (ref 32–75)
NRBC # BLD: 0 K/UL (ref 0–0.01)
NRBC BLD-RTO: 0 PER 100 WBC
PERFORMED BY:: ABNORMAL
PLATELET # BLD AUTO: 351 K/UL (ref 150–400)
PMV BLD AUTO: 10.6 FL (ref 8.9–12.9)
RBC # BLD AUTO: 4.64 M/UL (ref 4.1–5.7)
WBC # BLD AUTO: 6.8 K/UL (ref 4.1–11.1)

## 2024-03-12 PROCEDURE — 6370000000 HC RX 637 (ALT 250 FOR IP): Performed by: STUDENT IN AN ORGANIZED HEALTH CARE EDUCATION/TRAINING PROGRAM

## 2024-03-12 PROCEDURE — 36415 COLL VENOUS BLD VENIPUNCTURE: CPT

## 2024-03-12 PROCEDURE — 1100000000 HC RM PRIVATE

## 2024-03-12 PROCEDURE — 85025 COMPLETE CBC W/AUTO DIFF WBC: CPT

## 2024-03-12 PROCEDURE — 6360000002 HC RX W HCPCS: Performed by: INTERNAL MEDICINE

## 2024-03-12 PROCEDURE — 6370000000 HC RX 637 (ALT 250 FOR IP): Performed by: HOSPITALIST

## 2024-03-12 PROCEDURE — 82962 GLUCOSE BLOOD TEST: CPT

## 2024-03-12 PROCEDURE — 2580000003 HC RX 258: Performed by: INTERNAL MEDICINE

## 2024-03-12 PROCEDURE — 6370000000 HC RX 637 (ALT 250 FOR IP): Performed by: INTERNAL MEDICINE

## 2024-03-12 RX ORDER — LANCETS 30 GAUGE
1 EACH MISCELLANEOUS DAILY
Qty: 100 EACH | Refills: 5 | Status: SHIPPED | OUTPATIENT
Start: 2024-03-12

## 2024-03-12 RX ORDER — INSULIN GLARGINE 100 [IU]/ML
40 INJECTION, SOLUTION SUBCUTANEOUS DAILY
Status: DISCONTINUED | OUTPATIENT
Start: 2024-03-13 | End: 2024-03-13 | Stop reason: HOSPADM

## 2024-03-12 RX ORDER — INSULIN LISPRO 100 [IU]/ML
10 INJECTION, SOLUTION INTRAVENOUS; SUBCUTANEOUS ONCE
Status: COMPLETED | OUTPATIENT
Start: 2024-03-12 | End: 2024-03-12

## 2024-03-12 RX ORDER — PEN NEEDLE, DIABETIC 30 GX5/16"
1 NEEDLE, DISPOSABLE MISCELLANEOUS DAILY
Qty: 100 EACH | Refills: 3 | Status: SHIPPED | OUTPATIENT
Start: 2024-03-12

## 2024-03-12 RX ORDER — BLOOD-GLUCOSE METER
1 KIT MISCELLANEOUS DAILY
Qty: 1 KIT | Refills: 0 | Status: SHIPPED | OUTPATIENT
Start: 2024-03-12

## 2024-03-12 RX ORDER — INSULIN GLARGINE 100 [IU]/ML
40 INJECTION, SOLUTION SUBCUTANEOUS NIGHTLY
Qty: 30 ADJUSTABLE DOSE PRE-FILLED PEN SYRINGE | Refills: 3 | Status: SHIPPED | OUTPATIENT
Start: 2024-03-12

## 2024-03-12 RX ORDER — INSULIN LISPRO 100 [IU]/ML
INJECTION, SOLUTION INTRAVENOUS; SUBCUTANEOUS
Qty: 90 ADJUSTABLE DOSE PRE-FILLED PEN SYRINGE | Refills: 3 | Status: SHIPPED | OUTPATIENT
Start: 2024-03-12

## 2024-03-12 RX ORDER — GLUCOSAMINE HCL/CHONDROITIN SU 500-400 MG
CAPSULE ORAL
Qty: 90 STRIP | Refills: 2 | Status: SHIPPED | OUTPATIENT
Start: 2024-03-12

## 2024-03-12 RX ADMIN — SODIUM CHLORIDE, PRESERVATIVE FREE 10 ML: 5 INJECTION INTRAVENOUS at 09:03

## 2024-03-12 RX ADMIN — INSULIN LISPRO 4 UNITS: 100 INJECTION, SOLUTION INTRAVENOUS; SUBCUTANEOUS at 20:43

## 2024-03-12 RX ADMIN — ENOXAPARIN SODIUM 30 MG: 100 INJECTION SUBCUTANEOUS at 20:44

## 2024-03-12 RX ADMIN — INSULIN LISPRO 2 UNITS: 100 INJECTION, SOLUTION INTRAVENOUS; SUBCUTANEOUS at 08:56

## 2024-03-12 RX ADMIN — SODIUM CHLORIDE, PRESERVATIVE FREE 10 ML: 5 INJECTION INTRAVENOUS at 20:43

## 2024-03-12 RX ADMIN — ENOXAPARIN SODIUM 30 MG: 100 INJECTION SUBCUTANEOUS at 08:56

## 2024-03-12 RX ADMIN — INSULIN GLARGINE 32 UNITS: 100 INJECTION, SOLUTION SUBCUTANEOUS at 08:56

## 2024-03-12 RX ADMIN — INSULIN LISPRO 4 UNITS: 100 INJECTION, SOLUTION INTRAVENOUS; SUBCUTANEOUS at 11:51

## 2024-03-12 RX ADMIN — AMLODIPINE BESYLATE 10 MG: 5 TABLET ORAL at 20:43

## 2024-03-12 RX ADMIN — INSULIN LISPRO 4 UNITS: 100 INJECTION, SOLUTION INTRAVENOUS; SUBCUTANEOUS at 17:15

## 2024-03-12 RX ADMIN — ACETAMINOPHEN 650 MG: 325 TABLET ORAL at 20:43

## 2024-03-12 RX ADMIN — INSULIN LISPRO 10 UNITS: 100 INJECTION, SOLUTION INTRAVENOUS; SUBCUTANEOUS at 00:11

## 2024-03-12 ASSESSMENT — PAIN SCALES - GENERAL
PAINLEVEL_OUTOF10: 1
PAINLEVEL_OUTOF10: 3

## 2024-03-12 ASSESSMENT — PAIN DESCRIPTION - ORIENTATION: ORIENTATION: ANTERIOR

## 2024-03-12 ASSESSMENT — ENCOUNTER SYMPTOMS
GASTROINTESTINAL NEGATIVE: 1
RESPIRATORY NEGATIVE: 1

## 2024-03-12 ASSESSMENT — PAIN DESCRIPTION - DESCRIPTORS: DESCRIPTORS: ACHING;DULL

## 2024-03-12 ASSESSMENT — PAIN - FUNCTIONAL ASSESSMENT: PAIN_FUNCTIONAL_ASSESSMENT: ACTIVITIES ARE NOT PREVENTED

## 2024-03-12 ASSESSMENT — PAIN DESCRIPTION - LOCATION: LOCATION: HEAD

## 2024-03-12 NOTE — CARE COORDINATION
DC Plan: Home    Cm will continue to follow.     __________________________________________________    Cm met with pt at the bedside. Cm offered the meds to beds program through [facility]. Pt is interested in those services. Cm notified attending.     Nurse is aware of meds to beds program.

## 2024-03-12 NOTE — PLAN OF CARE
Problem: Discharge Planning  Goal: Discharge to home or other facility with appropriate resources  3/12/2024 1203 by Dyana Snyder RN  Outcome: Progressing  3/12/2024 0024 by Davida Chowdary RN  Outcome: Progressing     Problem: Safety - Adult  Goal: Free from fall injury  3/12/2024 1203 by Dyana Snyder RN  Outcome: Progressing  3/12/2024 0024 by Davida Chowdary RN  Outcome: Progressing     Problem: ABCDS Injury Assessment  Goal: Absence of physical injury  3/12/2024 1203 by Dyana Snyder RN  Outcome: Progressing  3/12/2024 0024 by Davida Chowdary RN  Outcome: Progressing     Problem: Pain  Goal: Verbalizes/displays adequate comfort level or baseline comfort level  3/12/2024 1203 by Dyana Snyder RN  Outcome: Progressing  3/12/2024 0024 by Davida Chowdary RN  Outcome: Progressing     Problem: Chronic Conditions and Co-morbidities  Goal: Patient's chronic conditions and co-morbidity symptoms are monitored and maintained or improved  3/12/2024 1203 by Dyana Snyder RN  Outcome: Progressing  3/12/2024 0024 by Davida Chowdary RN  Outcome: Progressing

## 2024-03-12 NOTE — DISCHARGE SUMMARY
Discharge Summary    Name: Humza Briscoe  [id number]  YOB: 1985 (Age: 38 y.o.)   Date of Admission: 3/10/2024  Date of Discharge: 3/12/2024  Attending Physician: Adán Cavanaugh MD    Discharge Diagnosis:   Principal Problem:    Secondary DM with DKA (HCC)  Resolved Problems:    * No resolved hospital problems. *  DKA  Uncontrolled diabetes mellitus type 2  Essential hypertension      Consultations:  IP CONSULT TO DIABETES MANAGEMENT  IP CONSULT TO DIETITIAN  IP CONSULT TO CASE MANAGEMENT      Brief Admission History/Reason for Admission Per Puma Bautista Cha, MD:       Brief Hospital Course by Main Problems:   Humza Briscoe is a 38 y.o. male with diabetes and hypertension who presented to an outside facility with a chief complaint of generalized weakness. Symptoms have been ongoing for the past 3-4 days. Associated with epigastric pain, nausea, and vomiting. Patient also noted urinary frequency w/o dysuria. Patient 's blood glucose was 756.  Patient was  started on an insulin gtt and IVF for DKA. Patient reported he was recently diagnosed with diabetes 2 weeks ago, prior to starting  metformin. Patient states last week his metformin dose was increased to 1000 mg BID. Upon arrival to [facility] patient was tachycardic and hypertensive. Blood glucose was still elevated > 300, gap closed. Insulin gtt discontinued, started on SSI.  Patient be discharged on 40 units of Lantus and medium scale sliding scale insulin lispro pens.  Glucometer and lancets and alcohol swabs also prescribed.  Patient follow-up primary care physician for ongoing care.  Hemoglobin A1c was 13    Discharge Exam:  Patient seen and examined by me on discharge day.  Pertinent Findings:  Patient Vitals for the past 24 hrs:   BP Temp Temp src Pulse Resp SpO2   03/12/24 1530 (!) 133/102 98.1 °F (36.7 °C) Oral 85 18 95 %   03/12/24 0753 (!) 141/100 98.2 °F (36.8 °C) Oral 76 18 99 %   03/11/24 2332 138/82 98.2 °F

## 2024-03-13 VITALS
TEMPERATURE: 98.3 F | RESPIRATION RATE: 23 BRPM | WEIGHT: 292.11 LBS | SYSTOLIC BLOOD PRESSURE: 138 MMHG | BODY MASS INDEX: 40.9 KG/M2 | HEIGHT: 71 IN | DIASTOLIC BLOOD PRESSURE: 88 MMHG | OXYGEN SATURATION: 99 % | HEART RATE: 80 BPM

## 2024-03-13 LAB
BASOPHILS # BLD: 0 K/UL (ref 0–0.1)
BASOPHILS NFR BLD: 1 % (ref 0–1)
DIFFERENTIAL METHOD BLD: NORMAL
EOSINOPHIL # BLD: 0.1 K/UL (ref 0–0.4)
EOSINOPHIL NFR BLD: 2 % (ref 0–7)
ERYTHROCYTE [DISTWIDTH] IN BLOOD BY AUTOMATED COUNT: 13.5 % (ref 11.5–14.5)
GLUCOSE BLD STRIP.AUTO-MCNC: 257 MG/DL (ref 65–100)
GLUCOSE BLD STRIP.AUTO-MCNC: 269 MG/DL (ref 65–100)
HCT VFR BLD AUTO: 39.9 % (ref 36.6–50.3)
HGB BLD-MCNC: 13.6 G/DL (ref 12.1–17)
IMM GRANULOCYTES # BLD AUTO: 0 K/UL (ref 0–0.04)
IMM GRANULOCYTES NFR BLD AUTO: 0 % (ref 0–0.5)
LYMPHOCYTES # BLD: 2.2 K/UL (ref 0.8–3.5)
LYMPHOCYTES NFR BLD: 45 % (ref 12–49)
MCH RBC QN AUTO: 29.2 PG (ref 26–34)
MCHC RBC AUTO-ENTMCNC: 34.1 G/DL (ref 30–36.5)
MCV RBC AUTO: 85.8 FL (ref 80–99)
MONOCYTES # BLD: 0.4 K/UL (ref 0–1)
MONOCYTES NFR BLD: 8 % (ref 5–13)
NEUTS SEG # BLD: 2.1 K/UL (ref 1.8–8)
NEUTS SEG NFR BLD: 44 % (ref 32–75)
NRBC # BLD: 0 K/UL (ref 0–0.01)
NRBC BLD-RTO: 0 PER 100 WBC
PERFORMED BY:: ABNORMAL
PERFORMED BY:: ABNORMAL
PLATELET # BLD AUTO: 368 K/UL (ref 150–400)
PMV BLD AUTO: 10.9 FL (ref 8.9–12.9)
RBC # BLD AUTO: 4.65 M/UL (ref 4.1–5.7)
WBC # BLD AUTO: 4.9 K/UL (ref 4.1–11.1)

## 2024-03-13 PROCEDURE — 82962 GLUCOSE BLOOD TEST: CPT

## 2024-03-13 PROCEDURE — 6360000002 HC RX W HCPCS: Performed by: INTERNAL MEDICINE

## 2024-03-13 PROCEDURE — 36415 COLL VENOUS BLD VENIPUNCTURE: CPT

## 2024-03-13 PROCEDURE — 6370000000 HC RX 637 (ALT 250 FOR IP): Performed by: STUDENT IN AN ORGANIZED HEALTH CARE EDUCATION/TRAINING PROGRAM

## 2024-03-13 PROCEDURE — 2580000003 HC RX 258: Performed by: INTERNAL MEDICINE

## 2024-03-13 PROCEDURE — 85025 COMPLETE CBC W/AUTO DIFF WBC: CPT

## 2024-03-13 PROCEDURE — 6370000000 HC RX 637 (ALT 250 FOR IP): Performed by: PHYSICIAN ASSISTANT

## 2024-03-13 RX ADMIN — ENOXAPARIN SODIUM 30 MG: 100 INJECTION SUBCUTANEOUS at 08:26

## 2024-03-13 RX ADMIN — INSULIN GLARGINE 40 UNITS: 100 INJECTION, SOLUTION SUBCUTANEOUS at 08:24

## 2024-03-13 RX ADMIN — SODIUM CHLORIDE, PRESERVATIVE FREE 10 ML: 5 INJECTION INTRAVENOUS at 08:25

## 2024-03-13 RX ADMIN — INSULIN LISPRO 4 UNITS: 100 INJECTION, SOLUTION INTRAVENOUS; SUBCUTANEOUS at 08:25

## 2024-03-13 NOTE — CARE COORDINATION
Transition of Care Plan:    RUR: 8%  Prior Level of Functioning: independent  Disposition: home  If SNF or IPR: Date FOC offered: N/A  Date FOC received: N/A  Accepting facility: N/A  Date authorization started with reference number: N/A  Date authorization received and expires: N/A  Follow up appointments: N/A  DME needed: None  Transportation at discharge: family  IM/IMM Medicare/ letter given: N/A  Is patient a [facility] and connected with [facility]?    If yes, was [facility] transfer form completed and [facility] notified? N/A  Caregiver Contact: N/A  Discharge Caregiver contacted prior to discharge? N/A  Care Conference needed? No  Barriers to discharge: None

## 2024-03-13 NOTE — PLAN OF CARE
Problem: Discharge Planning  Goal: Discharge to home or other facility with appropriate resources  Outcome: Completed  Flowsheets (Taken 3/13/2024 5022)  Discharge to home or other facility with appropriate resources: Identify barriers to discharge with patient and caregiver

## 2024-03-13 NOTE — PROGRESS NOTES
4 Eyes Skin Assessment     NAME:  Humza Briscoe  YOB: 1985  MEDICAL RECORD NUMBER:  [id number]    The patient is being assessed for  Transfer to New Unit    I agree that at least one RN has performed a thorough Head to Toe Skin Assessment on the patient. ALL assessment sites listed below have been assessed.      Areas assessed by both nurses:    Head, Face, Ears, Shoulders, Back, Chest, Arms, Elbows, Hands, Sacrum. Buttock, Coccyx, Ischium, Legs. Feet and Heels, and Under Medical Devices         Does the Patient have a Wound? No noted wound(s)       [facility] Prevention initiated by RN: No  Wound Care Orders initiated by RN: No    Pressure Injury (Stage 3,4, Unstageable, DTI, NWPT, and Complex wounds) if present, place Wound referral order by RN under : No    New Ostomies, if present place, Ostomy referral order under : No     Nurse 1 eSignature: Electronically signed by Dyana Snyder RN on 3/11/24 at 1:59 PM EDT    **SHARE this note so that the co-signing nurse can place an eSignature**    Nurse 2 eSignature: Electronically signed by Fernandez Alexander RN on 3/11/24 at 2:56 PM EDT    
4 Eyes Skin Assessment     NAME:  Humza Briscoe  YOB: 1985  MEDICAL RECORD NUMBER:  [id number]    The patient is being assessed for  Other Per Unit  Protocol    I agree that at least one RN has performed a thorough Head to Toe Skin Assessment on the patient. ALL assessment sites listed below have been assessed.      Areas assessed by both nurses:    Head, Face, Ears, Shoulders, Back, Chest, Arms, Elbows, Hands, Sacrum. Buttock, Coccyx, Ischium, Legs. Feet and Heels, and Under Medical Devices         Does the Patient have a Wound? No noted wound(s)       [facility] Prevention initiated by RN: No  Wound Care Orders initiated by RN: No    Pressure Injury (Stage 3,4, Unstageable, DTI, NWPT, and Complex wounds) if present, place Wound referral order by RN under : No    New Ostomies, if present place, Ostomy referral order under : No     Nurse 1 eSignature: Electronically signed by Liz Huff RN on 3/13/24 at 11:18 AM EDT    **SHARE this note so that the co-signing nurse can place an eSignature**    Nurse 2 eSignature: Electronically signed by Ila Dwyer RN on 3/13/24 at 11:40 AM EDT   
Discharge plan of care/case management plan validated with provider discharge order.    Discharge instructions discussed with the patient. All concerns addressed at this time. Medication received from [place] via Meds to beds.    PIV removed,. Patient wheeled downstairs to go home.  
Patient educated on insulin self administration; patient was able to teachback and demonstrate how to draw up insulin and how to self administer; voices understanding but will need reinforcement before discharge  
Report called to Dyana MARTINES on [address], awaiting room to be cleaned.   
nephrotoxicity:     [] IV Narcotic analgesia for adverse drug reaction  [] Aggressive IV diuresis requiring serial monitoring for renal impairment and electrolyte derangements  [x] Critical electrolyte abnormalities requiring IV replacement and close serial monitoring  [x] Insulin - monitoring serial FSBS for Hypoglycemic adverse drug reaction  [] Other -   [] Change in code status:    [] Decision to escalate care:    [] Major surgery/procedure with associated risk factors:    ----------------------------------------------------------------------  C. Data (any 2)  [x] Discussed current management and discharge planning options with Case Management.  [x] Discussed management of the case with:  Patient, nursing  [] Telemetry personally reviewed and interpreted as documented above    [] Imaging personally reviewed and interpreted, includes:    [] Data Review (any 3)  [x] All available Consultant notes from yesterday/today were reviewed  [x] All current labs were reviewed and interpreted for clinical significance   [x] Appropriate follow-up labs were ordered  [] Collateral history obtained from:         Code Status: full   DVT Prophylaxis: Lovenox  GI Prophylaxis:none    Subjective:     Chief Complaint / Reason for Physician Visit  Patient was seen and examined at the bedside in the ED. Sleeping at the time of examination, will attempt to examined at a later time. Discussed with RN events overnight.       Objective:     VITALS:   Last 24hrs VS reviewed since prior progress note. Most recent are:  Patient Vitals for the past 24 hrs:   BP Temp Temp src Pulse Resp SpO2 Height Weight   03/10/24 1300 (!) 155/97 -- -- 94 19 98 % -- --   03/10/24 1230 (!) 153/103 -- -- 89 19 95 % -- --   03/10/24 1200 139/80 -- -- 91 21 96 % -- --   03/10/24 1130 (!) 149/89 -- -- 96 12 99 % -- --   03/10/24 1109 (!) 145/109 -- -- 94 12 97 % -- --   03/10/24 1100 (!) 145/109 -- -- 91 19 97 % -- --   03/10/24 1030 (!) 156/100 -- -- 94 15 99 % -- 
Normocephalic and atraumatic.   Cardiovascular:      Rate and Rhythm: Normal rate and regular rhythm.      Heart sounds: Normal heart sounds.   Pulmonary:      Effort: Pulmonary effort is normal.      Breath sounds: Normal breath sounds.   Abdominal:      General: Abdomen is flat. Bowel sounds are normal.      Palpations: Abdomen is soft.   Musculoskeletal:         General: Normal range of motion.   Skin:     General: Skin is warm and dry.   Neurological:      Mental Status: He is alert and oriented to person, place, and time.            Intake/Output Summary (Last 24 hours) at 3/12/2024 1231  Last data filed at 3/11/2024 2133  Gross per 24 hour   Intake 10 ml   Output --   Net 10 ml          I had a face to face encounter and independently examined this patient on 3/12/2024, as outlined below:      ________________________________________________________________________    Total NON critical care TIME:  35  Minutes    Total CRITICAL CARE TIME Spent:   Minutes non procedure based      Comments   >50% of visit spent in counseling and coordination of care     ________________________________________________________________________  Scott Anderson     Procedures: see electronic medical records for all procedures/Xrays and details which were not copied into this note but were reviewed prior to creation of Plan.      LABS:  I reviewed today's most current labs and imaging studies.  Pertinent labs include:  Recent Labs     03/09/24 2118 03/11/24  0440 03/12/24  0720   WBC 7.8 8.8 6.8   HGB 16.4 13.6 13.4   HCT 48.6 40.2 41.1   * 375 351       Recent Labs     03/09/24 2118 03/09/24  2343 03/10/24  2202 03/11/24  0440 03/11/24  1737   *   < > 137 140 137   K 4.4   < > 3.9 3.9 4.1   CL 97   < > 107 109* 107   CO2 18*   < > 23 22 21   BUN 16   < > 14 13 14   MG  --    < > 2.0 1.9 1.8   PHOS  --    < > 2.6 4.1 3.5   ALT 42  --   --   --   --     < > = values in this interval not displayed.         Signed: Scott Anderson   
450 ml   Net -200 ml          I had a face to face encounter and independently examined this patient on 3/11/2024, as outlined below:      ________________________________________________________________________    Total NON critical care TIME:  35  Minutes    Total CRITICAL CARE TIME Spent:   Minutes non procedure based      Comments   >50% of visit spent in counseling and coordination of care     ________________________________________________________________________  Sukhwinder Sheikh PA-C     Procedures: see electronic medical records for all procedures/Xrays and details which were not copied into this note but were reviewed prior to creation of Plan.      LABS:  I reviewed today's most current labs and imaging studies.  Pertinent labs include:  Recent Labs     03/09/24 2118 03/11/24  0440   WBC 7.8 8.8   HGB 16.4 13.6   HCT 48.6 40.2   * 375       Recent Labs     03/09/24 2118 03/09/24  2343 03/10/24  1522 03/10/24  2202 03/11/24  0440   *   < > 144 137 140   K 4.4   < > 3.8 3.9 3.9   CL 97   < > 116* 107 109*   CO2 18*   < > 20* 23 22   BUN 16   < > 13 14 13   MG  --    < > 1.8 2.0 1.9   PHOS  --    < > 2.7 2.6 4.1   ALT 42  --   --   --   --     < > = values in this interval not displayed.         Signed: Sukhwinder Sheikh PA-C

## 2024-03-13 NOTE — DISCHARGE SUMMARY
Discharge Summary    Name: Humza Briscoe  [id number]  YOB: 1985 (Age: 38 y.o.)   Date of Admission: 3/10/2024  Date of Discharge: 3/13/2024  Attending Physician: Adán Cavanaugh MD    Discharge Diagnosis:   Principal Problem:    Secondary DM with DKA (HCC)  Resolved Problems:    * No resolved hospital problems. *  DKA  Uncontrolled diabetes mellitus type 2  Essential hypertension      Consultations:  IP CONSULT TO DIABETES MANAGEMENT  IP CONSULT TO DIETITIAN  IP CONSULT TO CASE MANAGEMENT      Brief Admission History/Reason for Admission Per Puma Bautista Cha, MD:       Brief Hospital Course by Main Problems:   Humza Briscoe is a 38 y.o. male with diabetes and hypertension who presented to an outside facility with a chief complaint of generalized weakness. Symptoms have been ongoing for the past 3-4 days. Associated with epigastric pain, nausea, and vomiting. Patient also noted urinary frequency w/o dysuria. Patient 's blood glucose was 756.  Patient was  started on an insulin gtt and IVF for DKA. Patient reported he was recently diagnosed with diabetes 2 weeks ago, prior to starting  metformin. Patient states last week his metformin dose was increased to 1000 mg BID. Upon arrival to [facility] patient was tachycardic and hypertensive. Blood glucose was still elevated > 300, gap closed. Insulin gtt discontinued, started on SSI.  Patient be discharged on 40 units of Lantus and medium scale sliding scale insulin lispro pens.  Glucometer and lancets and alcohol swabs also prescribed.  Patient follow-up primary care physician for ongoing care.  Hemoglobin A1c was 13    Discharge Exam:  Patient seen and examined by me on discharge day.  Pertinent Findings:  Patient Vitals for the past 24 hrs:   BP Temp Temp src Pulse Resp SpO2 Weight   03/13/24 0822 138/88 98.3 °F (36.8 °C) Oral 80 23 99 % --   03/13/24 0126 -- -- -- -- -- -- 132.5 kg (292 lb 1.8 oz)   03/12/24 2345 (!) 134/92

## 2024-03-13 NOTE — DISCHARGE INSTR - COC
to Rehab): {Prognosis:5928754416}    Recommended Labs or Other Treatments After Discharge: ***    Physician Certification: I certify the above information and transfer of Humza Briscoe  is necessary for the continuing treatment of the diagnosis listed and that he requires {Admit to Appropriate Level of Care:73912} for {GREATER/LESS:323377630} 30 days.     Update Admission H&P: {CHP DME Changes in HandP:925005084}    PHYSICIAN SIGNATURE:  {Esignature:[id number]}

## 2024-03-20 ENCOUNTER — HOSPITAL ENCOUNTER (EMERGENCY)
Facility: HOSPITAL | Age: 39
Discharge: HOME | End: 2024-03-20
Attending: EMERGENCY MEDICINE
Payer: COMMERCIAL

## 2024-03-20 VITALS
WEIGHT: 296 LBS | DIASTOLIC BLOOD PRESSURE: 93 MMHG | HEART RATE: 56 BPM | SYSTOLIC BLOOD PRESSURE: 132 MMHG | TEMPERATURE: 98.2 F | HEIGHT: 71 IN | OXYGEN SATURATION: 96 % | BODY MASS INDEX: 41.44 KG/M2 | RESPIRATION RATE: 20 BRPM

## 2024-03-20 DIAGNOSIS — J02.9 SORE THROAT: Primary | ICD-10-CM

## 2024-03-20 PROCEDURE — 87147 CULTURE TYPE IMMUNOLOGIC: CPT

## 2024-03-20 PROCEDURE — 87636 SARSCOV2 & INF A&B AMP PRB: CPT

## 2024-03-20 PROCEDURE — 99283 EMERGENCY DEPT VISIT LOW MDM: CPT

## 2024-03-20 PROCEDURE — 6370000000 HC RX 637 (ALT 250 FOR IP): Performed by: EMERGENCY MEDICINE

## 2024-03-20 PROCEDURE — 87070 CULTURE OTHR SPECIMN AEROBIC: CPT

## 2024-03-20 PROCEDURE — 87880 STREP A ASSAY W/OPTIC: CPT

## 2024-03-20 RX ORDER — HYDROCODONE BITARTRATE AND ACETAMINOPHEN 5; 325 MG/1; MG/1
1 TABLET ORAL EVERY 6 HOURS PRN
Qty: 12 TABLET | Refills: 0 | Status: SHIPPED | OUTPATIENT
Start: 2024-03-20 | End: 2024-03-23

## 2024-03-20 RX ORDER — HYDROCODONE BITARTRATE AND ACETAMINOPHEN 5; 325 MG/1; MG/1
1 TABLET ORAL
Status: COMPLETED | OUTPATIENT
Start: 2024-03-20 | End: 2024-03-20

## 2024-03-20 RX ORDER — AZITHROMYCIN 250 MG/1
250 TABLET, FILM COATED ORAL DAILY
Qty: 4 TABLET | Refills: 0 | Status: SHIPPED | OUTPATIENT
Start: 2024-03-20 | End: 2024-03-24

## 2024-03-20 RX ORDER — AZITHROMYCIN 250 MG/1
500 TABLET, FILM COATED ORAL
Status: COMPLETED | OUTPATIENT
Start: 2024-03-20 | End: 2024-03-20

## 2024-03-20 RX ADMIN — HYDROCODONE BITARTRATE AND ACETAMINOPHEN 1 TABLET: 5; 325 TABLET ORAL at 05:21

## 2024-03-20 RX ADMIN — AZITHROMYCIN 500 MG: 250 TABLET, FILM COATED ORAL at 05:20

## 2024-03-20 ASSESSMENT — ENCOUNTER SYMPTOMS
RHINORRHEA: 0
GASTROINTESTINAL NEGATIVE: 1
EYES NEGATIVE: 1
RESPIRATORY NEGATIVE: 1
SORE THROAT: 1

## 2024-03-20 NOTE — ED NOTES
[facility] EMERGENCY DEPT  EMERGENCY DEPARTMENT ENCOUNTER      Pt Name: Humza Briscoe  MRN: [id number]  Birthdate 1985  Date of evaluation: 3/20/2024  Provider: Sukhwinder Woods MD  4:19 AM    CHIEF COMPLAINT     No chief complaint on file.        HISTORY OF PRESENT ILLNESS    Humza Briscoe is a 38 y.o. male who presents to the emergency department with complaint of sore throat and headache that started yesterday.  Pain is severe.  He denies nasal congestion or fever.        Nursing Notes were reviewed.    REVIEW OF SYSTEMS       Review of Systems   Constitutional:  Negative for chills and fever.   HENT:  Positive for sore throat. Negative for congestion and rhinorrhea.    Eyes: Negative.    Respiratory: Negative.     Cardiovascular: Negative.    Gastrointestinal: Negative.    Endocrine: Negative.    Musculoskeletal: Negative.    Neurological:  Positive for headaches.   Hematological: Negative.        Except as noted above the remainder of the review of systems was reviewed and negative.       PAST MEDICAL HISTORY     Past Medical History:   Diagnosis Date    Diabetes mellitus (HCC)     HTN (hypertension)     Kidney stone          SURGICAL HISTORY       Past Surgical History:   Procedure Laterality Date    LITHOTRIPSY  01/20/2021    Extracorporeal shockwave lithotripsy    ORTHOPEDIC SURGERY Right     Thumb reattachment    SHOULDER ARTHROSCOPY           CURRENT MEDICATIONS       Previous Medications    AMLODIPINE (NORVASC) 10 MG TABLET    Take 1 tablet by mouth nightly    BLOOD GLUCOSE MONITOR STRIPS    Test 3 times a day prior to meals and at bedtime & as needed for symptoms of irregular blood glucose. Dispense sufficient amount for indicated testing frequency plus additional to accommodate PRN testing needs.    GLUCOSE MONITORING KIT    1 kit by Does not apply route daily    INSULIN GLARGINE (LANTUS SOLOSTAR) 100 UNIT/ML INJECTION PEN    Inject 40 Units into the skin nightly    INSULIN LISPRO, 1 UNIT DIAL, (HUMALOG

## 2024-03-20 NOTE — ED NOTES
Discharge instructions reviewed and pt verbalized understanding. Pt stable and ambulatory at time of discharge.

## 2024-03-20 NOTE — ED TRIAGE NOTES
Pt states that he woke up with sore throat and headache this morning. Pt says pain is severe enough that he is unable to swallow.  
Immediate family member

## 2024-03-22 LAB
BACTERIA SPEC CULT: ABNORMAL
BACTERIA SPEC CULT: ABNORMAL
Lab: ABNORMAL

## 2024-12-12 ENCOUNTER — HOSPITAL ENCOUNTER (EMERGENCY)
Facility: HOSPITAL | Age: 39
Discharge: HOME | End: 2024-12-12
Attending: EMERGENCY MEDICINE
Payer: COMMERCIAL

## 2024-12-12 ENCOUNTER — APPOINTMENT (OUTPATIENT)
Facility: HOSPITAL | Age: 39
End: 2024-12-12
Attending: EMERGENCY MEDICINE
Payer: COMMERCIAL

## 2024-12-12 VITALS
HEART RATE: 83 BPM | BODY MASS INDEX: 43.54 KG/M2 | DIASTOLIC BLOOD PRESSURE: 101 MMHG | SYSTOLIC BLOOD PRESSURE: 164 MMHG | WEIGHT: 311 LBS | TEMPERATURE: 98 F | RESPIRATION RATE: 20 BRPM | HEIGHT: 71 IN | OXYGEN SATURATION: 99 %

## 2024-12-12 DIAGNOSIS — S93.401A SPRAIN OF RIGHT ANKLE, UNSPECIFIED LIGAMENT, INITIAL ENCOUNTER: Primary | ICD-10-CM

## 2024-12-12 PROCEDURE — 73610 X-RAY EXAM OF ANKLE: CPT

## 2024-12-12 PROCEDURE — 6370000000 HC RX 637 (ALT 250 FOR IP): Performed by: EMERGENCY MEDICINE

## 2024-12-12 PROCEDURE — 99283 EMERGENCY DEPT VISIT LOW MDM: CPT

## 2024-12-12 RX ORDER — HYDROCODONE BITARTRATE AND ACETAMINOPHEN 5; 325 MG/1; MG/1
1 TABLET ORAL
Status: COMPLETED | OUTPATIENT
Start: 2024-12-12 | End: 2024-12-12

## 2024-12-12 RX ORDER — IBUPROFEN 800 MG/1
800 TABLET, FILM COATED ORAL EVERY 8 HOURS PRN
Qty: 30 TABLET | Refills: 0 | Status: SHIPPED | OUTPATIENT
Start: 2024-12-12 | End: 2024-12-22

## 2024-12-12 RX ADMIN — HYDROCODONE BITARTRATE AND ACETAMINOPHEN 1 TABLET: 5; 325 TABLET ORAL at 10:44

## 2024-12-12 ASSESSMENT — PAIN - FUNCTIONAL ASSESSMENT: PAIN_FUNCTIONAL_ASSESSMENT: 0-10

## 2024-12-12 ASSESSMENT — LIFESTYLE VARIABLES: HOW OFTEN DO YOU HAVE A DRINK CONTAINING ALCOHOL: PATIENT DECLINED

## 2024-12-12 ASSESSMENT — PAIN SCALES - GENERAL: PAINLEVEL_OUTOF10: 10

## 2024-12-12 ASSESSMENT — PAIN DESCRIPTION - LOCATION: LOCATION: ANKLE

## 2024-12-12 ASSESSMENT — PAIN DESCRIPTION - ORIENTATION: ORIENTATION: RIGHT

## 2024-12-12 ASSESSMENT — PAIN DESCRIPTION - PAIN TYPE: TYPE: ACUTE PAIN

## 2024-12-12 NOTE — ED PROVIDER NOTES
[facility] EMERGENCY DEPT  EMERGENCY DEPARTMENT HISTORY AND PHYSICAL EXAM      Date: 12/12/2024  Patient Name: Humza Briscoe  MRN: [id number]  YOB: 1985  Date of evaluation: 12/12/2024  Provider: Cat Goldman MD   Note Started: 10:41 AM EST 12/12/24    HISTORY OF PRESENT ILLNESS     Chief Complaint   Patient presents with    Ankle Pain       History Provided By: Patient    HPI: Humza Briscoe is a 39 y.o. male     PAST MEDICAL HISTORY   Past Medical History:  Past Medical History:   Diagnosis Date    Diabetes mellitus (HCC)     HTN (hypertension)     Kidney stone        Past Surgical History:  Past Surgical History:   Procedure Laterality Date    LITHOTRIPSY  01/20/2021    Extracorporeal shockwave lithotripsy    ORTHOPEDIC SURGERY Right     Thumb reattachment    SHOULDER ARTHROSCOPY         Family History:  Family History   Problem Relation Age of Onset    Hypertension Mother     No Known Problems Father     Stroke Maternal Grandmother     Diabetes Maternal Grandmother     Hypertension Maternal Grandmother     Cancer Paternal Grandmother        Social History:  Social History     Tobacco Use    Smoking status: Former     Types: Cigars     Passive exposure: Never    Smokeless tobacco: Never   Vaping Use    Vaping status: Never Used   Substance Use Topics    Alcohol use: Yes     Alcohol/week: 2.0 standard drinks of alcohol    Drug use: Not Currently     Types: Marijuana (Weed)       Allergies:  No Known Allergies    PCP: Lidia Lawson APRN - NP    Current Meds:   Current Facility-Administered Medications   Medication Dose Route Frequency Provider Last Rate Last Admin    HYDROcodone-acetaminophen (NORCO) 5-325 MG per tablet 1 tablet  1 tablet Oral NOW Cat Goldman MD         Current Outpatient Medications   Medication Sig Dispense Refill    insulin glargine (LANTUS SOLOSTAR) 100 UNIT/ML injection pen Inject 40 Units into the skin nightly 30 Adjustable Dose Pre-filled Pen Syringe 3    insulin

## 2024-12-12 NOTE — ED NOTES
Pt given discharge and follow up instructions. Education on pain management and prescriptions given. Pt ambulatory with crutches. No further questions at this time.

## 2025-09-03 ENCOUNTER — HOSPITAL ENCOUNTER (EMERGENCY)
Facility: HOSPITAL | Age: 40
Discharge: LEFT BEFORE DISCHARGE | End: 2025-09-03
Attending: EMERGENCY MEDICINE
Payer: COMMERCIAL

## 2025-09-03 VITALS
HEART RATE: 99 BPM | TEMPERATURE: 98.4 F | OXYGEN SATURATION: 99 % | RESPIRATION RATE: 18 BRPM | SYSTOLIC BLOOD PRESSURE: 159 MMHG | WEIGHT: 315 LBS | DIASTOLIC BLOOD PRESSURE: 83 MMHG | HEIGHT: 71 IN | BODY MASS INDEX: 44.1 KG/M2
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
## 2025-09-03 PROCEDURE — 90471 IMMUNIZATION ADMIN: CPT | Performed by: EMERGENCY MEDICINE

## 2025-09-03 PROCEDURE — 90714 TD VACC NO PRESV 7 YRS+ IM: CPT | Performed by: EMERGENCY MEDICINE

## 2025-09-03 PROCEDURE — 99284 EMERGENCY DEPT VISIT MOD MDM: CPT

## 2025-09-03 PROCEDURE — 6370000000 HC RX 637 (ALT 250 FOR IP): Performed by: EMERGENCY MEDICINE

## 2025-09-03 PROCEDURE — 6360000002 HC RX W HCPCS: Performed by: EMERGENCY MEDICINE

## 2025-09-03 RX ORDER — ACETAMINOPHEN 500 MG
1000 TABLET ORAL
Status: COMPLETED | OUTPATIENT
Start: 2025-09-03 | End: 2025-09-03

## 2025-09-03 RX ADMIN — CLOSTRIDIUM TETANI TOXOID ANTIGEN (FORMALDEHYDE INACTIVATED) AND CORYNEBACTERIUM DIPHTHERIAE TOXOID ANTIGEN (FORMALDEHYDE INACTIVATED) 0.5 ML: 5; 2 INJECTION, SUSPENSION INTRAMUSCULAR at 17:22

## 2025-09-03 RX ADMIN — ACETAMINOPHEN 1000 MG: 500 TABLET ORAL at 17:22

## 2025-09-03 ASSESSMENT — LIFESTYLE VARIABLES
HOW OFTEN DO YOU HAVE A DRINK CONTAINING ALCOHOL: NEVER
HOW MANY STANDARD DRINKS CONTAINING ALCOHOL DO YOU HAVE ON A TYPICAL DAY: PATIENT DOES NOT DRINK

## (undated) DEVICE — STRAP,POSITIONING,KNEE/BODY,FOAM,4X60": Brand: MEDLINE

## (undated) DEVICE — TURNOVER KIT OR CUST CMB FLD GEN LF

## (undated) DEVICE — BASIC SINGLE BASIN-LF: Brand: MEDLINE INDUSTRIES, INC.